# Patient Record
Sex: MALE | Race: WHITE | URBAN - METROPOLITAN AREA
[De-identification: names, ages, dates, MRNs, and addresses within clinical notes are randomized per-mention and may not be internally consistent; named-entity substitution may affect disease eponyms.]

---

## 2018-01-01 ENCOUNTER — APPOINTMENT (OUTPATIENT)
Dept: OCCUPATIONAL THERAPY | Facility: CLINIC | Age: 63
DRG: 041 | End: 2018-01-01
Attending: PSYCHIATRY & NEUROLOGY
Payer: MEDICARE

## 2018-01-01 ENCOUNTER — HOSPITAL ENCOUNTER (OUTPATIENT)
Dept: MRI IMAGING | Facility: CLINIC | Age: 63
Discharge: HOME | End: 2018-11-23
Attending: PSYCHIATRY & NEUROLOGY | Admitting: PSYCHIATRY & NEUROLOGY
Payer: MEDICARE

## 2018-01-01 ENCOUNTER — APPOINTMENT (OUTPATIENT)
Dept: GENERAL RADIOLOGY | Facility: CLINIC | Age: 63
DRG: 041 | End: 2018-01-01
Attending: STUDENT IN AN ORGANIZED HEALTH CARE EDUCATION/TRAINING PROGRAM
Payer: MEDICARE

## 2018-01-01 ENCOUNTER — PRE VISIT (OUTPATIENT)
Dept: NEUROLOGY | Facility: CLINIC | Age: 63
End: 2018-01-01

## 2018-01-01 ENCOUNTER — APPOINTMENT (OUTPATIENT)
Dept: PHYSICAL THERAPY | Facility: CLINIC | Age: 63
DRG: 041 | End: 2018-01-01
Attending: PSYCHIATRY & NEUROLOGY
Payer: MEDICARE

## 2018-01-01 ENCOUNTER — OFFICE VISIT (OUTPATIENT)
Dept: NEUROLOGY | Facility: CLINIC | Age: 63
End: 2018-01-01
Payer: MEDICARE

## 2018-01-01 ENCOUNTER — DOCUMENTATION ONLY (OUTPATIENT)
Dept: NEUROLOGY | Facility: CLINIC | Age: 63
End: 2018-01-01

## 2018-01-01 ENCOUNTER — HOSPITAL ENCOUNTER (OUTPATIENT)
Dept: MRI IMAGING | Facility: CLINIC | Age: 63
End: 2018-11-16
Attending: PSYCHIATRY & NEUROLOGY
Payer: MEDICARE

## 2018-01-01 ENCOUNTER — TELEPHONE (OUTPATIENT)
Dept: NEUROLOGY | Facility: CLINIC | Age: 63
End: 2018-01-01

## 2018-01-01 ENCOUNTER — APPOINTMENT (OUTPATIENT)
Dept: LAB | Facility: CLINIC | Age: 63
End: 2018-01-01
Payer: MEDICARE

## 2018-01-01 ENCOUNTER — ANESTHESIA EVENT (OUTPATIENT)
Dept: SURGERY | Facility: CLINIC | Age: 63
DRG: 041 | End: 2018-01-01
Payer: MEDICARE

## 2018-01-01 ENCOUNTER — HOSPITAL ENCOUNTER (OUTPATIENT)
Dept: MRI IMAGING | Facility: CLINIC | Age: 63
Discharge: HOME | End: 2018-11-16
Attending: PSYCHIATRY & NEUROLOGY | Admitting: PSYCHIATRY & NEUROLOGY
Payer: MEDICARE

## 2018-01-01 ENCOUNTER — ANESTHESIA (OUTPATIENT)
Dept: SURGERY | Facility: CLINIC | Age: 63
DRG: 041 | End: 2018-01-01
Payer: MEDICARE

## 2018-01-01 ENCOUNTER — RADIANT APPOINTMENT (OUTPATIENT)
Dept: GENERAL RADIOLOGY | Facility: CLINIC | Age: 63
End: 2018-01-01
Attending: PSYCHIATRY & NEUROLOGY
Payer: MEDICARE

## 2018-01-01 ENCOUNTER — HOSPITAL ENCOUNTER (OUTPATIENT)
Dept: MRI IMAGING | Facility: CLINIC | Age: 63
End: 2018-11-23
Attending: PSYCHIATRY & NEUROLOGY
Payer: MEDICARE

## 2018-01-01 ENCOUNTER — APPOINTMENT (OUTPATIENT)
Dept: OCCUPATIONAL THERAPY | Facility: CLINIC | Age: 63
DRG: 041 | End: 2018-01-01
Attending: STUDENT IN AN ORGANIZED HEALTH CARE EDUCATION/TRAINING PROGRAM
Payer: MEDICARE

## 2018-01-01 ENCOUNTER — OFFICE VISIT (OUTPATIENT)
Dept: NEUROLOGY | Facility: CLINIC | Age: 63
End: 2018-01-01

## 2018-01-01 ENCOUNTER — HOSPITAL ENCOUNTER (INPATIENT)
Facility: CLINIC | Age: 63
LOS: 8 days | Discharge: NURSING, REHAB OR LONG-TERM CARE | DRG: 041 | End: 2018-12-14
Attending: PSYCHIATRY & NEUROLOGY | Admitting: PSYCHIATRY & NEUROLOGY
Payer: MEDICARE

## 2018-01-01 ENCOUNTER — HOSPITAL ENCOUNTER (INPATIENT)
Facility: CLINIC | Age: 63
LOS: 12 days | Discharge: NURSING, REHAB OR LONG-TERM CARE | DRG: 056 | End: 2018-12-26
Attending: PHYSICAL MEDICINE & REHABILITATION | Admitting: PHYSICAL MEDICINE & REHABILITATION
Payer: MEDICARE

## 2018-01-01 ENCOUNTER — TELEPHONE (OUTPATIENT)
Dept: ONCOLOGY | Facility: CLINIC | Age: 63
End: 2018-01-01

## 2018-01-01 ENCOUNTER — APPOINTMENT (OUTPATIENT)
Dept: PHYSICAL THERAPY | Facility: CLINIC | Age: 63
DRG: 041 | End: 2018-01-01
Attending: STUDENT IN AN ORGANIZED HEALTH CARE EDUCATION/TRAINING PROGRAM
Payer: MEDICARE

## 2018-01-01 VITALS
RESPIRATION RATE: 16 BRPM | SYSTOLIC BLOOD PRESSURE: 149 MMHG | HEART RATE: 54 BPM | OXYGEN SATURATION: 95 % | DIASTOLIC BLOOD PRESSURE: 99 MMHG | WEIGHT: 315 LBS | BODY MASS INDEX: 45.8 KG/M2 | TEMPERATURE: 96.5 F

## 2018-01-01 VITALS
BODY MASS INDEX: 45.1 KG/M2 | DIASTOLIC BLOOD PRESSURE: 96 MMHG | TEMPERATURE: 97.7 F | SYSTOLIC BLOOD PRESSURE: 150 MMHG | HEART RATE: 64 BPM | OXYGEN SATURATION: 94 % | RESPIRATION RATE: 18 BRPM | HEIGHT: 70 IN | WEIGHT: 315 LBS

## 2018-01-01 VITALS
BODY MASS INDEX: 43.95 KG/M2 | TEMPERATURE: 97.8 F | HEART RATE: 54 BPM | RESPIRATION RATE: 18 BRPM | SYSTOLIC BLOOD PRESSURE: 107 MMHG | WEIGHT: 307 LBS | OXYGEN SATURATION: 93 % | DIASTOLIC BLOOD PRESSURE: 73 MMHG | HEIGHT: 70 IN

## 2018-01-01 VITALS
BODY MASS INDEX: 44.34 KG/M2 | WEIGHT: 309.7 LBS | TEMPERATURE: 96.2 F | HEART RATE: 62 BPM | RESPIRATION RATE: 16 BRPM | SYSTOLIC BLOOD PRESSURE: 138 MMHG | DIASTOLIC BLOOD PRESSURE: 93 MMHG | OXYGEN SATURATION: 96 % | HEIGHT: 70 IN

## 2018-01-01 VITALS
WEIGHT: 315 LBS | DIASTOLIC BLOOD PRESSURE: 99 MMHG | HEART RATE: 66 BPM | SYSTOLIC BLOOD PRESSURE: 156 MMHG | OXYGEN SATURATION: 95 % | BODY MASS INDEX: 46.06 KG/M2

## 2018-01-01 VITALS
RESPIRATION RATE: 18 BRPM | HEART RATE: 64 BPM | OXYGEN SATURATION: 97 % | DIASTOLIC BLOOD PRESSURE: 82 MMHG | TEMPERATURE: 98.1 F | SYSTOLIC BLOOD PRESSURE: 151 MMHG

## 2018-01-01 DIAGNOSIS — R29.898 RIGHT LEG WEAKNESS: Primary | ICD-10-CM

## 2018-01-01 DIAGNOSIS — I10 ESSENTIAL HYPERTENSION: ICD-10-CM

## 2018-01-01 DIAGNOSIS — G61.81 CIDP (CHRONIC INFLAMMATORY DEMYELINATING POLYNEUROPATHY) (H): Primary | ICD-10-CM

## 2018-01-01 DIAGNOSIS — G62.9 NEUROPATHY: ICD-10-CM

## 2018-01-01 DIAGNOSIS — G61.81 CIDP (CHRONIC INFLAMMATORY DEMYELINATING POLYNEUROPATHY) (H): ICD-10-CM

## 2018-01-01 DIAGNOSIS — G56.01 CARPAL TUNNEL SYNDROME OF RIGHT WRIST: ICD-10-CM

## 2018-01-01 DIAGNOSIS — D47.2 MONOCLONAL GAMMOPATHY: Primary | ICD-10-CM

## 2018-01-01 DIAGNOSIS — M62.81 GENERALIZED MUSCLE WEAKNESS: ICD-10-CM

## 2018-01-01 DIAGNOSIS — R06.09 DOE (DYSPNEA ON EXERTION): Primary | ICD-10-CM

## 2018-01-01 DIAGNOSIS — R53.1 RAPIDLY PROGRESSIVE WEAKNESS: Primary | ICD-10-CM

## 2018-01-01 DIAGNOSIS — M62.81 GENERALIZED MUSCLE WEAKNESS: Primary | ICD-10-CM

## 2018-01-01 DIAGNOSIS — G12.20 MND (MOTOR NEURONE DISEASE) (H): Primary | ICD-10-CM

## 2018-01-01 DIAGNOSIS — G62.9 NEUROPATHY: Primary | ICD-10-CM

## 2018-01-01 DIAGNOSIS — H25.13 NUCLEAR SENILE CATARACT OF BOTH EYES: ICD-10-CM

## 2018-01-01 LAB
ALB CSF/SERPL: 15.7 RATIO (ref 0–9)
ALB CSF/SERPL: 32 RATIO (ref 0–9)
ALBUMIN CSF-MCNC: 63 MG/DL (ref 0–35)
ALBUMIN CSF-MCNC: 99 MG/DL (ref 0–35)
ALBUMIN SERPL ELPH-MCNC: 4.4 G/DL (ref 3.7–5.1)
ALBUMIN SERPL-MCNC: 3090 MG/DL (ref 3500–5200)
ALBUMIN SERPL-MCNC: 4020 MG/DL (ref 3500–5200)
ALPHA1 GLOB SERPL ELPH-MCNC: 0.3 G/DL (ref 0.2–0.4)
ALPHA2 GLOB SERPL ELPH-MCNC: 0.8 G/DL (ref 0.5–0.9)
ANA PAT SER IF-IMP: ABNORMAL
ANA SER QL IF: ABNORMAL
ANA TITR SER IF: ABNORMAL {TITER}
ANION GAP SERPL CALCULATED.3IONS-SCNC: 13 MMOL/L (ref 3–14)
ANION GAP SERPL CALCULATED.3IONS-SCNC: 4 MMOL/L (ref 3–14)
ANION GAP SERPL CALCULATED.3IONS-SCNC: 6 MMOL/L (ref 3–14)
ANION GAP SERPL CALCULATED.3IONS-SCNC: 6 MMOL/L (ref 3–14)
ANION GAP SERPL CALCULATED.3IONS-SCNC: 7 MMOL/L (ref 3–14)
ANION GAP SERPL CALCULATED.3IONS-SCNC: 7 MMOL/L (ref 3–14)
ANION GAP SERPL CALCULATED.3IONS-SCNC: 8 MMOL/L (ref 3–14)
ANION GAP SERPL CALCULATED.3IONS-SCNC: 9 MMOL/L (ref 3–14)
APPEARANCE CSF: ABNORMAL
APTT PPP: 33 SEC (ref 22–37)
B BURGDOR AB CSF IA-ACNC: 0.25 LIV
B BURGDOR AB CSF IA-ACNC: NORMAL
B BURGDOR IGG+IGM SER QL: 0.11 (ref 0–0.89)
B-GLOBULIN SERPL ELPH-MCNC: 1 G/DL (ref 0.6–1)
BACTERIA SPEC CULT: NO GROWTH
BASOPHILS # BLD AUTO: 0 10E9/L (ref 0–0.2)
BASOPHILS NFR BLD AUTO: 0.3 %
BUN SERPL-MCNC: 10 MG/DL (ref 7–30)
BUN SERPL-MCNC: 11 MG/DL (ref 7–30)
BUN SERPL-MCNC: 12 MG/DL (ref 7–30)
BUN SERPL-MCNC: 13 MG/DL (ref 7–30)
BUN SERPL-MCNC: 13 MG/DL (ref 7–30)
BUN SERPL-MCNC: 14 MG/DL (ref 7–30)
BUN SERPL-MCNC: 16 MG/DL (ref 7–30)
BUN SERPL-MCNC: 16 MG/DL (ref 7–30)
CALCIUM SERPL-MCNC: 8.8 MG/DL (ref 8.5–10.1)
CALCIUM SERPL-MCNC: 9.1 MG/DL (ref 8.5–10.1)
CALCIUM SERPL-MCNC: 9.2 MG/DL (ref 8.5–10.1)
CALCIUM SERPL-MCNC: 9.2 MG/DL (ref 8.5–10.1)
CALCIUM SERPL-MCNC: 9.5 MG/DL (ref 8.5–10.1)
CHLORIDE SERPL-SCNC: 102 MMOL/L (ref 94–109)
CHLORIDE SERPL-SCNC: 103 MMOL/L (ref 94–109)
CK SERPL-CCNC: 630 U/L (ref 30–300)
CO2 SERPL-SCNC: 23 MMOL/L (ref 20–32)
CO2 SERPL-SCNC: 27 MMOL/L (ref 20–32)
CO2 SERPL-SCNC: 28 MMOL/L (ref 20–32)
CO2 SERPL-SCNC: 29 MMOL/L (ref 20–32)
COLOR CSF: ABNORMAL
COPATH REPORT: NORMAL
CREAT SERPL-MCNC: 0.52 MG/DL (ref 0.66–1.25)
CREAT SERPL-MCNC: 0.55 MG/DL (ref 0.66–1.25)
CREAT SERPL-MCNC: 0.6 MG/DL (ref 0.66–1.25)
CREAT SERPL-MCNC: 0.61 MG/DL (ref 0.66–1.25)
CREAT SERPL-MCNC: 0.62 MG/DL (ref 0.66–1.25)
CREAT SERPL-MCNC: 0.63 MG/DL (ref 0.66–1.25)
CREAT SERPL-MCNC: 0.64 MG/DL (ref 0.66–1.25)
CREAT SERPL-MCNC: 0.65 MG/DL (ref 0.66–1.25)
CREAT SERPL-MCNC: 0.66 MG/DL (ref 0.66–1.25)
CRP SERPL-MCNC: 4.7 MG/L (ref 0–8)
DIFFERENTIAL METHOD BLD: NORMAL
DLCOUNC-%PRED-PRE: 112 %
DLCOUNC-PRE: 30.43 ML/MIN/MMHG
DLCOUNC-PRED: 27.08 ML/MIN/MMHG
ENA SS-A IGG SER IA-ACNC: <0.2 AI (ref 0–0.9)
ENA SS-B IGG SER IA-ACNC: <0.2 AI (ref 0–0.9)
EOSINOPHIL # BLD AUTO: 0.1 10E9/L (ref 0–0.7)
EOSINOPHIL NFR BLD AUTO: 0.7 %
EOSINOPHIL NFR CSF MANUAL: 1 %
ERV-%PRED-PRE: 266 %
ERV-PRE: 0.99 L
ERV-PRED: 0.37 L
ERYTHROCYTE [DISTWIDTH] IN BLOOD BY AUTOMATED COUNT: 13.3 % (ref 10–15)
ERYTHROCYTE [DISTWIDTH] IN BLOOD BY AUTOMATED COUNT: 13.3 % (ref 10–15)
ERYTHROCYTE [DISTWIDTH] IN BLOOD BY AUTOMATED COUNT: 13.4 % (ref 10–15)
ERYTHROCYTE [DISTWIDTH] IN BLOOD BY AUTOMATED COUNT: 13.5 % (ref 10–15)
ERYTHROCYTE [DISTWIDTH] IN BLOOD BY AUTOMATED COUNT: 13.6 % (ref 10–15)
ERYTHROCYTE [DISTWIDTH] IN BLOOD BY AUTOMATED COUNT: 13.7 % (ref 10–15)
ERYTHROCYTE [SEDIMENTATION RATE] IN BLOOD BY WESTERGREN METHOD: 17 MM/H (ref 0–20)
ERYTHROCYTE [SEDIMENTATION RATE] IN BLOOD BY WESTERGREN METHOD: 9 MM/H (ref 0–20)
EXPTIME-PRE: 7.48 SEC
FEF2575-%PRED-PRE: 67 %
FEF2575-PRE: 1.8 L/SEC
FEF2575-PRED: 2.66 L/SEC
FEFMAX-%PRED-PRE: 58 %
FEFMAX-PRE: 5.1 L/SEC
FEFMAX-PRED: 8.75 L/SEC
FEV1-%PRED-PRE: 66 %
FEV1-PRE: 2.25 L
FEV1FEV6-PRE: 77 %
FEV1FEV6-PRED: 78 %
FEV1FVC-PRE: 76 %
FEV1FVC-PRED: 74 %
FEV1SVC-PRE: 78 %
FEV1SVC-PRED: 69 %
FIFMAX-PRE: 4.27 L/SEC
FRCPLETH-%PRED-PRE: 85 %
FRCPLETH-PRE: 3.11 L
FRCPLETH-PRED: 3.66 L
FVC-%PRED-PRE: 67 %
FVC-PRE: 2.97 L
FVC-PRED: 4.41 L
GAMMA GLOB SERPL ELPH-MCNC: 1.5 G/DL (ref 0.7–1.6)
GFR SERPL CREATININE-BSD FRML MDRD: >90 ML/MIN/1.7M2
GLUCOSE CSF-MCNC: 65 MG/DL (ref 40–70)
GLUCOSE CSF-MCNC: 74 MG/DL (ref 40–70)
GLUCOSE SERPL-MCNC: 108 MG/DL (ref 70–99)
GLUCOSE SERPL-MCNC: 109 MG/DL (ref 70–99)
GLUCOSE SERPL-MCNC: 114 MG/DL (ref 70–99)
GLUCOSE SERPL-MCNC: 115 MG/DL (ref 70–99)
GLUCOSE SERPL-MCNC: 125 MG/DL (ref 70–99)
GLUCOSE SERPL-MCNC: 133 MG/DL (ref 70–99)
GLUCOSE SERPL-MCNC: 134 MG/DL (ref 70–99)
GLUCOSE SERPL-MCNC: 162 MG/DL (ref 70–99)
GM1 GANGL IGG SER IA-ACNC: 8 IV (ref 0–50)
GM1 GANGL IGM SER IA-ACNC: 1 IV (ref 0–50)
GRAM STN SPEC: NORMAL
HBA1C MFR BLD: 6.7 % (ref 0–5.6)
HCT VFR BLD AUTO: 44.3 % (ref 40–53)
HCT VFR BLD AUTO: 44.3 % (ref 40–53)
HCT VFR BLD AUTO: 44.7 % (ref 40–53)
HCT VFR BLD AUTO: 45.2 % (ref 40–53)
HCT VFR BLD AUTO: 46 % (ref 40–53)
HCT VFR BLD AUTO: 46.4 % (ref 40–53)
HCT VFR BLD AUTO: 46.5 % (ref 40–53)
HCT VFR BLD AUTO: 46.9 % (ref 40–53)
HCT VFR BLD AUTO: 47.7 % (ref 40–53)
HGB BLD-MCNC: 14.6 G/DL (ref 13.3–17.7)
HGB BLD-MCNC: 14.6 G/DL (ref 13.3–17.7)
HGB BLD-MCNC: 14.8 G/DL (ref 13.3–17.7)
HGB BLD-MCNC: 15.1 G/DL (ref 13.3–17.7)
HGB BLD-MCNC: 15.4 G/DL (ref 13.3–17.7)
HGB BLD-MCNC: 15.4 G/DL (ref 13.3–17.7)
HGB BLD-MCNC: 15.6 G/DL (ref 13.3–17.7)
HGB BLD-MCNC: 15.7 G/DL (ref 13.3–17.7)
HGB BLD-MCNC: 15.8 G/DL (ref 13.3–17.7)
IC-%PRED-PRE: 42 %
IC-PRE: 1.89 L
IC-PRED: 4.49 L
IGA SERPL-MCNC: 396 MG/DL (ref 70–380)
IGA SERPL-MCNC: 449 MG/DL (ref 70–380)
IGG CSF-MCNC: 16.6 MG/DL (ref 0–6)
IGG CSF-MCNC: 17.1 MG/DL (ref 0–6)
IGG SERPL-MCNC: 1120 MG/DL (ref 768–1632)
IGG SERPL-MCNC: 1360 MG/DL (ref 768–1632)
IGG SERPL-MCNC: 1530 MG/DL (ref 695–1620)
IGG SERPL-MCNC: 2170 MG/DL (ref 695–1620)
IGG SYNTH RATE SER+CSF CALC-MRATE: 3.9 MG/D
IGG SYNTH RATE SER+CSF CALC-MRATE: 31.5 MG/D
IGG/ALB CLEAR SER+CSF-RTO: 0.48 RATIO (ref 0.28–0.66)
IGG/ALB CLEAR SER+CSF-RTO: 0.78 RATIO (ref 0.28–0.66)
IGG/ALB CSF: 0.17 RATIO (ref 0.09–0.25)
IGG/ALB CSF: 0.26 RATIO (ref 0.09–0.25)
IGM SERPL-MCNC: 88 MG/DL (ref 60–265)
IGM SERPL-MCNC: 95 MG/DL (ref 60–265)
IMM GRANULOCYTES # BLD: 0 10E9/L (ref 0–0.4)
IMM GRANULOCYTES NFR BLD: 0.3 %
INR PPP: 1.07 (ref 0.86–1.14)
LAB SCANNED RESULT: NORMAL
LEAD BLDV-MCNC: <2 UG/DL (ref 0–4.9)
LYMPH ABN NFR CSF MANUAL: 10 %
LYMPH ABN NFR CSF MANUAL: 32 %
LYMPH ABN NFR CSF MANUAL: 47 %
LYMPHOCYTES # BLD AUTO: 1.5 10E9/L (ref 0.8–5.3)
LYMPHOCYTES NFR BLD AUTO: 21.4 %
M PROTEIN SERPL ELPH-MCNC: 0 G/DL
MCH RBC QN AUTO: 28.8 PG (ref 26.5–33)
MCH RBC QN AUTO: 28.8 PG (ref 26.5–33)
MCH RBC QN AUTO: 28.9 PG (ref 26.5–33)
MCH RBC QN AUTO: 28.9 PG (ref 26.5–33)
MCH RBC QN AUTO: 29 PG (ref 26.5–33)
MCH RBC QN AUTO: 29.1 PG (ref 26.5–33)
MCH RBC QN AUTO: 29.1 PG (ref 26.5–33)
MCH RBC QN AUTO: 29.2 PG (ref 26.5–33)
MCH RBC QN AUTO: 29.2 PG (ref 26.5–33)
MCHC RBC AUTO-ENTMCNC: 33 G/DL (ref 31.5–36.5)
MCHC RBC AUTO-ENTMCNC: 33 G/DL (ref 31.5–36.5)
MCHC RBC AUTO-ENTMCNC: 33.1 G/DL (ref 31.5–36.5)
MCHC RBC AUTO-ENTMCNC: 33.4 G/DL (ref 31.5–36.5)
MCHC RBC AUTO-ENTMCNC: 33.5 G/DL (ref 31.5–36.5)
MCHC RBC AUTO-ENTMCNC: 33.5 G/DL (ref 31.5–36.5)
MCHC RBC AUTO-ENTMCNC: 33.6 G/DL (ref 31.5–36.5)
MCV RBC AUTO: 86 FL (ref 78–100)
MCV RBC AUTO: 87 FL (ref 78–100)
MCV RBC AUTO: 88 FL (ref 78–100)
METHYLMALONATE SERPL-SCNC: 0.16 UMOL/L (ref 0–0.4)
MISCELLANEOUS TEST: NORMAL
MONOCYTES # BLD AUTO: 0.6 10E9/L (ref 0–1.3)
MONOCYTES NFR BLD AUTO: 8.9 %
MONOS+MACROS NFR CSF MANUAL: 3 %
MONOS+MACROS NFR CSF MANUAL: 47 %
MONOS+MACROS NFR CSF MANUAL: 5 %
NEUTROPHILS # BLD AUTO: 4.9 10E9/L (ref 1.6–8.3)
NEUTROPHILS NFR BLD AUTO: 68.4 %
NEUTROPHILS NFR CSF MANUAL: 43 %
NEUTROPHILS NFR CSF MANUAL: 48 %
NEUTROPHILS NFR CSF MANUAL: 64 %
NRBC # BLD AUTO: 0 10*3/UL
NRBC BLD AUTO-RTO: 0 /100
OLIGOCLONAL BANDS CSF ELPH-IMP: ABNORMAL
OLIGOCLONAL BANDS CSF ELPH-IMP: ABNORMAL
OLIGOCLONAL BANDS CSF ELPH-IMP: NEGATIVE
OLIGOCLONAL BANDS CSF ELPH-IMP: NEGATIVE
OLIGOCLONAL BANDS CSF IEF: 0 BANDS (ref 0–1)
OLIGOCLONAL BANDS CSF IEF: 0 BANDS (ref 0–1)
PLATELET # BLD AUTO: 184 10E9/L (ref 150–450)
PLATELET # BLD AUTO: 196 10E9/L (ref 150–450)
PLATELET # BLD AUTO: 201 10E9/L (ref 150–450)
PLATELET # BLD AUTO: 202 10E9/L (ref 150–450)
PLATELET # BLD AUTO: 210 10E9/L (ref 150–450)
PLATELET # BLD AUTO: 235 10E9/L (ref 150–450)
PLATELET # BLD AUTO: 248 10E9/L (ref 150–450)
PLATELET # BLD AUTO: 263 10E9/L (ref 150–450)
PLATELET # BLD AUTO: 264 10E9/L (ref 150–450)
PLATELET # BLD AUTO: 268 10E9/L (ref 150–450)
POTASSIUM SERPL-SCNC: 3.5 MMOL/L (ref 3.4–5.3)
POTASSIUM SERPL-SCNC: 3.6 MMOL/L (ref 3.4–5.3)
POTASSIUM SERPL-SCNC: 3.6 MMOL/L (ref 3.4–5.3)
POTASSIUM SERPL-SCNC: 3.7 MMOL/L (ref 3.4–5.3)
POTASSIUM SERPL-SCNC: 3.7 MMOL/L (ref 3.4–5.3)
POTASSIUM SERPL-SCNC: 3.8 MMOL/L (ref 3.4–5.3)
POTASSIUM SERPL-SCNC: 3.8 MMOL/L (ref 3.4–5.3)
POTASSIUM SERPL-SCNC: 3.9 MMOL/L (ref 3.4–5.3)
PROT CSF-MCNC: 136 MG/DL (ref 15–60)
PROT CSF-MCNC: 186 MG/DL (ref 15–60)
PROT PATTERN SERPL ELPH-IMP: NORMAL
PROT PATTERN SERPL IFE-IMP: ABNORMAL
PROT PATTERN SERPL IFE-IMP: ABNORMAL
RADIOLOGIST FLAGS: NORMAL
RBC # BLD AUTO: 5.06 10E12/L (ref 4.4–5.9)
RBC # BLD AUTO: 5.07 10E12/L (ref 4.4–5.9)
RBC # BLD AUTO: 5.09 10E12/L (ref 4.4–5.9)
RBC # BLD AUTO: 5.21 10E12/L (ref 4.4–5.9)
RBC # BLD AUTO: 5.27 10E12/L (ref 4.4–5.9)
RBC # BLD AUTO: 5.33 10E12/L (ref 4.4–5.9)
RBC # BLD AUTO: 5.34 10E12/L (ref 4.4–5.9)
RBC # BLD AUTO: 5.43 10E12/L (ref 4.4–5.9)
RBC # BLD AUTO: 5.46 10E12/L (ref 4.4–5.9)
RBC # CSF MANUAL: 4038 /UL (ref 0–2)
RBC # CSF MANUAL: 768 /UL (ref 0–2)
RBC # CSF MANUAL: 8525 /UL (ref 0–2)
RVPLETH-%PRED-PRE: 83 %
RVPLETH-PRE: 2.13 L
RVPLETH-PRED: 2.55 L
SODIUM SERPL-SCNC: 136 MMOL/L (ref 133–144)
SODIUM SERPL-SCNC: 138 MMOL/L (ref 133–144)
SODIUM SERPL-SCNC: 138 MMOL/L (ref 133–144)
SODIUM SERPL-SCNC: 139 MMOL/L (ref 133–144)
SPECIMEN SOURCE: NORMAL
SPECIMEN SOURCE: NORMAL
T4 FREE SERPL-MCNC: 1.09 NG/DL (ref 0.76–1.46)
TLCPLETH-%PRED-PRE: 70 %
TLCPLETH-PRE: 5.01 L
TLCPLETH-PRED: 7.13 L
TSH SERPL DL<=0.005 MIU/L-ACNC: 7.42 MU/L (ref 0.4–4)
TUBE # CSF: 1 #
TUBE # CSF: 3 #
TUBE # CSF: 4 #
VA-%PRED-PRE: 72 %
VA-PRE: 4.92 L
VC-%PRED-PRE: 59 %
VC-PRE: 2.88 L
VC-PRED: 4.86 L
VDRL CSF QL: NON REACTIVE
VDRL CSF QL: NORMAL
VIT B12 SERPL-MCNC: 462 PG/ML (ref 193–986)
WBC # BLD AUTO: 5.5 10E9/L (ref 4–11)
WBC # BLD AUTO: 5.5 10E9/L (ref 4–11)
WBC # BLD AUTO: 6.4 10E9/L (ref 4–11)
WBC # BLD AUTO: 6.5 10E9/L (ref 4–11)
WBC # BLD AUTO: 7 10E9/L (ref 4–11)
WBC # BLD AUTO: 7.1 10E9/L (ref 4–11)
WBC # BLD AUTO: 7.2 10E9/L (ref 4–11)
WBC # BLD AUTO: 7.3 10E9/L (ref 4–11)
WBC # BLD AUTO: 7.4 10E9/L (ref 4–11)
WBC # CSF MANUAL: 14 /UL (ref 0–5)
WBC # CSF MANUAL: 7 /UL (ref 0–5)
WBC # CSF MANUAL: 8 /UL (ref 0–5)

## 2018-01-01 PROCEDURE — 40000133 ZZH STATISTIC OT WARD VISIT: Performed by: STUDENT IN AN ORGANIZED HEALTH CARE EDUCATION/TRAINING PROGRAM

## 2018-01-01 PROCEDURE — 25000132 ZZH RX MED GY IP 250 OP 250 PS 637: Mod: GY | Performed by: STUDENT IN AN ORGANIZED HEALTH CARE EDUCATION/TRAINING PROGRAM

## 2018-01-01 PROCEDURE — 97530 THERAPEUTIC ACTIVITIES: CPT | Mod: GP | Performed by: PHYSICAL THERAPIST

## 2018-01-01 PROCEDURE — 97535 SELF CARE MNGMENT TRAINING: CPT | Mod: GO | Performed by: STUDENT IN AN ORGANIZED HEALTH CARE EDUCATION/TRAINING PROGRAM

## 2018-01-01 PROCEDURE — 82040 ASSAY OF SERUM ALBUMIN: CPT | Performed by: STUDENT IN AN ORGANIZED HEALTH CARE EDUCATION/TRAINING PROGRAM

## 2018-01-01 PROCEDURE — 009U3ZX DRAINAGE OF SPINAL CANAL, PERCUTANEOUS APPROACH, DIAGNOSTIC: ICD-10-PCS | Performed by: RADIOLOGY

## 2018-01-01 PROCEDURE — 25000132 ZZH RX MED GY IP 250 OP 250 PS 637: Mod: GY | Performed by: PHYSICAL MEDICINE & REHABILITATION

## 2018-01-01 PROCEDURE — 97535 SELF CARE MNGMENT TRAINING: CPT | Mod: GO

## 2018-01-01 PROCEDURE — 85730 THROMBOPLASTIN TIME PARTIAL: CPT | Performed by: STUDENT IN AN ORGANIZED HEALTH CARE EDUCATION/TRAINING PROGRAM

## 2018-01-01 PROCEDURE — 12000001 ZZH R&B MED SURG/OB UMMC

## 2018-01-01 PROCEDURE — 85027 COMPLETE CBC AUTOMATED: CPT | Performed by: PSYCHIATRY & NEUROLOGY

## 2018-01-01 PROCEDURE — A9585 GADOBUTROL INJECTION: HCPCS | Performed by: PSYCHIATRY & NEUROLOGY

## 2018-01-01 PROCEDURE — 97110 THERAPEUTIC EXERCISES: CPT | Mod: GP | Performed by: REHABILITATION PRACTITIONER

## 2018-01-01 PROCEDURE — 97116 GAIT TRAINING THERAPY: CPT | Mod: GP

## 2018-01-01 PROCEDURE — 86235 NUCLEAR ANTIGEN ANTIBODY: CPT | Performed by: PSYCHIATRY & NEUROLOGY

## 2018-01-01 PROCEDURE — 97530 THERAPEUTIC ACTIVITIES: CPT | Mod: GP

## 2018-01-01 PROCEDURE — 40000193 ZZH STATISTIC PT WARD VISIT

## 2018-01-01 PROCEDURE — A9270 NON-COVERED ITEM OR SERVICE: HCPCS | Mod: GY | Performed by: STUDENT IN AN ORGANIZED HEALTH CARE EDUCATION/TRAINING PROGRAM

## 2018-01-01 PROCEDURE — 97535 SELF CARE MNGMENT TRAINING: CPT | Mod: GO | Performed by: OCCUPATIONAL THERAPIST

## 2018-01-01 PROCEDURE — 25000125 ZZHC RX 250: Performed by: NURSE ANESTHETIST, CERTIFIED REGISTERED

## 2018-01-01 PROCEDURE — 97530 THERAPEUTIC ACTIVITIES: CPT | Mod: GO | Performed by: OCCUPATIONAL THERAPIST

## 2018-01-01 PROCEDURE — 97110 THERAPEUTIC EXERCISES: CPT | Mod: GO | Performed by: STUDENT IN AN ORGANIZED HEALTH CARE EDUCATION/TRAINING PROGRAM

## 2018-01-01 PROCEDURE — 89051 BODY FLUID CELL COUNT: CPT | Performed by: STUDENT IN AN ORGANIZED HEALTH CARE EDUCATION/TRAINING PROGRAM

## 2018-01-01 PROCEDURE — 40000193 ZZH STATISTIC PT WARD VISIT: Performed by: PHYSICAL THERAPIST

## 2018-01-01 PROCEDURE — 25000128 H RX IP 250 OP 636: Performed by: SURGERY

## 2018-01-01 PROCEDURE — 25000125 ZZHC RX 250: Performed by: SURGERY

## 2018-01-01 PROCEDURE — 86038 ANTINUCLEAR ANTIBODIES: CPT | Performed by: PSYCHIATRY & NEUROLOGY

## 2018-01-01 PROCEDURE — 12800006 ZZH R&B REHAB

## 2018-01-01 PROCEDURE — 82784 ASSAY IGA/IGD/IGG/IGM EACH: CPT | Performed by: STUDENT IN AN ORGANIZED HEALTH CARE EDUCATION/TRAINING PROGRAM

## 2018-01-01 PROCEDURE — 40000133 ZZH STATISTIC OT WARD VISIT

## 2018-01-01 PROCEDURE — 12000008 ZZH R&B INTERMEDIATE UMMC

## 2018-01-01 PROCEDURE — 36415 COLL VENOUS BLD VENIPUNCTURE: CPT | Performed by: PSYCHIATRY & NEUROLOGY

## 2018-01-01 PROCEDURE — A9270 NON-COVERED ITEM OR SERVICE: HCPCS | Mod: GY | Performed by: PHYSICAL MEDICINE & REHABILITATION

## 2018-01-01 PROCEDURE — 86334 IMMUNOFIX E-PHORESIS SERUM: CPT | Performed by: PSYCHIATRY & NEUROLOGY

## 2018-01-01 PROCEDURE — 97032 APPL MODALITY 1+ESTIM EA 15: CPT | Mod: GP

## 2018-01-01 PROCEDURE — 97530 THERAPEUTIC ACTIVITIES: CPT | Mod: GO

## 2018-01-01 PROCEDURE — 36415 COLL VENOUS BLD VENIPUNCTURE: CPT | Performed by: INTERNAL MEDICINE

## 2018-01-01 PROCEDURE — 97116 GAIT TRAINING THERAPY: CPT | Mod: GP | Performed by: REHABILITATION PRACTITIONER

## 2018-01-01 PROCEDURE — 82784 ASSAY IGA/IGD/IGG/IGM EACH: CPT | Performed by: PSYCHIATRY & NEUROLOGY

## 2018-01-01 PROCEDURE — 25000128 H RX IP 250 OP 636: Performed by: NURSE ANESTHETIST, CERTIFIED REGISTERED

## 2018-01-01 PROCEDURE — 72157 MRI CHEST SPINE W/O & W/DYE: CPT

## 2018-01-01 PROCEDURE — 85049 AUTOMATED PLATELET COUNT: CPT | Performed by: PSYCHIATRY & NEUROLOGY

## 2018-01-01 PROCEDURE — 40000193 ZZH STATISTIC PT WARD VISIT: Performed by: REHABILITATION PRACTITIONER

## 2018-01-01 PROCEDURE — 82945 GLUCOSE OTHER FLUID: CPT | Performed by: PSYCHIATRY & NEUROLOGY

## 2018-01-01 PROCEDURE — 97110 THERAPEUTIC EXERCISES: CPT | Mod: GP

## 2018-01-01 PROCEDURE — 40000133 ZZH STATISTIC OT WARD VISIT: Performed by: OCCUPATIONAL THERAPIST

## 2018-01-01 PROCEDURE — 00000102 ZZHCL STATISTIC CYTO WRIGHT STAIN TC: Performed by: PSYCHIATRY & NEUROLOGY

## 2018-01-01 PROCEDURE — 97116 GAIT TRAINING THERAPY: CPT | Mod: GP | Performed by: PHYSICAL THERAPIST

## 2018-01-01 PROCEDURE — 84165 PROTEIN E-PHORESIS SERUM: CPT | Performed by: PSYCHIATRY & NEUROLOGY

## 2018-01-01 PROCEDURE — 30233S1 TRANSFUSION OF NONAUTOLOGOUS GLOBULIN INTO PERIPHERAL VEIN, PERCUTANEOUS APPROACH: ICD-10-PCS | Performed by: PSYCHIATRY & NEUROLOGY

## 2018-01-01 PROCEDURE — 85025 COMPLETE CBC W/AUTO DIFF WBC: CPT | Performed by: STUDENT IN AN ORGANIZED HEALTH CARE EDUCATION/TRAINING PROGRAM

## 2018-01-01 PROCEDURE — 80048 BASIC METABOLIC PNL TOTAL CA: CPT | Performed by: PSYCHIATRY & NEUROLOGY

## 2018-01-01 PROCEDURE — 88108 CYTOPATH CONCENTRATE TECH: CPT | Performed by: PSYCHIATRY & NEUROLOGY

## 2018-01-01 PROCEDURE — 36415 COLL VENOUS BLD VENIPUNCTURE: CPT | Performed by: STUDENT IN AN ORGANIZED HEALTH CARE EDUCATION/TRAINING PROGRAM

## 2018-01-01 PROCEDURE — 40000802 ZZH SITE CHECK

## 2018-01-01 PROCEDURE — 84439 ASSAY OF FREE THYROXINE: CPT | Performed by: STUDENT IN AN ORGANIZED HEALTH CARE EDUCATION/TRAINING PROGRAM

## 2018-01-01 PROCEDURE — 71000014 ZZH RECOVERY PHASE 1 LEVEL 2 FIRST HR: Performed by: SURGERY

## 2018-01-01 PROCEDURE — 97163 PT EVAL HIGH COMPLEX 45 MIN: CPT | Mod: GP | Performed by: PHYSICAL THERAPIST

## 2018-01-01 PROCEDURE — 70553 MRI BRAIN STEM W/O & W/DYE: CPT

## 2018-01-01 PROCEDURE — 97530 THERAPEUTIC ACTIVITIES: CPT | Mod: GO | Performed by: STUDENT IN AN ORGANIZED HEALTH CARE EDUCATION/TRAINING PROGRAM

## 2018-01-01 PROCEDURE — 25000565 ZZH ISOFLURANE, EA 15 MIN: Performed by: SURGERY

## 2018-01-01 PROCEDURE — 25000128 H RX IP 250 OP 636: Performed by: STUDENT IN AN ORGANIZED HEALTH CARE EDUCATION/TRAINING PROGRAM

## 2018-01-01 PROCEDURE — 97110 THERAPEUTIC EXERCISES: CPT | Mod: GP | Performed by: PHYSICAL THERAPIST

## 2018-01-01 PROCEDURE — 25000125 ZZHC RX 250: Performed by: STUDENT IN AN ORGANIZED HEALTH CARE EDUCATION/TRAINING PROGRAM

## 2018-01-01 PROCEDURE — 84999 UNLISTED CHEMISTRY PROCEDURE: CPT | Performed by: STUDENT IN AN ORGANIZED HEALTH CARE EDUCATION/TRAINING PROGRAM

## 2018-01-01 PROCEDURE — 97167 OT EVAL HIGH COMPLEX 60 MIN: CPT | Mod: GO

## 2018-01-01 PROCEDURE — 84157 ASSAY OF PROTEIN OTHER: CPT | Performed by: PSYCHIATRY & NEUROLOGY

## 2018-01-01 PROCEDURE — 83655 ASSAY OF LEAD: CPT | Performed by: STUDENT IN AN ORGANIZED HEALTH CARE EDUCATION/TRAINING PROGRAM

## 2018-01-01 PROCEDURE — 40000559 ZZH STATISTIC FAILED PERIPHERAL IV START

## 2018-01-01 PROCEDURE — 40000183 ZZH STATISTIC PT MED CONFERENCE < 30 MIN: Performed by: STUDENT IN AN ORGANIZED HEALTH CARE EDUCATION/TRAINING PROGRAM

## 2018-01-01 PROCEDURE — 82945 GLUCOSE OTHER FLUID: CPT | Performed by: STUDENT IN AN ORGANIZED HEALTH CARE EDUCATION/TRAINING PROGRAM

## 2018-01-01 PROCEDURE — 37000009 ZZH ANESTHESIA TECHNICAL FEE, EACH ADDTL 15 MIN: Performed by: SURGERY

## 2018-01-01 PROCEDURE — 83520 IMMUNOASSAY QUANT NOS NONAB: CPT | Performed by: STUDENT IN AN ORGANIZED HEALTH CARE EDUCATION/TRAINING PROGRAM

## 2018-01-01 PROCEDURE — 84999 UNLISTED CHEMISTRY PROCEDURE: CPT | Performed by: PSYCHIATRY & NEUROLOGY

## 2018-01-01 PROCEDURE — 88348 ELECTRON MICROSCOPY DX: CPT | Performed by: PSYCHIATRY & NEUROLOGY

## 2018-01-01 PROCEDURE — 72156 MRI NECK SPINE W/O & W/DYE: CPT

## 2018-01-01 PROCEDURE — 86592 SYPHILIS TEST NON-TREP QUAL: CPT | Performed by: STUDENT IN AN ORGANIZED HEALTH CARE EDUCATION/TRAINING PROGRAM

## 2018-01-01 PROCEDURE — 25500064 ZZH RX 255 OP 636: Performed by: PSYCHIATRY & NEUROLOGY

## 2018-01-01 PROCEDURE — 82550 ASSAY OF CK (CPK): CPT | Performed by: STUDENT IN AN ORGANIZED HEALTH CARE EDUCATION/TRAINING PROGRAM

## 2018-01-01 PROCEDURE — 86039 ANTINUCLEAR ANTIBODIES (ANA): CPT | Performed by: PSYCHIATRY & NEUROLOGY

## 2018-01-01 PROCEDURE — 25000125 ZZHC RX 250: Performed by: RADIOLOGY

## 2018-01-01 PROCEDURE — 85049 AUTOMATED PLATELET COUNT: CPT | Performed by: STUDENT IN AN ORGANIZED HEALTH CARE EDUCATION/TRAINING PROGRAM

## 2018-01-01 PROCEDURE — 40000141 ZZH STATISTIC PERIPHERAL IV START W/O US GUIDANCE

## 2018-01-01 PROCEDURE — 83916 OLIGOCLONAL BANDS: CPT | Performed by: STUDENT IN AN ORGANIZED HEALTH CARE EDUCATION/TRAINING PROGRAM

## 2018-01-01 PROCEDURE — 97530 THERAPEUTIC ACTIVITIES: CPT | Mod: GP | Performed by: REHABILITATION PRACTITIONER

## 2018-01-01 PROCEDURE — 87070 CULTURE OTHR SPECIMN AEROBIC: CPT | Performed by: STUDENT IN AN ORGANIZED HEALTH CARE EDUCATION/TRAINING PROGRAM

## 2018-01-01 PROCEDURE — 86618 LYME DISEASE ANTIBODY: CPT | Performed by: STUDENT IN AN ORGANIZED HEALTH CARE EDUCATION/TRAINING PROGRAM

## 2018-01-01 PROCEDURE — 37000008 ZZH ANESTHESIA TECHNICAL FEE, 1ST 30 MIN: Performed by: SURGERY

## 2018-01-01 PROCEDURE — 36000059 ZZH SURGERY LEVEL 3 EA 15 ADDTL MIN UMMC: Performed by: SURGERY

## 2018-01-01 PROCEDURE — 97110 THERAPEUTIC EXERCISES: CPT | Mod: GO

## 2018-01-01 PROCEDURE — 97530 THERAPEUTIC ACTIVITIES: CPT | Mod: GP | Performed by: STUDENT IN AN ORGANIZED HEALTH CARE EDUCATION/TRAINING PROGRAM

## 2018-01-01 PROCEDURE — 36000057 ZZH SURGERY LEVEL 3 1ST 30 MIN - UMMC: Performed by: SURGERY

## 2018-01-01 PROCEDURE — 85610 PROTHROMBIN TIME: CPT | Performed by: STUDENT IN AN ORGANIZED HEALTH CARE EDUCATION/TRAINING PROGRAM

## 2018-01-01 PROCEDURE — 85652 RBC SED RATE AUTOMATED: CPT | Performed by: STUDENT IN AN ORGANIZED HEALTH CARE EDUCATION/TRAINING PROGRAM

## 2018-01-01 PROCEDURE — 86140 C-REACTIVE PROTEIN: CPT | Performed by: STUDENT IN AN ORGANIZED HEALTH CARE EDUCATION/TRAINING PROGRAM

## 2018-01-01 PROCEDURE — 97162 PT EVAL MOD COMPLEX 30 MIN: CPT | Mod: GP

## 2018-01-01 PROCEDURE — 40000187 ZZH STATISTIC PATIENT MED CONFERENCE < 30 MIN: Performed by: STUDENT IN AN ORGANIZED HEALTH CARE EDUCATION/TRAINING PROGRAM

## 2018-01-01 PROCEDURE — 62270 DX LMBR SPI PNXR: CPT

## 2018-01-01 PROCEDURE — 01BG0ZX EXCISION OF TIBIAL NERVE, OPEN APPROACH, DIAGNOSTIC: ICD-10-PCS | Performed by: SURGERY

## 2018-01-01 PROCEDURE — 27210794 ZZH OR GENERAL SUPPLY STERILE: Performed by: SURGERY

## 2018-01-01 PROCEDURE — 97110 THERAPEUTIC EXERCISES: CPT | Mod: GP | Performed by: STUDENT IN AN ORGANIZED HEALTH CARE EDUCATION/TRAINING PROGRAM

## 2018-01-01 PROCEDURE — 82042 OTHER SOURCE ALBUMIN QUAN EA: CPT | Performed by: STUDENT IN AN ORGANIZED HEALTH CARE EDUCATION/TRAINING PROGRAM

## 2018-01-01 PROCEDURE — 97116 GAIT TRAINING THERAPY: CPT | Mod: GP | Performed by: STUDENT IN AN ORGANIZED HEALTH CARE EDUCATION/TRAINING PROGRAM

## 2018-01-01 PROCEDURE — 97110 THERAPEUTIC EXERCISES: CPT | Mod: GO | Performed by: OCCUPATIONAL THERAPIST

## 2018-01-01 PROCEDURE — 82565 ASSAY OF CREATININE: CPT | Performed by: STUDENT IN AN ORGANIZED HEALTH CARE EDUCATION/TRAINING PROGRAM

## 2018-01-01 PROCEDURE — 40000193 ZZH STATISTIC PT WARD VISIT: Performed by: STUDENT IN AN ORGANIZED HEALTH CARE EDUCATION/TRAINING PROGRAM

## 2018-01-01 PROCEDURE — 72158 MRI LUMBAR SPINE W/O & W/DYE: CPT

## 2018-01-01 PROCEDURE — 83516 IMMUNOASSAY NONANTIBODY: CPT | Performed by: PSYCHIATRY & NEUROLOGY

## 2018-01-01 PROCEDURE — 84443 ASSAY THYROID STIM HORMONE: CPT | Performed by: STUDENT IN AN ORGANIZED HEALTH CARE EDUCATION/TRAINING PROGRAM

## 2018-01-01 PROCEDURE — 87015 SPECIMEN INFECT AGNT CONCNTJ: CPT | Performed by: STUDENT IN AN ORGANIZED HEALTH CARE EDUCATION/TRAINING PROGRAM

## 2018-01-01 PROCEDURE — 83036 HEMOGLOBIN GLYCOSYLATED A1C: CPT | Performed by: PSYCHIATRY & NEUROLOGY

## 2018-01-01 PROCEDURE — 97165 OT EVAL LOW COMPLEX 30 MIN: CPT | Mod: GO | Performed by: OCCUPATIONAL THERAPIST

## 2018-01-01 PROCEDURE — 87205 SMEAR GRAM STAIN: CPT | Performed by: STUDENT IN AN ORGANIZED HEALTH CARE EDUCATION/TRAINING PROGRAM

## 2018-01-01 PROCEDURE — 25000125 ZZHC RX 250

## 2018-01-01 PROCEDURE — 84157 ASSAY OF PROTEIN OTHER: CPT | Performed by: STUDENT IN AN ORGANIZED HEALTH CARE EDUCATION/TRAINING PROGRAM

## 2018-01-01 PROCEDURE — 00000402 ZZHCL STATISTIC TOTAL PROTEIN: Performed by: PSYCHIATRY & NEUROLOGY

## 2018-01-01 PROCEDURE — 40000170 ZZH STATISTIC PRE-PROCEDURE ASSESSMENT II: Performed by: SURGERY

## 2018-01-01 PROCEDURE — 83516 IMMUNOASSAY NONANTIBODY: CPT | Performed by: STUDENT IN AN ORGANIZED HEALTH CARE EDUCATION/TRAINING PROGRAM

## 2018-01-01 RX ORDER — DORZOLAMIDE HYDROCHLORIDE AND TIMOLOL MALEATE 20; 5 MG/ML; MG/ML
1 SOLUTION/ DROPS OPHTHALMIC 2 TIMES DAILY
Status: DISCONTINUED | OUTPATIENT
Start: 2018-01-01 | End: 2018-01-01 | Stop reason: HOSPADM

## 2018-01-01 RX ORDER — DIPHENHYDRAMINE HYDROCHLORIDE 50 MG/ML
50 INJECTION INTRAMUSCULAR; INTRAVENOUS
Status: DISCONTINUED | OUTPATIENT
Start: 2018-01-01 | End: 2018-01-01

## 2018-01-01 RX ORDER — AMOXICILLIN AND CLAVULANATE POTASSIUM 500; 125 MG/1; MG/1
1 TABLET, FILM COATED ORAL EVERY 8 HOURS SCHEDULED
Status: COMPLETED | OUTPATIENT
Start: 2018-01-01 | End: 2018-01-01

## 2018-01-01 RX ORDER — DIPHENHYDRAMINE HCL 25 MG
50 CAPSULE ORAL ONCE
Status: CANCELLED | OUTPATIENT
Start: 2018-01-01

## 2018-01-01 RX ORDER — LIDOCAINE HYDROCHLORIDE 10 MG/ML
30 INJECTION, SOLUTION EPIDURAL; INFILTRATION; INTRACAUDAL; PERINEURAL ONCE
Status: COMPLETED | OUTPATIENT
Start: 2018-01-01 | End: 2018-01-01

## 2018-01-01 RX ORDER — HYDROMORPHONE HYDROCHLORIDE 1 MG/ML
.3-.5 INJECTION, SOLUTION INTRAMUSCULAR; INTRAVENOUS; SUBCUTANEOUS EVERY 5 MIN PRN
Status: DISCONTINUED | OUTPATIENT
Start: 2018-01-01 | End: 2018-01-01 | Stop reason: HOSPADM

## 2018-01-01 RX ORDER — ONDANSETRON 4 MG/1
4 TABLET, ORALLY DISINTEGRATING ORAL EVERY 30 MIN PRN
Status: DISCONTINUED | OUTPATIENT
Start: 2018-01-01 | End: 2018-01-01 | Stop reason: HOSPADM

## 2018-01-01 RX ORDER — POTASSIUM CHLORIDE 7.45 MG/ML
10 INJECTION INTRAVENOUS
Status: DISCONTINUED | OUTPATIENT
Start: 2018-01-01 | End: 2018-01-01 | Stop reason: HOSPADM

## 2018-01-01 RX ORDER — ACETAMINOPHEN 325 MG/1
650 TABLET ORAL EVERY 4 HOURS PRN
DISCHARGE
Start: 2018-01-01 | End: 2019-01-01

## 2018-01-01 RX ORDER — EPHEDRINE SULFATE 50 MG/ML
INJECTION, SOLUTION INTRAMUSCULAR; INTRAVENOUS; SUBCUTANEOUS PRN
Status: DISCONTINUED | OUTPATIENT
Start: 2018-01-01 | End: 2018-01-01

## 2018-01-01 RX ORDER — LEVOTHYROXINE SODIUM 125 UG/1
TABLET ORAL DAILY
COMMUNITY
Start: 2018-01-01

## 2018-01-01 RX ORDER — AMOXICILLIN 250 MG
1 CAPSULE ORAL 2 TIMES DAILY PRN
Qty: 100 TABLET | DISCHARGE
Start: 2018-01-01 | End: 2019-01-01

## 2018-01-01 RX ORDER — BUPIVACAINE HYDROCHLORIDE 2.5 MG/ML
INJECTION, SOLUTION INFILTRATION; PERINEURAL PRN
Status: DISCONTINUED | OUTPATIENT
Start: 2018-01-01 | End: 2018-01-01 | Stop reason: HOSPADM

## 2018-01-01 RX ORDER — POLYETHYLENE GLYCOL 3350 17 G/17G
17 POWDER, FOR SOLUTION ORAL DAILY
Status: DISCONTINUED | OUTPATIENT
Start: 2018-01-01 | End: 2018-01-01

## 2018-01-01 RX ORDER — FENTANYL CITRATE 50 UG/ML
INJECTION, SOLUTION INTRAMUSCULAR; INTRAVENOUS PRN
Status: DISCONTINUED | OUTPATIENT
Start: 2018-01-01 | End: 2018-01-01

## 2018-01-01 RX ORDER — ONDANSETRON 2 MG/ML
INJECTION INTRAMUSCULAR; INTRAVENOUS PRN
Status: DISCONTINUED | OUTPATIENT
Start: 2018-01-01 | End: 2018-01-01

## 2018-01-01 RX ORDER — ONDANSETRON 4 MG/1
4 TABLET, ORALLY DISINTEGRATING ORAL EVERY 6 HOURS PRN
Status: DISCONTINUED | OUTPATIENT
Start: 2018-01-01 | End: 2018-01-01 | Stop reason: HOSPADM

## 2018-01-01 RX ORDER — LIDOCAINE HYDROCHLORIDE 10 MG/ML
INJECTION, SOLUTION EPIDURAL; INFILTRATION; INTRACAUDAL; PERINEURAL
Status: COMPLETED
Start: 2018-01-01 | End: 2018-01-01

## 2018-01-01 RX ORDER — HYDROCHLOROTHIAZIDE 25 MG/1
25 TABLET ORAL DAILY
Status: ON HOLD | COMMUNITY
Start: 2018-01-01 | End: 2018-01-01

## 2018-01-01 RX ORDER — GADOBUTROL 604.72 MG/ML
10 INJECTION INTRAVENOUS ONCE
Status: COMPLETED | OUTPATIENT
Start: 2018-01-01 | End: 2018-01-01

## 2018-01-01 RX ORDER — BRIMONIDINE TARTRATE 2 MG/ML
1 SOLUTION/ DROPS OPHTHALMIC 2 TIMES DAILY
Status: DISCONTINUED | OUTPATIENT
Start: 2018-01-01 | End: 2018-01-01 | Stop reason: HOSPADM

## 2018-01-01 RX ORDER — DIPHENHYDRAMINE HYDROCHLORIDE 50 MG/ML
50 INJECTION INTRAMUSCULAR; INTRAVENOUS EVERY 24 HOURS
Status: COMPLETED | OUTPATIENT
Start: 2018-01-01 | End: 2018-01-01

## 2018-01-01 RX ORDER — HYDROCHLOROTHIAZIDE 12.5 MG/1
25 TABLET ORAL DAILY
Status: DISCONTINUED | OUTPATIENT
Start: 2018-01-01 | End: 2018-01-01

## 2018-01-01 RX ORDER — FENTANYL CITRATE 50 UG/ML
25-50 INJECTION, SOLUTION INTRAMUSCULAR; INTRAVENOUS
Status: DISCONTINUED | OUTPATIENT
Start: 2018-01-01 | End: 2018-01-01 | Stop reason: HOSPADM

## 2018-01-01 RX ORDER — POTASSIUM CHLORIDE 1.5 G/1.58G
20-40 POWDER, FOR SOLUTION ORAL
Status: DISCONTINUED | OUTPATIENT
Start: 2018-01-01 | End: 2018-01-01 | Stop reason: HOSPADM

## 2018-01-01 RX ORDER — SODIUM CHLORIDE, SODIUM LACTATE, POTASSIUM CHLORIDE, CALCIUM CHLORIDE 600; 310; 30; 20 MG/100ML; MG/100ML; MG/100ML; MG/100ML
INJECTION, SOLUTION INTRAVENOUS CONTINUOUS PRN
Status: DISCONTINUED | OUTPATIENT
Start: 2018-01-01 | End: 2018-01-01

## 2018-01-01 RX ORDER — BRIMONIDINE TARTRATE 2 MG/ML
1 SOLUTION/ DROPS OPHTHALMIC EVERY 8 HOURS SCHEDULED
Status: DISCONTINUED | OUTPATIENT
Start: 2018-01-01 | End: 2018-01-01

## 2018-01-01 RX ORDER — NALOXONE HYDROCHLORIDE 0.4 MG/ML
.1-.4 INJECTION, SOLUTION INTRAMUSCULAR; INTRAVENOUS; SUBCUTANEOUS
Status: DISCONTINUED | OUTPATIENT
Start: 2018-01-01 | End: 2018-01-01

## 2018-01-01 RX ORDER — LIDOCAINE HYDROCHLORIDE AND EPINEPHRINE 10; 10 MG/ML; UG/ML
INJECTION, SOLUTION INFILTRATION; PERINEURAL PRN
Status: DISCONTINUED | OUTPATIENT
Start: 2018-01-01 | End: 2018-01-01 | Stop reason: HOSPADM

## 2018-01-01 RX ORDER — ONDANSETRON 2 MG/ML
4 INJECTION INTRAMUSCULAR; INTRAVENOUS EVERY 30 MIN PRN
Status: DISCONTINUED | OUTPATIENT
Start: 2018-01-01 | End: 2018-01-01 | Stop reason: HOSPADM

## 2018-01-01 RX ORDER — ACETAMINOPHEN 325 MG/1
325-975 TABLET ORAL EVERY 6 HOURS PRN
Status: DISCONTINUED | OUTPATIENT
Start: 2018-01-01 | End: 2018-01-01 | Stop reason: HOSPADM

## 2018-01-01 RX ORDER — AMOXICILLIN 250 MG
1 CAPSULE ORAL 2 TIMES DAILY PRN
Status: DISCONTINUED | OUTPATIENT
Start: 2018-01-01 | End: 2018-01-01 | Stop reason: HOSPADM

## 2018-01-01 RX ORDER — LISINOPRIL 20 MG/1
40 TABLET ORAL DAILY
Status: DISCONTINUED | OUTPATIENT
Start: 2018-01-01 | End: 2018-01-01

## 2018-01-01 RX ORDER — LATANOPROST 50 UG/ML
1 SOLUTION/ DROPS OPHTHALMIC AT BEDTIME
DISCHARGE
Start: 2018-01-01 | End: 2019-01-01

## 2018-01-01 RX ORDER — DIPHENHYDRAMINE HCL 25 MG
50 CAPSULE ORAL EVERY 24 HOURS
Status: COMPLETED | OUTPATIENT
Start: 2018-01-01 | End: 2018-01-01

## 2018-01-01 RX ORDER — SODIUM CHLORIDE, SODIUM LACTATE, POTASSIUM CHLORIDE, CALCIUM CHLORIDE 600; 310; 30; 20 MG/100ML; MG/100ML; MG/100ML; MG/100ML
INJECTION, SOLUTION INTRAVENOUS CONTINUOUS
Status: DISCONTINUED | OUTPATIENT
Start: 2018-01-01 | End: 2018-01-01 | Stop reason: HOSPADM

## 2018-01-01 RX ORDER — ACETAMINOPHEN 325 MG/1
650 TABLET ORAL EVERY 4 HOURS PRN
Status: DISCONTINUED | OUTPATIENT
Start: 2018-01-01 | End: 2018-01-01 | Stop reason: HOSPADM

## 2018-01-01 RX ORDER — LATANOPROST 50 UG/ML
1 SOLUTION/ DROPS OPHTHALMIC AT BEDTIME
Status: DISCONTINUED | OUTPATIENT
Start: 2018-01-01 | End: 2018-01-01 | Stop reason: HOSPADM

## 2018-01-01 RX ORDER — PROCHLORPERAZINE 25 MG
12.5 SUPPOSITORY, RECTAL RECTAL EVERY 12 HOURS PRN
Status: DISCONTINUED | OUTPATIENT
Start: 2018-01-01 | End: 2018-01-01 | Stop reason: HOSPADM

## 2018-01-01 RX ORDER — LISINOPRIL 20 MG/1
20 TABLET ORAL 2 TIMES DAILY
Status: DISCONTINUED | OUTPATIENT
Start: 2018-01-01 | End: 2018-01-01 | Stop reason: HOSPADM

## 2018-01-01 RX ORDER — LATANOPROST 50 UG/ML
1 SOLUTION/ DROPS OPHTHALMIC
Status: ON HOLD | COMMUNITY
Start: 2018-03-01 | End: 2018-01-01

## 2018-01-01 RX ORDER — BRIMONIDINE TARTRATE 1 MG/ML
1 SOLUTION/ DROPS OPHTHALMIC 2 TIMES DAILY
DISCHARGE
Start: 2018-01-01 | End: 2019-01-01

## 2018-01-01 RX ORDER — DORZOLAMIDE HYDROCHLORIDE AND TIMOLOL MALEATE 20; 5 MG/ML; MG/ML
SOLUTION/ DROPS OPHTHALMIC
Refills: 4 | COMMUNITY
Start: 2018-01-01

## 2018-01-01 RX ORDER — POTASSIUM CL/LIDO/0.9 % NACL 10MEQ/0.1L
10 INTRAVENOUS SOLUTION, PIGGYBACK (ML) INTRAVENOUS
Status: DISCONTINUED | OUTPATIENT
Start: 2018-01-01 | End: 2018-01-01 | Stop reason: HOSPADM

## 2018-01-01 RX ORDER — ACETAMINOPHEN 325 MG/1
650 TABLET ORAL EVERY 24 HOURS
Status: COMPLETED | OUTPATIENT
Start: 2018-01-01 | End: 2018-01-01

## 2018-01-01 RX ORDER — CEFAZOLIN SODIUM 1 G/3ML
INJECTION, POWDER, FOR SOLUTION INTRAMUSCULAR; INTRAVENOUS PRN
Status: DISCONTINUED | OUTPATIENT
Start: 2018-01-01 | End: 2018-01-01

## 2018-01-01 RX ORDER — HYDROCHLOROTHIAZIDE 25 MG/1
25 TABLET ORAL 2 TIMES DAILY
Qty: 30 TABLET | Refills: 0 | DISCHARGE
Start: 2018-01-01 | End: 2019-01-01

## 2018-01-01 RX ORDER — NALOXONE HYDROCHLORIDE 0.4 MG/ML
.1-.4 INJECTION, SOLUTION INTRAMUSCULAR; INTRAVENOUS; SUBCUTANEOUS
Status: DISCONTINUED | OUTPATIENT
Start: 2018-01-01 | End: 2018-01-01 | Stop reason: HOSPADM

## 2018-01-01 RX ORDER — ONDANSETRON 2 MG/ML
4 INJECTION INTRAMUSCULAR; INTRAVENOUS EVERY 6 HOURS PRN
Status: DISCONTINUED | OUTPATIENT
Start: 2018-01-01 | End: 2018-01-01 | Stop reason: HOSPADM

## 2018-01-01 RX ORDER — POTASSIUM CHLORIDE 750 MG/1
20-40 TABLET, EXTENDED RELEASE ORAL
Status: DISCONTINUED | OUTPATIENT
Start: 2018-01-01 | End: 2018-01-01 | Stop reason: HOSPADM

## 2018-01-01 RX ORDER — NICOTINE POLACRILEX 4 MG
15-30 LOZENGE BUCCAL
Status: DISCONTINUED | OUTPATIENT
Start: 2018-01-01 | End: 2018-01-01 | Stop reason: HOSPADM

## 2018-01-01 RX ORDER — DIPHENHYDRAMINE HCL 25 MG
50 CAPSULE ORAL
Status: DISCONTINUED | OUTPATIENT
Start: 2018-01-01 | End: 2018-01-01

## 2018-01-01 RX ORDER — HYDROCHLOROTHIAZIDE 12.5 MG/1
25 TABLET ORAL
Status: DISCONTINUED | OUTPATIENT
Start: 2018-01-01 | End: 2018-01-01 | Stop reason: HOSPADM

## 2018-01-01 RX ORDER — DEXTROSE MONOHYDRATE 25 G/50ML
25-50 INJECTION, SOLUTION INTRAVENOUS
Status: DISCONTINUED | OUTPATIENT
Start: 2018-01-01 | End: 2018-01-01 | Stop reason: HOSPADM

## 2018-01-01 RX ORDER — PROPOFOL 10 MG/ML
INJECTION, EMULSION INTRAVENOUS PRN
Status: DISCONTINUED | OUTPATIENT
Start: 2018-01-01 | End: 2018-01-01

## 2018-01-01 RX ORDER — PROCHLORPERAZINE MALEATE 5 MG
5 TABLET ORAL EVERY 6 HOURS PRN
Status: DISCONTINUED | OUTPATIENT
Start: 2018-01-01 | End: 2018-01-01 | Stop reason: HOSPADM

## 2018-01-01 RX ORDER — DEXTROSE MONOHYDRATE 25 G/50ML
25-50 INJECTION, SOLUTION INTRAVENOUS
Status: CANCELLED | OUTPATIENT
Start: 2018-01-01

## 2018-01-01 RX ORDER — LIDOCAINE HYDROCHLORIDE 20 MG/ML
INJECTION, SOLUTION INFILTRATION; PERINEURAL PRN
Status: DISCONTINUED | OUTPATIENT
Start: 2018-01-01 | End: 2018-01-01

## 2018-01-01 RX ORDER — NICOTINE POLACRILEX 4 MG
15-30 LOZENGE BUCCAL
Status: CANCELLED | OUTPATIENT
Start: 2018-01-01

## 2018-01-01 RX ORDER — POLYETHYLENE GLYCOL 3350 17 G/17G
17 POWDER, FOR SOLUTION ORAL DAILY PRN
Status: DISCONTINUED | OUTPATIENT
Start: 2018-01-01 | End: 2018-01-01 | Stop reason: HOSPADM

## 2018-01-01 RX ORDER — BISACODYL 10 MG
10 SUPPOSITORY, RECTAL RECTAL DAILY PRN
Status: DISCONTINUED | OUTPATIENT
Start: 2018-01-01 | End: 2018-01-01 | Stop reason: HOSPADM

## 2018-01-01 RX ORDER — CEFAZOLIN SODIUM IN 0.9 % NACL 3 G/100 ML
3 INTRAVENOUS SOLUTION, PIGGYBACK (ML) INTRAVENOUS
Status: CANCELLED | OUTPATIENT
Start: 2018-01-01

## 2018-01-01 RX ORDER — POTASSIUM CHLORIDE 29.8 MG/ML
20 INJECTION INTRAVENOUS
Status: DISCONTINUED | OUTPATIENT
Start: 2018-01-01 | End: 2018-01-01 | Stop reason: HOSPADM

## 2018-01-01 RX ORDER — LISINOPRIL 40 MG/1
40 TABLET ORAL DAILY
Status: DISCONTINUED | OUTPATIENT
Start: 2018-01-01 | End: 2018-01-01 | Stop reason: HOSPADM

## 2018-01-01 RX ORDER — HYDROCHLOROTHIAZIDE 25 MG/1
25 TABLET ORAL DAILY
Status: DISCONTINUED | OUTPATIENT
Start: 2018-01-01 | End: 2018-01-01 | Stop reason: HOSPADM

## 2018-01-01 RX ORDER — CEFAZOLIN SODIUM 1 G/3ML
1 INJECTION, POWDER, FOR SOLUTION INTRAMUSCULAR; INTRAVENOUS SEE ADMIN INSTRUCTIONS
Status: CANCELLED | OUTPATIENT
Start: 2018-01-01

## 2018-01-01 RX ORDER — BRIMONIDINE TARTRATE 1 MG/ML
SOLUTION/ DROPS OPHTHALMIC
Status: ON HOLD | COMMUNITY
Start: 2018-01-01 | End: 2018-01-01

## 2018-01-01 RX ORDER — LISINOPRIL 40 MG/1
40 TABLET ORAL DAILY
Status: ON HOLD | COMMUNITY
Start: 2018-01-01 | End: 2018-01-01

## 2018-01-01 RX ORDER — BRIMONIDINE TARTRATE 2 MG/ML
1 SOLUTION/ DROPS OPHTHALMIC EVERY 8 HOURS SCHEDULED
Status: DISCONTINUED | OUTPATIENT
Start: 2018-01-01 | End: 2018-01-01 | Stop reason: HOSPADM

## 2018-01-01 RX ORDER — BRIMONIDINE TARTRATE 2 MG/ML
1 SOLUTION/ DROPS OPHTHALMIC 2 TIMES DAILY
Status: DISCONTINUED | OUTPATIENT
Start: 2018-01-01 | End: 2018-01-01

## 2018-01-01 RX ORDER — ACETAMINOPHEN 325 MG/1
650 TABLET ORAL
Status: DISCONTINUED | OUTPATIENT
Start: 2018-01-01 | End: 2018-01-01

## 2018-01-01 RX ORDER — LISINOPRIL 20 MG/1
20 TABLET ORAL 2 TIMES DAILY
Qty: 30 TABLET | Refills: 0 | DISCHARGE
Start: 2018-01-01 | End: 2019-01-01

## 2018-01-01 RX ORDER — ACETAMINOPHEN 325 MG/1
650 TABLET ORAL ONCE
Status: CANCELLED
Start: 2018-01-01

## 2018-01-01 RX ORDER — METHYLPREDNISOLONE SODIUM SUCCINATE 125 MG/2ML
125 INJECTION, POWDER, LYOPHILIZED, FOR SOLUTION INTRAMUSCULAR; INTRAVENOUS
Status: DISCONTINUED | OUTPATIENT
Start: 2018-01-01 | End: 2018-01-01

## 2018-01-01 RX ORDER — MAGNESIUM SULFATE HEPTAHYDRATE 40 MG/ML
4 INJECTION, SOLUTION INTRAVENOUS EVERY 4 HOURS PRN
Status: DISCONTINUED | OUTPATIENT
Start: 2018-01-01 | End: 2018-01-01 | Stop reason: HOSPADM

## 2018-01-01 RX ADMIN — GADOBUTROL 10 ML: 604.72 INJECTION INTRAVENOUS at 17:43

## 2018-01-01 RX ADMIN — LISINOPRIL 40 MG: 20 TABLET ORAL at 08:48

## 2018-01-01 RX ADMIN — DORZOLAMIDE HYDROCHLORIDE AND TIMOLOL MALEATE 1 DROP: 20; 5 SOLUTION/ DROPS OPHTHALMIC at 08:13

## 2018-01-01 RX ADMIN — BRIMONIDINE TARTRATE 1 DROP: 2 SOLUTION OPHTHALMIC at 23:55

## 2018-01-01 RX ADMIN — LATANOPROST 1 DROP: 50 SOLUTION/ DROPS OPHTHALMIC at 21:00

## 2018-01-01 RX ADMIN — DORZOLAMIDE HYDROCHLORIDE AND TIMOLOL MALEATE 1 DROP: 20; 5 SOLUTION/ DROPS OPHTHALMIC at 06:19

## 2018-01-01 RX ADMIN — HUMAN IMMUNOGLOBULIN G 40 G: 40 LIQUID INTRAVENOUS at 19:56

## 2018-01-01 RX ADMIN — BRIMONIDINE TARTRATE 1 DROP: 2 SOLUTION OPHTHALMIC at 22:18

## 2018-01-01 RX ADMIN — DORZOLAMIDE HYDROCHLORIDE AND TIMOLOL MALEATE 1 DROP: 20; 5 SOLUTION/ DROPS OPHTHALMIC at 19:19

## 2018-01-01 RX ADMIN — BRIMONIDINE TARTRATE 1 DROP: 2 SOLUTION/ DROPS OPHTHALMIC at 08:05

## 2018-01-01 RX ADMIN — HYDROCHLOROTHIAZIDE 25 MG: 25 TABLET ORAL at 07:52

## 2018-01-01 RX ADMIN — LEVOTHYROXINE SODIUM 125 MCG: 25 TABLET ORAL at 07:46

## 2018-01-01 RX ADMIN — ENOXAPARIN SODIUM 40 MG: 40 INJECTION SUBCUTANEOUS at 18:46

## 2018-01-01 RX ADMIN — HYDROCHLOROTHIAZIDE 25 MG: 12.5 TABLET ORAL at 07:26

## 2018-01-01 RX ADMIN — DORZOLAMIDE HYDROCHLORIDE AND TIMOLOL MALEATE 1 DROP: 20; 5 SOLUTION/ DROPS OPHTHALMIC at 07:50

## 2018-01-01 RX ADMIN — HYDROCHLOROTHIAZIDE 25 MG: 12.5 TABLET ORAL at 21:18

## 2018-01-01 RX ADMIN — BRIMONIDINE TARTRATE 1 DROP: 2 SOLUTION/ DROPS OPHTHALMIC at 06:19

## 2018-01-01 RX ADMIN — LEVOTHYROXINE SODIUM 125 MCG: 25 TABLET ORAL at 07:47

## 2018-01-01 RX ADMIN — DORZOLAMIDE HYDROCHLORIDE AND TIMOLOL MALEATE 1 DROP: 20; 5 SOLUTION/ DROPS OPHTHALMIC at 19:10

## 2018-01-01 RX ADMIN — AMOXICILLIN AND CLAVULANATE POTASSIUM 1 TABLET: 500; 125 TABLET, FILM COATED ORAL at 06:53

## 2018-01-01 RX ADMIN — SENNOSIDES AND DOCUSATE SODIUM 1 TABLET: 8.6; 5 TABLET ORAL at 09:17

## 2018-01-01 RX ADMIN — AMOXICILLIN AND CLAVULANATE POTASSIUM 1 TABLET: 500; 125 TABLET, FILM COATED ORAL at 15:22

## 2018-01-01 RX ADMIN — LISINOPRIL 20 MG: 20 TABLET ORAL at 21:18

## 2018-01-01 RX ADMIN — HYDROCHLOROTHIAZIDE 25 MG: 12.5 TABLET ORAL at 20:34

## 2018-01-01 RX ADMIN — AMOXICILLIN AND CLAVULANATE POTASSIUM 1 TABLET: 500; 125 TABLET, FILM COATED ORAL at 21:01

## 2018-01-01 RX ADMIN — DORZOLAMIDE HYDROCHLORIDE AND TIMOLOL MALEATE 1 DROP: 20; 5 SOLUTION/ DROPS OPHTHALMIC at 20:06

## 2018-01-01 RX ADMIN — SENNOSIDES AND DOCUSATE SODIUM 1 TABLET: 8.6; 5 TABLET ORAL at 14:24

## 2018-01-01 RX ADMIN — BRIMONIDINE TARTRATE 1 DROP: 2 SOLUTION OPHTHALMIC at 08:43

## 2018-01-01 RX ADMIN — LEVOTHYROXINE SODIUM 125 MCG: 25 TABLET ORAL at 07:18

## 2018-01-01 RX ADMIN — MIDAZOLAM 1 MG: 1 INJECTION INTRAMUSCULAR; INTRAVENOUS at 10:28

## 2018-01-01 RX ADMIN — LEVOTHYROXINE SODIUM 125 MCG: 25 TABLET ORAL at 06:16

## 2018-01-01 RX ADMIN — DORZOLAMIDE HYDROCHLORIDE AND TIMOLOL MALEATE 1 DROP: 20; 5 SOLUTION/ DROPS OPHTHALMIC at 07:26

## 2018-01-01 RX ADMIN — DORZOLAMIDE HYDROCHLORIDE AND TIMOLOL MALEATE 1 DROP: 20; 5 SOLUTION/ DROPS OPHTHALMIC at 07:47

## 2018-01-01 RX ADMIN — BRIMONIDINE TARTRATE 1 DROP: 2 SOLUTION/ DROPS OPHTHALMIC at 07:33

## 2018-01-01 RX ADMIN — LISINOPRIL 40 MG: 40 TABLET ORAL at 07:47

## 2018-01-01 RX ADMIN — AMOXICILLIN AND CLAVULANATE POTASSIUM 1 TABLET: 500; 125 TABLET, FILM COATED ORAL at 06:19

## 2018-01-01 RX ADMIN — BRIMONIDINE TARTRATE 1 DROP: 2 SOLUTION/ DROPS OPHTHALMIC at 09:14

## 2018-01-01 RX ADMIN — BRIMONIDINE TARTRATE 1 DROP: 2 SOLUTION/ DROPS OPHTHALMIC at 20:57

## 2018-01-01 RX ADMIN — DORZOLAMIDE HYDROCHLORIDE AND TIMOLOL MALEATE 1 DROP: 20; 5 SOLUTION/ DROPS OPHTHALMIC at 20:22

## 2018-01-01 RX ADMIN — SODIUM CHLORIDE, POTASSIUM CHLORIDE, SODIUM LACTATE AND CALCIUM CHLORIDE: 600; 310; 30; 20 INJECTION, SOLUTION INTRAVENOUS at 10:28

## 2018-01-01 RX ADMIN — LISINOPRIL 40 MG: 20 TABLET ORAL at 08:07

## 2018-01-01 RX ADMIN — LEVOTHYROXINE SODIUM 125 MCG: 25 TABLET ORAL at 07:52

## 2018-01-01 RX ADMIN — LATANOPROST 1 DROP: 50 SOLUTION OPHTHALMIC at 21:42

## 2018-01-01 RX ADMIN — DORZOLAMIDE HYDROCHLORIDE AND TIMOLOL MALEATE 1 DROP: 20; 5 SOLUTION/ DROPS OPHTHALMIC at 20:41

## 2018-01-01 RX ADMIN — LATANOPROST 1 DROP: 50 SOLUTION OPHTHALMIC at 22:36

## 2018-01-01 RX ADMIN — LEVOTHYROXINE SODIUM 125 MCG: 25 TABLET ORAL at 06:29

## 2018-01-01 RX ADMIN — LISINOPRIL 20 MG: 20 TABLET ORAL at 08:13

## 2018-01-01 RX ADMIN — LISINOPRIL 40 MG: 40 TABLET ORAL at 07:52

## 2018-01-01 RX ADMIN — LISINOPRIL 40 MG: 40 TABLET ORAL at 07:50

## 2018-01-01 RX ADMIN — LATANOPROST 1 DROP: 50 SOLUTION/ DROPS OPHTHALMIC at 20:38

## 2018-01-01 RX ADMIN — LISINOPRIL 20 MG: 20 TABLET ORAL at 21:10

## 2018-01-01 RX ADMIN — LISINOPRIL 20 MG: 20 TABLET ORAL at 09:17

## 2018-01-01 RX ADMIN — AMOXICILLIN AND CLAVULANATE POTASSIUM 1 TABLET: 500; 125 TABLET, FILM COATED ORAL at 13:44

## 2018-01-01 RX ADMIN — HYDROCHLOROTHIAZIDE 25 MG: 12.5 TABLET ORAL at 20:32

## 2018-01-01 RX ADMIN — DORZOLAMIDE HYDROCHLORIDE AND TIMOLOL MALEATE 1 DROP: 20; 5 SOLUTION/ DROPS OPHTHALMIC at 09:24

## 2018-01-01 RX ADMIN — BRIMONIDINE TARTRATE 1 DROP: 2 SOLUTION OPHTHALMIC at 07:47

## 2018-01-01 RX ADMIN — DORZOLAMIDE HYDROCHLORIDE AND TIMOLOL MALEATE 1 DROP: 20; 5 SOLUTION/ DROPS OPHTHALMIC at 20:42

## 2018-01-01 RX ADMIN — BRIMONIDINE TARTRATE 1 DROP: 2 SOLUTION/ DROPS OPHTHALMIC at 08:59

## 2018-01-01 RX ADMIN — LATANOPROST 1 DROP: 50 SOLUTION/ DROPS OPHTHALMIC at 20:07

## 2018-01-01 RX ADMIN — BRIMONIDINE TARTRATE 1 DROP: 2 SOLUTION/ DROPS OPHTHALMIC at 07:26

## 2018-01-01 RX ADMIN — LISINOPRIL 20 MG: 20 TABLET ORAL at 20:57

## 2018-01-01 RX ADMIN — BRIMONIDINE TARTRATE 1 DROP: 2 SOLUTION/ DROPS OPHTHALMIC at 07:52

## 2018-01-01 RX ADMIN — ONDANSETRON 4 MG: 2 INJECTION INTRAMUSCULAR; INTRAVENOUS at 11:39

## 2018-01-01 RX ADMIN — LIDOCAINE HYDROCHLORIDE 5 ML: 10 INJECTION, SOLUTION EPIDURAL; INFILTRATION; INTRACAUDAL; PERINEURAL at 11:07

## 2018-01-01 RX ADMIN — DORZOLAMIDE HYDROCHLORIDE AND TIMOLOL MALEATE 1 DROP: 20; 5 SOLUTION/ DROPS OPHTHALMIC at 09:18

## 2018-01-01 RX ADMIN — LIDOCAINE HYDROCHLORIDE 100 MG: 20 INJECTION, SOLUTION INFILTRATION; PERINEURAL at 10:40

## 2018-01-01 RX ADMIN — LATANOPROST 1 DROP: 50 SOLUTION/ DROPS OPHTHALMIC at 20:53

## 2018-01-01 RX ADMIN — BRIMONIDINE TARTRATE 1 DROP: 2 SOLUTION OPHTHALMIC at 01:47

## 2018-01-01 RX ADMIN — AMOXICILLIN AND CLAVULANATE POTASSIUM 1 TABLET: 500; 125 TABLET, FILM COATED ORAL at 06:06

## 2018-01-01 RX ADMIN — HYDROCHLOROTHIAZIDE 25 MG: 25 TABLET ORAL at 07:47

## 2018-01-01 RX ADMIN — FENTANYL CITRATE 100 MCG: 50 INJECTION, SOLUTION INTRAMUSCULAR; INTRAVENOUS at 10:34

## 2018-01-01 RX ADMIN — ROCURONIUM BROMIDE 100 MG: 10 INJECTION INTRAVENOUS at 10:45

## 2018-01-01 RX ADMIN — DIPHENHYDRAMINE HYDROCHLORIDE 50 MG: 25 CAPSULE ORAL at 19:41

## 2018-01-01 RX ADMIN — CEFAZOLIN 3 G: 1 INJECTION, POWDER, FOR SOLUTION INTRAMUSCULAR; INTRAVENOUS at 11:10

## 2018-01-01 RX ADMIN — PROPOFOL 100 MG: 10 INJECTION, EMULSION INTRAVENOUS at 10:43

## 2018-01-01 RX ADMIN — AMOXICILLIN AND CLAVULANATE POTASSIUM 1 TABLET: 500; 125 TABLET, FILM COATED ORAL at 06:01

## 2018-01-01 RX ADMIN — LISINOPRIL 20 MG: 20 TABLET ORAL at 08:03

## 2018-01-01 RX ADMIN — BRIMONIDINE TARTRATE 1 DROP: 2 SOLUTION OPHTHALMIC at 23:07

## 2018-01-01 RX ADMIN — BRIMONIDINE TARTRATE 1 DROP: 2 SOLUTION/ DROPS OPHTHALMIC at 21:10

## 2018-01-01 RX ADMIN — BRIMONIDINE TARTRATE 1 DROP: 2 SOLUTION/ DROPS OPHTHALMIC at 20:08

## 2018-01-01 RX ADMIN — DORZOLAMIDE HYDROCHLORIDE AND TIMOLOL MALEATE 1 DROP: 20; 5 SOLUTION/ DROPS OPHTHALMIC at 07:54

## 2018-01-01 RX ADMIN — LATANOPROST 1 DROP: 50 SOLUTION OPHTHALMIC at 23:37

## 2018-01-01 RX ADMIN — DORZOLAMIDE HYDROCHLORIDE AND TIMOLOL MALEATE 1 DROP: 20; 5 SOLUTION/ DROPS OPHTHALMIC at 07:56

## 2018-01-01 RX ADMIN — HYDROCHLOROTHIAZIDE 25 MG: 25 TABLET ORAL at 07:46

## 2018-01-01 RX ADMIN — BRIMONIDINE TARTRATE 1 DROP: 2 SOLUTION OPHTHALMIC at 15:14

## 2018-01-01 RX ADMIN — LEVOTHYROXINE SODIUM 125 MCG: 25 TABLET ORAL at 07:49

## 2018-01-01 RX ADMIN — AMOXICILLIN AND CLAVULANATE POTASSIUM 1 TABLET: 500; 125 TABLET, FILM COATED ORAL at 21:22

## 2018-01-01 RX ADMIN — AMOXICILLIN AND CLAVULANATE POTASSIUM 1 TABLET: 500; 125 TABLET, FILM COATED ORAL at 21:18

## 2018-01-01 RX ADMIN — HYDROCHLOROTHIAZIDE 25 MG: 25 TABLET ORAL at 09:23

## 2018-01-01 RX ADMIN — DORZOLAMIDE HYDROCHLORIDE AND TIMOLOL MALEATE 1 DROP: 20; 5 SOLUTION/ DROPS OPHTHALMIC at 20:58

## 2018-01-01 RX ADMIN — DIPHENHYDRAMINE HYDROCHLORIDE 50 MG: 25 CAPSULE ORAL at 19:14

## 2018-01-01 RX ADMIN — DORZOLAMIDE HYDROCHLORIDE AND TIMOLOL MALEATE 1 DROP: 20; 5 SOLUTION/ DROPS OPHTHALMIC at 19:57

## 2018-01-01 RX ADMIN — HUMAN IMMUNOGLOBULIN G 40 G: 40 LIQUID INTRAVENOUS at 20:37

## 2018-01-01 RX ADMIN — SENNOSIDES AND DOCUSATE SODIUM 1 TABLET: 8.6; 5 TABLET ORAL at 08:13

## 2018-01-01 RX ADMIN — DORZOLAMIDE HYDROCHLORIDE AND TIMOLOL MALEATE 1 DROP: 20; 5 SOLUTION/ DROPS OPHTHALMIC at 20:01

## 2018-01-01 RX ADMIN — DORZOLAMIDE HYDROCHLORIDE AND TIMOLOL MALEATE 1 DROP: 20; 5 SOLUTION/ DROPS OPHTHALMIC at 07:58

## 2018-01-01 RX ADMIN — AMOXICILLIN AND CLAVULANATE POTASSIUM 1 TABLET: 500; 125 TABLET, FILM COATED ORAL at 06:16

## 2018-01-01 RX ADMIN — LISINOPRIL 20 MG: 20 TABLET ORAL at 07:57

## 2018-01-01 RX ADMIN — BRIMONIDINE TARTRATE 1 DROP: 2 SOLUTION/ DROPS OPHTHALMIC at 20:34

## 2018-01-01 RX ADMIN — Medication 5 MG: at 10:59

## 2018-01-01 RX ADMIN — PHENYLEPHRINE HYDROCHLORIDE 150 MCG: 10 INJECTION, SOLUTION INTRAMUSCULAR; INTRAVENOUS; SUBCUTANEOUS at 11:28

## 2018-01-01 RX ADMIN — ENOXAPARIN SODIUM 40 MG: 40 INJECTION SUBCUTANEOUS at 19:24

## 2018-01-01 RX ADMIN — LEVOTHYROXINE SODIUM 125 MCG: 25 TABLET ORAL at 06:01

## 2018-01-01 RX ADMIN — DORZOLAMIDE HYDROCHLORIDE AND TIMOLOL MALEATE 1 DROP: 20; 5 SOLUTION/ DROPS OPHTHALMIC at 20:27

## 2018-01-01 RX ADMIN — ACETAMINOPHEN 650 MG: 325 TABLET, FILM COATED ORAL at 20:03

## 2018-01-01 RX ADMIN — LATANOPROST 1 DROP: 50 SOLUTION/ DROPS OPHTHALMIC at 21:10

## 2018-01-01 RX ADMIN — AMOXICILLIN AND CLAVULANATE POTASSIUM 1 TABLET: 500; 125 TABLET, FILM COATED ORAL at 21:10

## 2018-01-01 RX ADMIN — AMOXICILLIN AND CLAVULANATE POTASSIUM 1 TABLET: 500; 125 TABLET, FILM COATED ORAL at 20:24

## 2018-01-01 RX ADMIN — DORZOLAMIDE HYDROCHLORIDE AND TIMOLOL MALEATE 1 DROP: 20; 5 SOLUTION/ DROPS OPHTHALMIC at 20:03

## 2018-01-01 RX ADMIN — HYDROCHLOROTHIAZIDE 25 MG: 25 TABLET ORAL at 08:42

## 2018-01-01 RX ADMIN — DORZOLAMIDE HYDROCHLORIDE AND TIMOLOL MALEATE 1 DROP: 20; 5 SOLUTION/ DROPS OPHTHALMIC at 20:28

## 2018-01-01 RX ADMIN — HYDROCHLOROTHIAZIDE 25 MG: 12.5 TABLET ORAL at 07:32

## 2018-01-01 RX ADMIN — LEVOTHYROXINE SODIUM 125 MCG: 25 TABLET ORAL at 06:06

## 2018-01-01 RX ADMIN — AMOXICILLIN AND CLAVULANATE POTASSIUM 1 TABLET: 500; 125 TABLET, FILM COATED ORAL at 13:57

## 2018-01-01 RX ADMIN — LISINOPRIL 40 MG: 40 TABLET ORAL at 09:23

## 2018-01-01 RX ADMIN — BRIMONIDINE TARTRATE 1 DROP: 2 SOLUTION OPHTHALMIC at 19:40

## 2018-01-01 RX ADMIN — BRIMONIDINE TARTRATE 1 DROP: 2 SOLUTION OPHTHALMIC at 16:29

## 2018-01-01 RX ADMIN — BRIMONIDINE TARTRATE 1 DROP: 2 SOLUTION/ DROPS OPHTHALMIC at 07:57

## 2018-01-01 RX ADMIN — BRIMONIDINE TARTRATE 1 DROP: 2 SOLUTION OPHTHALMIC at 14:34

## 2018-01-01 RX ADMIN — AMOXICILLIN AND CLAVULANATE POTASSIUM 1 TABLET: 500; 125 TABLET, FILM COATED ORAL at 21:37

## 2018-01-01 RX ADMIN — BRIMONIDINE TARTRATE 1 DROP: 2 SOLUTION/ DROPS OPHTHALMIC at 08:01

## 2018-01-01 RX ADMIN — LISINOPRIL 20 MG: 20 TABLET ORAL at 07:49

## 2018-01-01 RX ADMIN — HYDROCHLOROTHIAZIDE 25 MG: 12.5 TABLET ORAL at 08:13

## 2018-01-01 RX ADMIN — DORZOLAMIDE HYDROCHLORIDE AND TIMOLOL MALEATE 1 DROP: 20; 5 SOLUTION/ DROPS OPHTHALMIC at 07:44

## 2018-01-01 RX ADMIN — LISINOPRIL 20 MG: 20 TABLET ORAL at 06:20

## 2018-01-01 RX ADMIN — LISINOPRIL 20 MG: 20 TABLET ORAL at 20:32

## 2018-01-01 RX ADMIN — BRIMONIDINE TARTRATE 1 DROP: 2 SOLUTION OPHTHALMIC at 23:25

## 2018-01-01 RX ADMIN — DIPHENHYDRAMINE HYDROCHLORIDE 50 MG: 25 CAPSULE ORAL at 19:23

## 2018-01-01 RX ADMIN — BRIMONIDINE TARTRATE 1 DROP: 2 SOLUTION/ DROPS OPHTHALMIC at 20:32

## 2018-01-01 RX ADMIN — LISINOPRIL 20 MG: 20 TABLET ORAL at 19:59

## 2018-01-01 RX ADMIN — PHENYLEPHRINE HYDROCHLORIDE 100 MCG: 10 INJECTION, SOLUTION INTRAMUSCULAR; INTRAVENOUS; SUBCUTANEOUS at 10:59

## 2018-01-01 RX ADMIN — DORZOLAMIDE HYDROCHLORIDE AND TIMOLOL MALEATE 1 DROP: 20; 5 SOLUTION/ DROPS OPHTHALMIC at 21:57

## 2018-01-01 RX ADMIN — DORZOLAMIDE HYDROCHLORIDE AND TIMOLOL MALEATE 1 DROP: 20; 5 SOLUTION/ DROPS OPHTHALMIC at 08:08

## 2018-01-01 RX ADMIN — ACETAMINOPHEN 650 MG: 325 TABLET, FILM COATED ORAL at 19:23

## 2018-01-01 RX ADMIN — DORZOLAMIDE HYDROCHLORIDE AND TIMOLOL MALEATE 1 DROP: 20; 5 SOLUTION/ DROPS OPHTHALMIC at 08:48

## 2018-01-01 RX ADMIN — LISINOPRIL 40 MG: 40 TABLET ORAL at 08:42

## 2018-01-01 RX ADMIN — BRIMONIDINE TARTRATE 1 DROP: 2 SOLUTION OPHTHALMIC at 07:19

## 2018-01-01 RX ADMIN — LATANOPROST 1 DROP: 50 SOLUTION/ DROPS OPHTHALMIC at 20:58

## 2018-01-01 RX ADMIN — LATANOPROST 1 DROP: 50 SOLUTION/ DROPS OPHTHALMIC at 21:01

## 2018-01-01 RX ADMIN — PROPOFOL 50 MG: 10 INJECTION, EMULSION INTRAVENOUS at 10:46

## 2018-01-01 RX ADMIN — SENNOSIDES AND DOCUSATE SODIUM 1 TABLET: 8.6; 5 TABLET ORAL at 18:54

## 2018-01-01 RX ADMIN — HYDROCHLOROTHIAZIDE 25 MG: 12.5 TABLET ORAL at 07:49

## 2018-01-01 RX ADMIN — LATANOPROST 1 DROP: 50 SOLUTION/ DROPS OPHTHALMIC at 21:23

## 2018-01-01 RX ADMIN — BRIMONIDINE TARTRATE 1 DROP: 2 SOLUTION/ DROPS OPHTHALMIC at 20:52

## 2018-01-01 RX ADMIN — LEVOTHYROXINE SODIUM 125 MCG: 25 TABLET ORAL at 06:04

## 2018-01-01 RX ADMIN — HYDROCHLOROTHIAZIDE 25 MG: 12.5 TABLET ORAL at 20:56

## 2018-01-01 RX ADMIN — BRIMONIDINE TARTRATE 1 DROP: 2 SOLUTION/ DROPS OPHTHALMIC at 20:00

## 2018-01-01 RX ADMIN — BRIMONIDINE TARTRATE 1 DROP: 2 SOLUTION/ DROPS OPHTHALMIC at 21:22

## 2018-01-01 RX ADMIN — HUMAN IMMUNOGLOBULIN G 40 G: 40 LIQUID INTRAVENOUS at 20:58

## 2018-01-01 RX ADMIN — LEVOTHYROXINE SODIUM 125 MCG: 25 TABLET ORAL at 08:41

## 2018-01-01 RX ADMIN — HYDROCHLOROTHIAZIDE 25 MG: 12.5 TABLET ORAL at 08:06

## 2018-01-01 RX ADMIN — LATANOPROST 1 DROP: 50 SOLUTION OPHTHALMIC at 21:24

## 2018-01-01 RX ADMIN — DORZOLAMIDE HYDROCHLORIDE AND TIMOLOL MALEATE 1 DROP: 20; 5 SOLUTION/ DROPS OPHTHALMIC at 08:43

## 2018-01-01 RX ADMIN — AMOXICILLIN AND CLAVULANATE POTASSIUM 1 TABLET: 500; 125 TABLET, FILM COATED ORAL at 06:29

## 2018-01-01 RX ADMIN — GADOBUTROL 10 ML: 604.72 INJECTION INTRAVENOUS at 14:49

## 2018-01-01 RX ADMIN — BRIMONIDINE TARTRATE 1 DROP: 2 SOLUTION OPHTHALMIC at 21:42

## 2018-01-01 RX ADMIN — HYDROCHLOROTHIAZIDE 25 MG: 12.5 TABLET ORAL at 19:59

## 2018-01-01 RX ADMIN — ACETAMINOPHEN 650 MG: 325 TABLET, FILM COATED ORAL at 19:41

## 2018-01-01 RX ADMIN — BRIMONIDINE TARTRATE 1 DROP: 2 SOLUTION OPHTHALMIC at 23:36

## 2018-01-01 RX ADMIN — HYDROCHLOROTHIAZIDE 25 MG: 12.5 TABLET ORAL at 06:19

## 2018-01-01 RX ADMIN — LIDOCAINE HYDROCHLORIDE 5 ML: 10 INJECTION, SOLUTION EPIDURAL; INFILTRATION; INTRACAUDAL; PERINEURAL at 11:01

## 2018-01-01 RX ADMIN — LEVOTHYROXINE SODIUM 125 MCG: 25 TABLET ORAL at 06:19

## 2018-01-01 RX ADMIN — DORZOLAMIDE HYDROCHLORIDE AND TIMOLOL MALEATE 1 DROP: 20; 5 SOLUTION/ DROPS OPHTHALMIC at 20:52

## 2018-01-01 RX ADMIN — ACETAMINOPHEN 650 MG: 325 TABLET, FILM COATED ORAL at 13:12

## 2018-01-01 RX ADMIN — Medication 10 MG: at 10:49

## 2018-01-01 RX ADMIN — DIPHENHYDRAMINE HYDROCHLORIDE 50 MG: 25 CAPSULE ORAL at 20:40

## 2018-01-01 RX ADMIN — ACETAMINOPHEN 650 MG: 325 TABLET, FILM COATED ORAL at 19:14

## 2018-01-01 RX ADMIN — LEVOTHYROXINE SODIUM 125 MCG: 25 TABLET ORAL at 06:00

## 2018-01-01 RX ADMIN — DORZOLAMIDE HYDROCHLORIDE AND TIMOLOL MALEATE 1 DROP: 20; 5 SOLUTION/ DROPS OPHTHALMIC at 20:57

## 2018-01-01 RX ADMIN — HYDROCHLOROTHIAZIDE 25 MG: 12.5 TABLET ORAL at 07:57

## 2018-01-01 RX ADMIN — DORZOLAMIDE HYDROCHLORIDE AND TIMOLOL MALEATE 1 DROP: 20; 5 SOLUTION/ DROPS OPHTHALMIC at 21:23

## 2018-01-01 RX ADMIN — LISINOPRIL 20 MG: 20 TABLET ORAL at 07:26

## 2018-01-01 RX ADMIN — HYDROCHLOROTHIAZIDE 25 MG: 25 TABLET ORAL at 07:18

## 2018-01-01 RX ADMIN — LISINOPRIL 40 MG: 40 TABLET ORAL at 07:45

## 2018-01-01 RX ADMIN — LEVOTHYROXINE SODIUM 125 MCG: 25 TABLET ORAL at 06:59

## 2018-01-01 RX ADMIN — BRIMONIDINE TARTRATE 1 DROP: 2 SOLUTION OPHTHALMIC at 16:30

## 2018-01-01 RX ADMIN — LIDOCAINE HYDROCHLORIDE: 10 INJECTION, SOLUTION EPIDURAL; INFILTRATION; INTRACAUDAL; PERINEURAL at 17:00

## 2018-01-01 RX ADMIN — HYDROCHLOROTHIAZIDE 25 MG: 12.5 TABLET ORAL at 08:07

## 2018-01-01 RX ADMIN — AMOXICILLIN AND CLAVULANATE POTASSIUM 1 TABLET: 500; 125 TABLET, FILM COATED ORAL at 13:20

## 2018-01-01 RX ADMIN — LISINOPRIL 40 MG: 20 TABLET ORAL at 08:06

## 2018-01-01 RX ADMIN — BRIMONIDINE TARTRATE 1 DROP: 2 SOLUTION/ DROPS OPHTHALMIC at 08:08

## 2018-01-01 RX ADMIN — BRIMONIDINE TARTRATE 1 DROP: 2 SOLUTION/ DROPS OPHTHALMIC at 20:23

## 2018-01-01 RX ADMIN — LEVOTHYROXINE SODIUM 125 MCG: 25 TABLET ORAL at 09:23

## 2018-01-01 RX ADMIN — HUMAN IMMUNOGLOBULIN G 40 G: 40 LIQUID INTRAVENOUS at 21:23

## 2018-01-01 RX ADMIN — DORZOLAMIDE HYDROCHLORIDE AND TIMOLOL MALEATE 1 DROP: 20; 5 SOLUTION/ DROPS OPHTHALMIC at 07:19

## 2018-01-01 RX ADMIN — LISINOPRIL 40 MG: 40 TABLET ORAL at 07:18

## 2018-01-01 RX ADMIN — LEVOTHYROXINE SODIUM 125 MCG: 25 TABLET ORAL at 05:57

## 2018-01-01 RX ADMIN — AMOXICILLIN AND CLAVULANATE POTASSIUM 1 TABLET: 500; 125 TABLET, FILM COATED ORAL at 13:09

## 2018-01-01 RX ADMIN — LISINOPRIL 20 MG: 20 TABLET ORAL at 20:34

## 2018-01-01 RX ADMIN — AMOXICILLIN AND CLAVULANATE POTASSIUM 1 TABLET: 500; 125 TABLET, FILM COATED ORAL at 13:25

## 2018-01-01 RX ADMIN — AMOXICILLIN AND CLAVULANATE POTASSIUM 1 TABLET: 500; 125 TABLET, FILM COATED ORAL at 21:55

## 2018-01-01 RX ADMIN — PHENYLEPHRINE HYDROCHLORIDE 100 MCG: 10 INJECTION, SOLUTION INTRAMUSCULAR; INTRAVENOUS; SUBCUTANEOUS at 11:40

## 2018-01-01 RX ADMIN — PHENYLEPHRINE HYDROCHLORIDE 100 MCG: 10 INJECTION, SOLUTION INTRAMUSCULAR; INTRAVENOUS; SUBCUTANEOUS at 11:14

## 2018-01-01 RX ADMIN — LISINOPRIL 40 MG: 20 TABLET ORAL at 07:54

## 2018-01-01 RX ADMIN — LATANOPROST 1 DROP: 50 SOLUTION/ DROPS OPHTHALMIC at 20:51

## 2018-01-01 RX ADMIN — DORZOLAMIDE HYDROCHLORIDE AND TIMOLOL MALEATE 1 DROP: 20; 5 SOLUTION/ DROPS OPHTHALMIC at 07:33

## 2018-01-01 RX ADMIN — LATANOPROST 1 DROP: 50 SOLUTION OPHTHALMIC at 21:37

## 2018-01-01 RX ADMIN — HYDROCHLOROTHIAZIDE 25 MG: 12.5 TABLET ORAL at 09:17

## 2018-01-01 RX ADMIN — Medication 1 MG: at 03:28

## 2018-01-01 RX ADMIN — BRIMONIDINE TARTRATE 1 DROP: 2 SOLUTION OPHTHALMIC at 07:52

## 2018-01-01 RX ADMIN — HYDROCHLOROTHIAZIDE 25 MG: 12.5 TABLET ORAL at 07:54

## 2018-01-01 RX ADMIN — HUMAN IMMUNOGLOBULIN G 40 G: 40 LIQUID INTRAVENOUS at 20:09

## 2018-01-01 RX ADMIN — DIPHENHYDRAMINE HYDROCHLORIDE 50 MG: 25 CAPSULE ORAL at 20:03

## 2018-01-01 RX ADMIN — DORZOLAMIDE HYDROCHLORIDE AND TIMOLOL MALEATE 1 DROP: 20; 5 SOLUTION/ DROPS OPHTHALMIC at 21:10

## 2018-01-01 RX ADMIN — LATANOPROST 1 DROP: 50 SOLUTION OPHTHALMIC at 21:13

## 2018-01-01 RX ADMIN — LATANOPROST 1 DROP: 50 SOLUTION/ DROPS OPHTHALMIC at 20:39

## 2018-01-01 RX ADMIN — BRIMONIDINE TARTRATE 1 DROP: 2 SOLUTION/ DROPS OPHTHALMIC at 20:58

## 2018-01-01 RX ADMIN — AMOXICILLIN AND CLAVULANATE POTASSIUM 1 TABLET: 500; 125 TABLET, FILM COATED ORAL at 05:55

## 2018-01-01 RX ADMIN — LISINOPRIL 20 MG: 20 TABLET ORAL at 07:33

## 2018-01-01 RX ADMIN — LATANOPROST 1 DROP: 50 SOLUTION/ DROPS OPHTHALMIC at 21:57

## 2018-01-01 RX ADMIN — HYDROCHLOROTHIAZIDE 25 MG: 25 TABLET ORAL at 07:49

## 2018-01-01 RX ADMIN — HYDROCHLOROTHIAZIDE 25 MG: 12.5 TABLET ORAL at 08:48

## 2018-01-01 RX ADMIN — HYDROCHLOROTHIAZIDE 25 MG: 12.5 TABLET ORAL at 08:03

## 2018-01-01 RX ADMIN — LEVOTHYROXINE SODIUM 125 MCG: 25 TABLET ORAL at 06:20

## 2018-01-01 RX ADMIN — BRIMONIDINE TARTRATE 1 DROP: 2 SOLUTION/ DROPS OPHTHALMIC at 08:14

## 2018-01-01 RX ADMIN — LATANOPROST 1 DROP: 50 SOLUTION/ DROPS OPHTHALMIC at 21:17

## 2018-01-01 RX ADMIN — ACETAMINOPHEN 650 MG: 325 TABLET, FILM COATED ORAL at 20:40

## 2018-01-01 RX ADMIN — DORZOLAMIDE HYDROCHLORIDE AND TIMOLOL MALEATE 1 DROP: 20; 5 SOLUTION/ DROPS OPHTHALMIC at 07:53

## 2018-01-01 RX ADMIN — BRIMONIDINE TARTRATE 1 DROP: 2 SOLUTION/ DROPS OPHTHALMIC at 21:57

## 2018-01-01 RX ADMIN — PHENYLEPHRINE HYDROCHLORIDE 150 MCG: 10 INJECTION, SOLUTION INTRAMUSCULAR; INTRAVENOUS; SUBCUTANEOUS at 11:21

## 2018-01-01 RX ADMIN — BRIMONIDINE TARTRATE 1 DROP: 2 SOLUTION OPHTHALMIC at 06:15

## 2018-01-01 RX ADMIN — BRIMONIDINE TARTRATE 1 DROP: 2 SOLUTION OPHTHALMIC at 05:42

## 2018-01-01 RX ADMIN — LISINOPRIL 20 MG: 20 TABLET ORAL at 21:56

## 2018-01-01 RX ADMIN — BRIMONIDINE TARTRATE 1 DROP: 2 SOLUTION/ DROPS OPHTHALMIC at 20:26

## 2018-01-01 RX ADMIN — SUGAMMADEX 400 MG: 100 INJECTION, SOLUTION INTRAVENOUS at 12:12

## 2018-01-01 RX ADMIN — BRIMONIDINE TARTRATE 1 DROP: 2 SOLUTION OPHTHALMIC at 16:33

## 2018-01-01 RX ADMIN — BRIMONIDINE TARTRATE 1 DROP: 2 SOLUTION/ DROPS OPHTHALMIC at 07:50

## 2018-01-01 RX ADMIN — AMOXICILLIN AND CLAVULANATE POTASSIUM 1 TABLET: 500; 125 TABLET, FILM COATED ORAL at 14:24

## 2018-01-01 RX ADMIN — LATANOPROST 1 DROP: 50 SOLUTION OPHTHALMIC at 20:45

## 2018-01-01 ASSESSMENT — ENCOUNTER SYMPTOMS
STIFFNESS: 1
DISTURBANCES IN COORDINATION: 0
WEIGHT LOSS: 0
WHEEZING: 0
RECTAL PAIN: 0
SHORTNESS OF BREATH: 1
WEIGHT GAIN: 0
SORE THROAT: 0
POSTURAL DYSPNEA: 0
PANIC: 0
TINGLING: 0
ARTHRALGIAS: 1
SINUS CONGESTION: 0
COUGH DISTURBING SLEEP: 1
MEMORY LOSS: 0
MYALGIAS: 0
MUSCLE CRAMPS: 0
ABDOMINAL PAIN: 0
DYSPNEA ON EXERTION: 1
SNORES LOUDLY: 0
FEVER: 0
JAUNDICE: 0
WEIGHT LOSS: 1
NECK MASS: 0
MEMORY LOSS: 0
HOARSE VOICE: 0
HEADACHES: 0
WEAKNESS: 1
DYSPNEA ON EXERTION: 0
HEMOPTYSIS: 0
DISTURBANCES IN COORDINATION: 0
SEIZURES: 0
TREMORS: 0
INCREASED ENERGY: 1
SPEECH CHANGE: 0
TASTE DISTURBANCE: 0
DEPRESSION: 1
NUMBNESS: 1
NERVOUS/ANXIOUS: 1
POLYPHAGIA: 0
SYNCOPE: 0
SEIZURES: 0
HEMOPTYSIS: 0
HALLUCINATIONS: 0
POLYDIPSIA: 0
DIZZINESS: 1
NUMBNESS: 1
MYALGIAS: 0
SPUTUM PRODUCTION: 1
INCREASED ENERGY: 1
NECK PAIN: 0
MUSCLE WEAKNESS: 1
POSTURAL DYSPNEA: 0
WEIGHT GAIN: 0
SLEEP DISTURBANCES DUE TO BREATHING: 0
INSOMNIA: 1
COUGH DISTURBING SLEEP: 1
TINGLING: 0
FEVER: 0
SORE THROAT: 0
WEAKNESS: 1
CONSTIPATION: 1
NERVOUS/ANXIOUS: 1
COUGH: 1
DECREASED CONCENTRATION: 0
PANIC: 0
FATIGUE: 1
WHEEZING: 0
JOINT SWELLING: 0
TROUBLE SWALLOWING: 0
ORTHOPNEA: 0
HEARTBURN: 0
TROUBLE SWALLOWING: 0
ARTHRALGIAS: 0
VOMITING: 0
CHILLS: 1
PARALYSIS: 1
DIZZINESS: 1
FATIGUE: 1
SMELL DISTURBANCE: 0
TASTE DISTURBANCE: 0
POLYPHAGIA: 0
MUSCLE CRAMPS: 0
NIGHT SWEATS: 1
COUGH: 1
HALLUCINATIONS: 0
NIGHT SWEATS: 0
NECK MASS: 0
SPUTUM PRODUCTION: 1
BLOOD IN STOOL: 0
SHORTNESS OF BREATH: 0
DEPRESSION: 1
SINUS PAIN: 1
EXERCISE INTOLERANCE: 1
DECREASED CONCENTRATION: 0
DECREASED APPETITE: 0
STIFFNESS: 1
SINUS PAIN: 0
BOWEL INCONTINENCE: 0
HYPOTENSION: 0
JOINT SWELLING: 0
BLOATING: 0
SINUS CONGESTION: 1
HOARSE VOICE: 0
HYPERTENSION: 1
BACK PAIN: 1
HEADACHES: 0
SMELL DISTURBANCE: 0
TREMORS: 0
PALPITATIONS: 0
SPEECH CHANGE: 0
LOSS OF CONSCIOUSNESS: 0
INSOMNIA: 0
NECK PAIN: 1
DECREASED APPETITE: 1
LOSS OF CONSCIOUSNESS: 0
ALTERED TEMPERATURE REGULATION: 0
DIARRHEA: 0
CHILLS: 0
BACK PAIN: 1
ALTERED TEMPERATURE REGULATION: 0
MUSCLE WEAKNESS: 1
LEG PAIN: 0
LIGHT-HEADEDNESS: 0
SNORES LOUDLY: 0
POLYDIPSIA: 0
NAUSEA: 0
PARALYSIS: 1

## 2018-01-01 ASSESSMENT — ACTIVITIES OF DAILY LIVING (ADL)
ADLS_ACUITY_SCORE: 20
ADLS_ACUITY_SCORE: 18
ADLS_ACUITY_SCORE: 18
ADLS_ACUITY_SCORE: 21
COGNITION: 0 - NO COGNITION ISSUES REPORTED
ADLS_ACUITY_SCORE: 20
ADLS_ACUITY_SCORE: 18
ADLS_ACUITY_SCORE: 21
ADLS_ACUITY_SCORE: 18
ADLS_ACUITY_SCORE: 17
ADLS_ACUITY_SCORE: 20
ADLS_ACUITY_SCORE: 18
ADLS_ACUITY_SCORE: 21
PREVIOUS_RESPONSIBILITIES: MEAL PREP;HOUSEKEEPING;MEDICATION MANAGEMENT;FINANCES
ADLS_ACUITY_SCORE: 17
ADLS_ACUITY_SCORE: 27
ADLS_ACUITY_SCORE: 14
ADLS_ACUITY_SCORE: 18
ADLS_ACUITY_SCORE: 21
ADLS_ACUITY_SCORE: 18
WHICH_OF_THE_ABOVE_FUNCTIONAL_RISKS_HAD_A_RECENT_ONSET_OR_CHANGE?: FALL HISTORY
ADLS_ACUITY_SCORE: 21
ADLS_ACUITY_SCORE: 14
ADLS_ACUITY_SCORE: 18
ADLS_ACUITY_SCORE: 18
ADLS_ACUITY_SCORE: 21
ADLS_ACUITY_SCORE: 21
ADLS_ACUITY_SCORE: 18
ADLS_ACUITY_SCORE: 18
ADLS_ACUITY_SCORE: 21
ADLS_ACUITY_SCORE: 18
SWALLOWING: 0-->SWALLOWS FOODS/LIQUIDS WITHOUT DIFFICULTY
ADLS_ACUITY_SCORE: 21
ADLS_ACUITY_SCORE: 18
ADLS_ACUITY_SCORE: 20
ADLS_ACUITY_SCORE: 18
ADLS_ACUITY_SCORE: 18
ADLS_ACUITY_SCORE: 20

## 2018-01-01 ASSESSMENT — PAIN SCALES - GENERAL
PAINLEVEL: NO PAIN (0)

## 2018-01-01 ASSESSMENT — MIFFLIN-ST. JEOR
SCORE: 2203.3
SCORE: 2183.79
SCORE: 2196.04

## 2018-01-01 ASSESSMENT — VISUAL ACUITY
OU: BASELINE
OU: BASELINE

## 2018-01-02 ENCOUNTER — TRANSFERRED RECORDS (OUTPATIENT)
Dept: HEALTH INFORMATION MANAGEMENT | Facility: CLINIC | Age: 63
End: 2018-01-02

## 2018-02-07 ENCOUNTER — TRANSFERRED RECORDS (OUTPATIENT)
Dept: HEALTH INFORMATION MANAGEMENT | Facility: CLINIC | Age: 63
End: 2018-02-07

## 2018-03-08 ENCOUNTER — TRANSFERRED RECORDS (OUTPATIENT)
Dept: HEALTH INFORMATION MANAGEMENT | Facility: CLINIC | Age: 63
End: 2018-03-08

## 2018-08-06 ENCOUNTER — TRANSFERRED RECORDS (OUTPATIENT)
Dept: HEALTH INFORMATION MANAGEMENT | Facility: CLINIC | Age: 63
End: 2018-08-06

## 2018-11-14 NOTE — PROGRESS NOTES
Records received from [facility] 11/14/18, Records sent to be scanned    Joyce Villarreal CMA

## 2018-11-15 PROBLEM — M21.371 RIGHT FOOT DROP: Status: ACTIVE | Noted: 2018-04-25

## 2018-11-15 PROBLEM — R29.6 FALLS: Status: ACTIVE | Noted: 2018-08-10

## 2018-11-15 PROBLEM — R68.89 DECREASED EXERCISE TOLERANCE: Status: ACTIVE | Noted: 2018-08-10

## 2018-11-15 PROBLEM — Z80.42 FAMILY HISTORY OF PROSTATE CANCER: Status: ACTIVE | Noted: 2017-03-08

## 2018-11-15 PROBLEM — R29.898 RIGHT LEG WEAKNESS: Status: ACTIVE | Noted: 2018-02-21

## 2018-11-15 PROBLEM — M47.816 LUMBAR SPONDYLOSIS: Status: ACTIVE | Noted: 2018-02-21

## 2018-11-15 PROBLEM — R06.09 DOE (DYSPNEA ON EXERTION): Status: ACTIVE | Noted: 2018-08-10

## 2018-11-15 PROBLEM — R53.83 OTHER FATIGUE: Status: ACTIVE | Noted: 2018-08-10

## 2018-11-15 PROBLEM — R29.898 WEAKNESS OF LOW BACK DUE TO INACTIVITY: Status: ACTIVE | Noted: 2018-02-21

## 2018-11-15 PROBLEM — Z48.02: Status: ACTIVE | Noted: 2018-03-26

## 2018-11-15 PROBLEM — Z77.090 ASBESTOS EXPOSURE: Status: ACTIVE | Noted: 2017-03-08

## 2018-11-15 NOTE — NURSING NOTE
Chief Complaint   Patient presents with     New Patient     [facility] NEW     Pre-entered medications while patient was waiting in the lobby for vitals.    Melinda Shaikh, EMT

## 2018-11-15 NOTE — PATIENT INSTRUCTIONS

## 2018-11-15 NOTE — PROGRESS NOTES
Service Date: 11/15/2018      REASON FOR VISIT:   This patient is a 63-year-old ambidextrous man seen at the request of Dr. Zaldivar for neurologic consultation with chief complaint of weakness.  He is here with his wife, Teri, and his daughter, Leslie.        HISTORY OF PRESENT ILLNESS:  The patient has a somewhat complicated history.  Over a year ago, his wife noticed that when he would walk he would be slapping his right foot.  This problem seemed to progress over a few months.  He had had a right partial knee replacement in 2010 and that was uneventful.  This was on the same side as the knee.  There was no pain.  There was no numbness or tingling.  Eventually, he had a workup including MRI imaging of the lumbar spine.  He was found to have spinal stenosis at L1-L2 with a congenitally small canal.  The patient also had neural foraminal narrowing, especially on the left at L5-S1.  He underwent surgery in March including decompression of the canal at L1-L2.  He had a laminectomy on the right at L4 and L5 and right-sided L5-S1 surgery.  The patient never really got better from the surgery.  Now his weakness has progressed.  He has developed some tingling in the right foot.  He had to use a walker after surgery.  Now he uses the walker only in the house.  Otherwise, he is pretty much confined to a wheelchair.  He can transfer.  If he goes from the living room to the bedroom he will sometimes have to rest because his legs seem to want to give out on him.  He does not have issues with bowel or bladder control.  He does not have symptoms with regard to vision, hearing, speech or swallowing.  He says that his arms are not involved but his wife disputes that.  She says that he is quite weak when he tries to push himself up.  For example, if he falls down they need to get help to get him up.  He cannot pull himself up with his arms.  He is quite muscular and strong person so this is a decline for him.  He himself says that he  will lift 5 pound weights with his arms and he thinks that there might be some weakness when he does that.  In July, he had to stop walking down his driveway which is about 300 feet.  He was no longer able to perform that activity. He does not have issues with double vision or droopy eyelids.     PAST MEDICAL HISTORY:   Significant for high blood pressure and thyroid problems.  He does not have diabetes or asthma.  He does not drink or smoke.  He has no history of head, neck or back injury.  The only pertinent surgery is the one noted above.        CURRENT MEDICATIONS:  His medication regimen was noted.        SOCIAL HISTORY:   He is  with 2 children.  He is a retired .  He does have a history of asbestos exposure, but no pulmonary involvement as far as can be determined.  He used to be quite a strong man and so his current condition is very debilitating.             FAMILY HISTORY:   Positive for diabetes in his mother.      PHYSICAL EXAMINATION:   GENERAL:  The patient is cooperative and in no distress.  He is a large man sitting in a wheelchair.     VITAL SIGNS:   His blood pressure is 156/96.     There are no carotid bruits.  Auscultation of the heart shows S1 and S2.   NEUROLOGIC:  The patient is alert, oriented and lucid.  Cranial nerve testing shows full visual fields to confrontation.  Funduscopic exam shows sharp discs bilaterally.  Eye movements are complete and conjugate without nystagmus.  Pupils react to light.  Facial sensation is normal.  Facies move symmetrically.  He has good strength of the facial muscles.  Palate elevates in the midline and tongue protrudes in the midline.  Motor evaluation shows no pronator drift, normal finger tapping and finger-nose-finger.  Heel-knee-shin could not be performed because of his weakness in his legs.  Examination of strength does not demonstrate any specific weakness in the arms or hands, although as noted he is quite a large man and at  baseline is very strong.  In his legs, he is barely antigravity on the left at knee extension and he cannot perform that motion with the right leg.  He has a right foot drop with no movement in the toes.  On the left, he does have some weakness graded at about 3+ in ankle dorsiflexion and toe dorsiflexion.  Muscle stretch reflexes show a present jaw jerk.  In the arms, reflexes are low amplitude but symmetric.  Knee reflexes are trace.  Ankle reflexes are absent.  Left toe appears upgoing and right toe is mute.  No fasciculations were noted on his exam. Sensory testing shows some reduction in temperature sense in the right foot and possibly also pinprick sensation but vibratory sense is preserved in the right great toe.  Modalities are normal in the left and in the hands.      The patient did have an EMG performed after surgery, which showed chronic radiculopathy at L4-L5 and L5-S1.  The report is not available for review.       ASSESSMENT:   1.  Paraparesis with a probable 4 extremity weakness.      DISCUSSION:  This patient is seen for evaluation of weakness.  He has a somewhat complicated history.  It began with a painless right foot drop.  His evaluation suggested multilevel degenerative change with stenosis in the lumbar spine.  He did have decompression at L1-L2 and also at multiple foraminal levels.  His symptoms have gone from bad to worse.  His exam today shows paraparesis, but there is also suggestion based on history of weakness in the arms and hands as well.  This raises the possibility of cervical myelopathy.  He does have an upgoing toe on the left.  For further evaluation, I am going to obtain MRI imaging of the cervical and thoracic spine.  Hopefully, this can be done in the next couple of days and I could see him in followup to review results.  He may need additional testing including electrodiagnostic testing.  This will depend on the results of the MRI.      Tian Thapa MD      cc:   Rafael  MD Kayley   [address] [address]         ZORAIDA ETIENNE MD             D: 11/15/2018   T: 11/15/2018   MT: AKA      Name:     LAURA POWERS   MRN:      [id number]        Account:      [id number]   :      1955           Service Date: 11/15/2018      Document: [id number]

## 2018-11-15 NOTE — MR AVS SNAPSHOT
After Visit Summary   11/15/2018    Ry Churchill    MRN: [id number]           Patient Information     Date Of Birth          1955        Visit Information        Provider Department      11/15/2018 11:00 AM Tian Thapa MD [facility] Neurology        Today's Diagnoses     Generalized muscle weakness    -  1      Care Instructions      At your visit today, we discussed your risk for falls and preventive options.    Fall Prevention  Falls often occur due to slipping, tripping or losing your balance. Millions of people fall every year and injure themselves. Here are ways to reduce your risk of falling again.    Think about your fall, was there anything that caused your fall that can be fixed, removed, or replaced?    Make your home safe by keeping walkways clear of objects you may trip over, such as electric cords.    Use non-slip pads under rugs. Don't use area rugs or small throw rugs.    Use non-slip mats in bathtubs and showers.    Install handrails and lights on staircases. The handrails should be on both sides of the stairs.    Don't walk in poorly lit areas.    Don't stand on chairs or wobbly ladders.    Use caution when reaching overhead or looking upward. This position can cause a loss of balance.    Be sure your shoes fit properly, have non-slip bottoms and are in good condition.     Wear shoes both inside and out. Don't go barefoot or wear slippers.    Be cautious when going up and down stairs, curbs, and when walking on uneven sidewalks.    If your balance is poor, consider using a cane or walker.    If your fall was related to alcohol use, stop or limit alcohol intake.     If your fall was related to use of sleeping medicines, talk to your healthcare provider about this. You may need to reduce your dosage at bedtime if you awaken during the night to go to the bathroom.      To reduce the need for nighttime bathroom trips:  ? Don't drink fluids for several hours before  going to bed  ? Empty your bladder before going to bed  ? Men can keep a urinal at the bedside    Stay as active as you can. Balance, flexibility, strength, and endurance all come from exercise. They all play a role in preventing falls. Ask your healthcare provider which types of activity are right for you.    Get your vision checked on a regular basis.    If you have pets, know where they are before you stand up or walk so you don't trip over them.    Use night lights.    Go over all your medicines with a pharmacist or other healthcare provider to see if any of them could make you more likely to fall.  Date Last Reviewed: 4/1/2018 2000-2018 [employer]. [address] [place] [address], [place] [address]. All rights reserved. This information is not intended as a substitute for professional medical care. Always follow your healthcare professional's instructions.                Follow-ups after your visit        Follow-up notes from your care team     Return in about 2 days (around 11/17/2018).      Your next 10 appointments already scheduled     Nov 16, 2018  4:00 PM CST   (Arrive by 3:30 PM)   MR CERVICAL SPINE W/O & W CONTRAST with UUMR4   [facility] [place] ([facility] [place])    [address] [facility] [address] [phone]           How do I prepare for my exam? (Food and drink instructions) **If you will be receiving sedation or general anesthesia, please see special notes below.**  How do I prepare for my exam? (Other instructions) Take your medicines as usual, unless your doctor tells you not to. You may or may not receive intravenous (IV) contrast for this exam pending the discretion of the Radiologist.  You do not need to do anything special to prepare.  **If you will be receiving sedation or general anesthesia, please see special notes below.**  What should I wear: The MRI machine uses a strong magnet. Please wear clothes without metal  (snaps, zippers). A sweatsuit works well, or we may give you a hospital gown. Please remove any body piercings and hair extensions before you arrive. You will also remove watches, jewelry, hairpins, wallets, dentures, partial dental plates and hearing aids. You may wear contact lenses, and you may be able to wear your rings. We have a safe place to keep your personal items, but it is safer to leave them at home.  How long does the exam take: Most tests take 30 to 60 minutes.  HOWEVER, IF YOUR DOCTOR PRESCRIBES ANESTHESIA please plan on spending four to five hours in the recovery room.  What should I bring:  Bring a list of your current medicines to your exam (including vitamins, minerals and over-the-counter drugs).  Do I need a :  **If you will be receiving sedation or general anesthesia, please see special notes below.**  What should I do after the exam: No Restrictions, You may resume normal activities.  What is this test: MRI (magnetic resonance imaging) uses a strong magnet and radio waves to look inside the body. An MRA (magnetic resonance angiogram) does the same thing, but it lets us look at your blood vessels. A computer turns the radio waves into pictures showing cross sections of the body, much like slices of bread. This helps us see any problems more clearly. You may receive fluid (called  contrast ) before or during your scan. The fluid helps us see the pictures better. We give the fluid through an IV (small needle in your arm).  Who should I call with questions:  Please call the Imaging Department at your exam site with any questions. Directions, parking instructions, and other information is available on our website, [email or web address].  How do I prepare if I m having sedation or anesthesia? **IMPORTANT** THE INSTRUCTIONS BELOW ARE ONLY FOR THOSE PATIENTS WHO HAVE BEEN TOLD THEY WILL RECEIVE SEDATION OR GENERAL ANESTHESIA DURING THEIR MRI PROCEDURE:  IF YOU WILL RECEIVE SEDATION (take medicine  to help you relax during your exam): You must get the medicine from your doctor before you arrive. Bring the medicine to the exam. Do not take it at home. Arrive one hour early. Bring someone who can take you home after the test. Your medicine will make you sleepy. After the exam, you may not drive, take a bus or take a taxi by yourself. No eating 8 hours before your exam. You may have clear liquids up until 4 hours before your exam. (Clear liquids include water, clear tea, black coffee and fruit juice without pulp.)  IF YOU WILL RECEIVE ANESTHESIA (be asleep for your exam): Arrive 1 1/2 hours early. Bring someone who can take you home after the test. You may not drive, take a bus or take a taxi by yourself. No eating 8 hours before your exam. You may have clear liquids up until 4 hours before your exam. (Clear liquids include water, clear tea, black coffee and fruit juice without pulp.)            Nov 16, 2018  5:00 PM CST   MR THORACIC SPINE W/O & W CONTRAST with UUMR4   [facility] [place], [facility] ([facility])    [address] [facility] [address] [phone]           How do I prepare for my exam? (Food and drink instructions) **If you will be receiving sedation or general anesthesia, please see special notes below.**  How do I prepare for my exam? (Other instructions) Take your medicines as usual, unless your doctor tells you not to. You may or may not receive intravenous (IV) contrast for this exam pending the discretion of the Radiologist.  You do not need to do anything special to prepare.  **If you will be receiving sedation or general anesthesia, please see special notes below.**  What should I wear: The MRI machine uses a strong magnet. Please wear clothes without metal (snaps, zippers). A sweatsuit works well, or we may give you a hospital gown. Please remove any body piercings and hair extensions before you arrive. You will also remove watches,  jewelry, hairpins, wallets, dentures, partial dental plates and hearing aids. You may wear contact lenses, and you may be able to wear your rings. We have a safe place to keep your personal items, but it is safer to leave them at home.  How long does the exam take: Most tests take 30 to 60 minutes.  HOWEVER, IF YOUR DOCTOR PRESCRIBES ANESTHESIA please plan on spending four to five hours in the recovery room.  What should I bring:  Bring a list of your current medicines to your exam (including vitamins, minerals and over-the-counter drugs).  Do I need a :  **If you will be receiving sedation or general anesthesia, please see special notes below.**  What should I do after the exam: No Restrictions, You may resume normal activities.  What is this test: MRI (magnetic resonance imaging) uses a strong magnet and radio waves to look inside the body. An MRA (magnetic resonance angiogram) does the same thing, but it lets us look at your blood vessels. A computer turns the radio waves into pictures showing cross sections of the body, much like slices of bread. This helps us see any problems more clearly. You may receive fluid (called  contrast ) before or during your scan. The fluid helps us see the pictures better. We give the fluid through an IV (small needle in your arm).  Who should I call with questions:  Please call the Imaging Department at your exam site with any questions. Directions, parking instructions, and other information is available on our website, [email or web address].  How do I prepare if I m having sedation or anesthesia? **IMPORTANT** THE INSTRUCTIONS BELOW ARE ONLY FOR THOSE PATIENTS WHO HAVE BEEN TOLD THEY WILL RECEIVE SEDATION OR GENERAL ANESTHESIA DURING THEIR MRI PROCEDURE:  IF YOU WILL RECEIVE SEDATION (take medicine to help you relax during your exam): You must get the medicine from your doctor before you arrive. Bring the medicine to the exam. Do not take it at home. Arrive one hour early.  Bring someone who can take you home after the test. Your medicine will make you sleepy. After the exam, you may not drive, take a bus or take a taxi by yourself. No eating 8 hours before your exam. You may have clear liquids up until 4 hours before your exam. (Clear liquids include water, clear tea, black coffee and fruit juice without pulp.)  IF YOU WILL RECEIVE ANESTHESIA (be asleep for your exam): Arrive 1 1/2 hours early. Bring someone who can take you home after the test. You may not drive, take a bus or take a taxi by yourself. No eating 8 hours before your exam. You may have clear liquids up until 4 hours before your exam. (Clear liquids include water, clear tea, black coffee and fruit juice without pulp.)            Nov 17, 2018  8:30 AM CST   (Arrive by 8:15 AM)   Return Visit with Tian Thapa MD   [facility] Neurology ([facility])    [address] [place] [id number] [phone]              Future tests that were ordered for you today     Open Future Orders        Priority Expected Expires Ordered    MRI Thoracic spine w & w/o contrast Routine  11/15/2019 11/15/2018    MRI Cervical spine w & w/o contrast Routine  11/15/2019 11/15/2018            Who to contact     Please call your clinic at [phone] to:    Ask questions about your health    Make or cancel appointments    Discuss your medicines    Learn about your test results    Speak to your doctor            Additional Information About Your Visit        MyChart Information     [employer] is an electronic gateway that provides easy, online access to your medical records. With [employer], you can request a clinic appointment, read your test results, renew a prescription or communicate with your care team.     To sign up for [employer] visit the website at [email or web address]   You will be asked to enter the access code listed below, as well as some personal information. Please follow the  "directions to create your username and password.     Your access code is: [email or web address]  Expires: 2019  5:31 AM     Your access code will  in 90 days. If you need help or a new code, please contact your [facility] or call [phone] for assistance.        Care EveryWhere ID     This is your Care EveryWhere ID. This could be used by other organizations to access your [facility] medical records  [id number]        Your Vitals Were     Pulse Temperature Respirations Height Pulse Oximetry BMI (Body Mass Index)    64 97.7  F (36.5  C) (Oral) 18 1.778 m (5' 10\") 94% 46.22 kg/m2       Blood Pressure from Last 3 Encounters:   11/15/18 (!) 150/96    Weight from Last 3 Encounters:   11/15/18 146.1 kg (322 lb 1.6 oz)               Primary Care Provider Office Phone # Fax #    Chandrakant Batista [phone]       [address] [address]        Equal Access to Services     [facility] Hospital[facility] : Hadii aad ku hadasho Soomaali, waaxda luqadaha, qaybta kaalmada adeegyada, franck kimbrough . So [facility] [phone].    ATENCIÓN: Si habla español, tiene a henry disposición servicios gratuitos de asistencia lingüística. Marie al [phone].    We comply with applicable federal civil rights laws and [place] laws. We do not discriminate on the basis of race, color, national origin, age, disability, sex, sexual orientation, or gender identity.            Thank you!     Thank you for choosing [facility] NEUROLOGY  for your care. Our goal is always to provide you with excellent care. Hearing back from our patients is one way we can continue to improve our services. Please take a few minutes to complete the written survey that you may receive in the mail after your visit with us. Thank you!             Your Updated Medication List - Protect others around you: Learn how to safely use, store and throw away your medicines at " www.disposemymeds.org.          This list is accurate as of 11/15/18 12:26 PM.  Always use your most recent med list.                   Brand Name Dispense Instructions for use Diagnosis    ALPHAGAN P 0.1 % ophthalmic solution   Generic drug:  brimonidine           dorzolamide-timolol 2-0.5 % ophthalmic solution    COSOPT     INSTILL 1 DROP IN BOTH EYES TWICE A DAY        hydrochlorothiazide 25 MG tablet    HYDRODIURIL     25 mg daily        latanoprost 0.005 % ophthalmic solution    XALATAN     Inject 1 drop into the eye        levothyroxine 125 MCG tablet    SYNTHROID/LEVOTHROID          lisinopril 40 MG tablet    PRINIVIL/ZESTRIL     40 mg daily

## 2018-11-15 NOTE — LETTER
11/15/2018       RE: yR Churchill  [address]  [facility] [address]     Dear Colleague,    Thank you for referring your patient, Ry Churchill, to the [facility] NEUROLOGY at [facility]. Please see a copy of my visit note below.    Service Date: 11/15/2018      REASON FOR VISIT:   This patient is a 63-year-old ambidextrous man seen at the request of Dr. Zaldivar for neurologic consultation with chief complaint of weakness.  He is here with his wife, Teri, and his daughter, Leslie.        HISTORY OF PRESENT ILLNESS:  The patient has a somewhat complicated history.  Over a year ago, his wife noticed that when he would walk he would be slapping his right foot.  This problem seemed to progress over a few months.  He had had a right partial knee replacement in 2010 and that was uneventful.  This was on the same side as the knee.  There was no pain.  There was no numbness or tingling.  Eventually, he had a workup including MRI imaging of the lumbar spine.  He was found to have spinal stenosis at L1-L2 with a congenitally small canal.  The patient also had neural foraminal narrowing, especially on the left at L5-S1.  He underwent surgery in March including decompression of the canal at L1-L2.  He had a laminectomy on the right at L4 and L5 and right-sided L5-S1 surgery.  The patient never really got better from the surgery.  Now his weakness has progressed.  He has developed some tingling in the right foot.  He had to use a walker after surgery.  Now he uses the walker only in the house.  Otherwise, he is pretty much confined to a wheelchair.  He can transfer.  If he goes from the living room to the bedroom he will sometimes have to rest because his legs seem to want to give out on him.  He does not have issues with bowel or bladder control.  He does not have symptoms with regard to vision, hearing, speech or swallowing.  He says that his arms are not involved but his wife  disputes that.  She says that he is quite weak when he tries to push himself up.  For example, if he falls down they need to get help to get him up.  He cannot pull himself up with his arms.  He is quite muscular and strong person so this is a decline for him.  He himself says that he will lift 5 pound weights with his arms and he thinks that there might be some weakness when he does that.  In July, he had to stop walking down his driveway which is about 300 feet.  He was no longer able to perform that activity. He does not have issues with double vision or droopy eyelids.     PAST MEDICAL HISTORY:   Significant for high blood pressure and thyroid problems.  He does not have diabetes or asthma.  He does not drink or smoke.  He has no history of head, neck or back injury.  The only pertinent surgery is the one noted above.        CURRENT MEDICATIONS:  His medication regimen was noted.        SOCIAL HISTORY:   He is  with 2 children.  He is a retired .  He does have a history of asbestos exposure, but no pulmonary involvement as far as can be determined.  He used to be quite a strong man and so his current condition is very debilitating.             FAMILY HISTORY:   Positive for diabetes in his mother.      PHYSICAL EXAMINATION:   GENERAL:  The patient is cooperative and in no distress.  He is a large man sitting in a wheelchair.     VITAL SIGNS:   His blood pressure is 156/96.     There are no carotid bruits.  Auscultation of the heart shows S1 and S2.   NEUROLOGIC:  The patient is alert, oriented and lucid.  Cranial nerve testing shows full visual fields to confrontation.  Funduscopic exam shows sharp discs bilaterally.  Eye movements are complete and conjugate without nystagmus.  Pupils react to light.  Facial sensation is normal.  Facies move symmetrically.  He has good strength of the facial muscles.  Palate elevates in the midline and tongue protrudes in the midline.  Motor evaluation shows  no pronator drift, normal finger tapping and finger-nose-finger.  Heel-knee-shin could not be performed because of his weakness in his legs.  Examination of strength does not demonstrate any specific weakness in the arms or hands, although as noted he is quite a large man and at baseline is very strong.  In his legs, he is barely antigravity on the left at knee extension and he cannot perform that motion with the right leg.  He has a right foot drop with no movement in the toes.  On the left, he does have some weakness graded at about 3+ in ankle dorsiflexion and toe dorsiflexion.  Muscle stretch reflexes show a present jaw jerk.  In the arms, reflexes are low amplitude but symmetric.  Knee reflexes are trace.  Ankle reflexes are absent.  Left toe appears upgoing and right toe is mute.  No fasciculations were noted on his exam. Sensory testing shows some reduction in temperature sense in the right foot and possibly also pinprick sensation but vibratory sense is preserved in the right great toe.  Modalities are normal in the left and in the hands.      The patient did have an EMG performed after surgery, which showed chronic radiculopathy at L4-L5 and L5-S1.  The report is not available for review.       ASSESSMENT:   1.  Paraparesis with a probable 4 extremity weakness.      DISCUSSION:  This patient is seen for evaluation of weakness.  He has a somewhat complicated history.  It began with a painless right foot drop.  His evaluation suggested multilevel degenerative change with stenosis in the lumbar spine.  He did have decompression at L1-L2 and also at multiple foraminal levels.  His symptoms have gone from bad to worse.  His exam today shows paraparesis, but there is also suggestion based on history of weakness in the arms and hands as well.  This raises the possibility of cervical myelopathy.  He does have an upgoing toe on the left.  For further evaluation, I am going to obtain MRI imaging of the cervical and  thoracic spine.  Hopefully, this can be done in the next couple of days and I could see him in followup to review results.  He may need additional testing including electrodiagnostic testing.  This will depend on the results of the MRI.        D: 11/15/2018   T: 11/15/2018   MT: AKA      Name:     LAURA POWERS   MRN:      [id number]        Account:      [id number]   :      1955           Service Date: 11/15/2018      Document: [id number]        Again, thank you for allowing me to participate in the care of your patient.      Sincerely,    Tian Thapa MD    cc:   Rafael Zaldivar MD   [address] [address]

## 2018-11-17 NOTE — MR AVS SNAPSHOT
After Visit Summary   11/17/2018    Ry Churchill    MRN: [id number]           Patient Information     Date Of Birth          1955        Visit Information        Provider Department      11/17/2018 8:30 AM Tian Thapa MD [facility] Neurology        Today's Diagnoses     Neuropathy    -  1       Follow-ups after your visit        Follow-up notes from your care team     Return in about 3 weeks (around 12/8/2018).      Your next 10 appointments already scheduled     Nov 17, 2018  9:00 AM CST   LAB with  LAB   [facility] Lab ([facility])    [address] [place] [id number] [phone]           Please do not eat 10-12 hours before your appointment if you are coming in fasting for labs on lipids, cholesterol, or glucose (sugar). This does not apply to pregnant women. Water, hot tea and black coffee (with nothing added) are okay. Do not drink other fluids, diet soda or chew gum.            Nov 21, 2018  2:15 PM CST   (Arrive by 2:00 PM)   EMG with Benjamin Collins MD   [facility] EMG ([facility])    [address] [place] [id number] [phone]           Do not use lotions or creams on the area to be tested. If you are on blood thinners (Warfarin, Coumadin, Lovenox, etc), please contact your primary care physician to check if it is safe to stop them 3 days prior to testing. If you have anxiety, please check with your referring physician to obtain anti-anxiety medication for the procedure.            Nov 23, 2018  1:15 PM CST   MR LUMBAR SPINE W/O & W CONTRAST with URMR2   [facility] [place], MRI ([facility])    [facility] [id number] [phone]           How do I prepare for my exam? (Food and drink instructions) **If you will be receiving sedation or general anesthesia, please see special notes below.**  How do  I prepare for my exam? (Other instructions) Take your medicines as usual, unless your doctor tells you not to. You may or may not receive intravenous (IV) contrast for this exam pending the discretion of the Radiologist.  You do not need to do anything special to prepare.  **If you will be receiving sedation or general anesthesia, please see special notes below.**  What should I wear: The MRI machine uses a strong magnet. Please wear clothes without metal (snaps, zippers). A sweatsuit works well, or we may give you a hospital gown. Please remove any body piercings and hair extensions before you arrive. You will also remove watches, jewelry, hairpins, wallets, dentures, partial dental plates and hearing aids. You may wear contact lenses, and you may be able to wear your rings. We have a safe place to keep your personal items, but it is safer to leave them at home.  How long does the exam take: Most tests take 30 to 60 minutes.  HOWEVER, IF YOUR DOCTOR PRESCRIBES ANESTHESIA please plan on spending four to five hours in the recovery room.  What should I bring:  Bring a list of your current medicines to your exam (including vitamins, minerals and over-the-counter drugs).  Do I need a :  **If you will be receiving sedation or general anesthesia, please see special notes below.**  What should I do after the exam: No Restrictions, You may resume normal activities.  What is this test: MRI (magnetic resonance imaging) uses a strong magnet and radio waves to look inside the body. An MRA (magnetic resonance angiogram) does the same thing, but it lets us look at your blood vessels. A computer turns the radio waves into pictures showing cross sections of the body, much like slices of bread. This helps us see any problems more clearly. You may receive fluid (called  contrast ) before or during your scan. The fluid helps us see the pictures better. We give the fluid through an IV (small needle in your arm).  Who should I  call with questions:  Please call the Imaging Department at your exam site with any questions. Directions, parking instructions, and other information is available on our website, [email or web address].  How do I prepare if I m having sedation or anesthesia? **IMPORTANT** THE INSTRUCTIONS BELOW ARE ONLY FOR THOSE PATIENTS WHO HAVE BEEN TOLD THEY WILL RECEIVE SEDATION OR GENERAL ANESTHESIA DURING THEIR MRI PROCEDURE:  IF YOU WILL RECEIVE SEDATION (take medicine to help you relax during your exam): You must get the medicine from your doctor before you arrive. Bring the medicine to the exam. Do not take it at home. Arrive one hour early. Bring someone who can take you home after the test. Your medicine will make you sleepy. After the exam, you may not drive, take a bus or take a taxi by yourself. No eating 8 hours before your exam. You may have clear liquids up until 4 hours before your exam. (Clear liquids include water, clear tea, black coffee and fruit juice without pulp.)  IF YOU WILL RECEIVE ANESTHESIA (be asleep for your exam): Arrive 1 1/2 hours early. Bring someone who can take you home after the test. You may not drive, take a bus or take a taxi by yourself. No eating 8 hours before your exam. You may have clear liquids up until 4 hours before your exam. (Clear liquids include water, clear tea, black coffee and fruit juice without pulp.)            Nov 23, 2018  2:00 PM CST   MR BRAIN W/O & W CONTRAST with URMR2   [facility] [place] ([facility])    [address] [place] [id number] [phone]           How do I prepare for my exam? (Food and drink instructions) **If you will be receiving sedation or general anesthesia, please see special notes below.**  How do I prepare for my exam? (Other instructions) Take your medicines as usual, unless your doctor tells you not to. You may or may not receive intravenous (IV) contrast for this exam pending the  discretion of the Radiologist.  You do not need to do anything special to prepare.  **If you will be receiving sedation or general anesthesia, please see special notes below.**  What should I wear: The MRI machine uses a strong magnet. Please wear clothes without metal (snaps, zippers). A sweatsuit works well, or we may give you a hospital gown. Please remove any body piercings and hair extensions before you arrive. You will also remove watches, jewelry, hairpins, wallets, dentures, partial dental plates and hearing aids. You may wear contact lenses, and you may be able to wear your rings. We have a safe place to keep your personal items, but it is safer to leave them at home.  How long does the exam take: Most tests take 30 to 60 minutes.  HOWEVER, IF YOUR DOCTOR PRESCRIBES ANESTHESIA please plan on spending four to five hours in the recovery room.  What should I bring:  Bring a list of your current medicines to your exam (including vitamins, minerals and over-the-counter drugs).  Do I need a :  **If you will be receiving sedation or general anesthesia, please see special notes below.**  What should I do after the exam: No Restrictions, You may resume normal activities.  What is this test: MRI (magnetic resonance imaging) uses a strong magnet and radio waves to look inside the body. An MRA (magnetic resonance angiogram) does the same thing, but it lets us look at your blood vessels. A computer turns the radio waves into pictures showing cross sections of the body, much like slices of bread. This helps us see any problems more clearly. You may receive fluid (called  contrast ) before or during your scan. The fluid helps us see the pictures better. We give the fluid through an IV (small needle in your arm).  Who should I call with questions:  Please call the Imaging Department at your exam site with any questions. Directions, parking instructions, and other information is available on our website,  [email or web address].  How do I prepare if I m having sedation or anesthesia? **IMPORTANT** THE INSTRUCTIONS BELOW ARE ONLY FOR THOSE PATIENTS WHO HAVE BEEN TOLD THEY WILL RECEIVE SEDATION OR GENERAL ANESTHESIA DURING THEIR MRI PROCEDURE:  IF YOU WILL RECEIVE SEDATION (take medicine to help you relax during your exam): You must get the medicine from your doctor before you arrive. Bring the medicine to the exam. Do not take it at home. Arrive one hour early. Bring someone who can take you home after the test. Your medicine will make you sleepy. After the exam, you may not drive, take a bus or take a taxi by yourself. No eating 8 hours before your exam. You may have clear liquids up until 4 hours before your exam. (Clear liquids include water, clear tea, black coffee and fruit juice without pulp.)  IF YOU WILL RECEIVE ANESTHESIA (be asleep for your exam): Arrive 1 1/2 hours early. Bring someone who can take you home after the test. You may not drive, take a bus or take a taxi by yourself. No eating 8 hours before your exam. You may have clear liquids up until 4 hours before your exam. (Clear liquids include water, clear tea, black coffee and fruit juice without pulp.)            Nov 29, 2018 11:30 AM CST   XR LUMBAR PUNCTURE SPINAL TAP DIAGNOSTIC with UCXR3, MARICEL IMAGING NURSE, MARICEL IR [facility] Xray ([facility])    [address] [address] [phone]           How do I prepare for my exam? (Food and drink instructions) No Food and Drink Restrictions.  How do I prepare for my exam? (Other instructions) * If you take Coumadin (or any other blood thinners) you may need to stop taking them up to 5 days prior to the exam. Talk to your doctor before stopping. * If you take Plavix, Ticlid, Pletal or Persantine, please ask your doctor if you should stop these medicines. You may need extra tests on the morning of your scan. * If you take Xarelto  (Rivaroxaban), you may need to stop taking it 24 hours before treatment. Talk to your doctor before stopping this medicine.  What should I wear: Wear comfortable clothes.  How long does the exam take: Injections take about 30 to 45 minutes. Most people spend up to 2 hours in the clinic or hospital.  What should I bring: Please bring any scans or X-rays taken at other hospitals, if similar tests were done. Also bring a list of your medicines, including vitamins, minerals and over-the-counter drugs. It is safest to leave personal items at home.  Do I need a :  Plan to have someone drive you home afterward.  What do I need to tell my doctor: Tell your doctor in advance: * If you are allergic to X-ray dye (contrast fluid). * If you may be pregnant.  What should I do after the exam: You may have slight cramping during this exam. The cramps should go away afterward. You may have some spotting after the exam.  What is this test: A spinal shot is done in or near the spine. You may receive steroid medicine (to reduce swelling) or contrast fluid (dye that makes the area show up more clearly on X-rays). A nerve root shot is a shot into the nerve near the spine. It may reduce inflammation near the nerve root or spinal cord and reduce pain in the arm or leg.  Who should I call with questions: If you have any questions, please call the Imaging Department where you will have your exam. Directions, parking instructions, and other information are available on our website, [email or web address].            Dec 06, 2018  8:30 AM CST   (Arrive by 8:15 AM)   Return Visit with Tian Thapa MD   [facility] Neurology ([facility])    [address] [place] [address] [phone]              Future tests that were ordered for you today     Open Future Orders        Priority Expected Expires Ordered    MRI Lumbar spine w & w/o contrast Routine  11/17/2019 11/17/2018    MRI Brain  w & w/o contrast Routine  2019    X-ray Lumbar puncture spinal tap Routine 2018    B [place] CSF: Tube 3 Routine  2019    Cell count with differential CSF: Tube 2 Routine  2019    Cytology non gyn Routine  2019    Glucose CSF: Tube 3 Routine  2019    Oligoclonal banding Routine  2019    Protein total CSF: Tube 2 Routine  2019    VDRL CSF: Tube 3 Routine  2019    EMG Routine  2019    Erythrocyte sedimentation rate auto Routine  2019    SSA Ro BRITTON Antibody IgG Routine  2019    SSB La BRITTON Antibody IgG Routine  2019            Who to contact     Please call your clinic at [phone] to:    Ask questions about your health    Make or cancel appointments    Discuss your medicines    Learn about your test results    Speak to your doctor            Additional Information About Your Visit        farmhoppinghart Information     [employer] is an electronic gateway that provides easy, online access to your medical records. With [employer], you can request a clinic appointment, read your test results, renew a prescription or communicate with your care team.     To sign up for [employer] visit the website at [email or web address]   You will be asked to enter the access code listed below, as well as some personal information. Please follow the directions to create your username and password.     Your access code is: [email or web address]  Expires: 2019  5:31 AM     Your access code will  in 90 days. If you need help or a new code, please contact your [facility] or call [phone] for assistance.        Care EveryWhere ID     This is your Care EveryWhere ID. This could be used by other organizations to access your [place] medical records  [id number]         Blood  Pressure from Last 3 Encounters:   11/15/18 (!) 150/96    Weight from Last 3 Encounters:   11/15/18 146.1 kg (322 lb 1.6 oz)              We Performed the Following     MAGDA Scrn Rflx to Titer and Ptrn ([facility])     Methylmalonic acid     Protein electrophoresis     Protein Immunofixation Serum     Vitamin B12        Primary Care Provider Office Phone # Fax #    Chandrakant Batista [phone]       [address] [address]        Equal Access to Services     MARY CUBA : Hadii aad ku hadasho Soomaali, waaxda luqadaha, qaybta kaalmada adeegyada, waxay idiin hayaan adeeg umm kimbrough . So [facility] [phone].    ATENCIÓN: Si habla español, tiene a henry disposición servicios gratuitos de asistencia lingüística. [facility] [phone].    We comply with applicable federal civil rights laws and [place] laws. We do not discriminate on the basis of race, color, national origin, age, disability, sex, sexual orientation, or gender identity.            Thank you!     Thank you for choosing [facility] NEUROLOGY  for your care. Our goal is always to provide you with excellent care. Hearing back from our patients is one way we can continue to improve our services. Please take a few minutes to complete the written survey that you may receive in the mail after your visit with us. Thank you!             Your Updated Medication List - Protect others around you: Learn how to safely use, store and throw away your medicines at www.disposemymeds.org.          This list is accurate as of 11/17/18  8:45 AM.  Always use your most recent med list.                   Brand Name Dispense Instructions for use Diagnosis    ALPHAGAN P 0.1 % ophthalmic solution   Generic drug:  brimonidine           dorzolamide-timolol 2-0.5 % ophthalmic solution    COSOPT     INSTILL 1 DROP IN BOTH EYES TWICE A DAY        hydrochlorothiazide 25 MG tablet    HYDRODIURIL     25 mg daily        latanoprost 0.005 %  ophthalmic solution    XALATAN     Inject 1 drop into the eye        levothyroxine 125 MCG tablet    SYNTHROID/LEVOTHROID          lisinopril 40 MG tablet    PRINIVIL/ZESTRIL     40 mg daily

## 2018-11-17 NOTE — LETTER
11/17/2018       RE: Ry Churchill  [address]  [facility] [address]     Dear Colleague,    Thank you for referring your patient, Ry Churchill, to the [facility] NEUROLOGY at [facility]. Please see a copy of my visit note below.    Service Date: 11/17/2018      INTERVAL HISTORY:  This patient is seen in followup with 4 extremity weakness.  I initially saw him 2 days ago.  He is here with his daughters, Brissa and Leslie and his wife, Teri.  When I saw him on Thursday he had quadriparesis.  He had had lumbar spine surgery.  I obtained an MRI of the cervical and thoracic spine.  These were done yesterday.  I have reviewed the images with him.  The official reports are not available.  He has multilevel degenerative change, but there is no obvious compromise of the spinal cord and no evidence of an intrinsic cord lesion.      PHYSICAL EXAMINATION:   NEUROLOGIC:  The patient continues to demonstrate weakness.  In the arms, he has grade 4 weakness of finger extension and elbow extension on the left.  Strength in other muscles is normal and these muscles are stronger on the right.  In the legs, he is barely antigravity in left hip flexion and knee extension.  He is grade 3+ and ankle dorsiflexion and toe dorsiflexion.  He has little or no movement of the right foot at the ankle.  He cannot raise the right leg antigravity, either at the hip or the knee.  Muscle stretch reflexes are trace at the biceps, triceps, brachioradialis and knees and absent at the ankles.  Left toe continues to be upgoing.  Sensory exam shows preserved vibration in the toes.  Light touch sensation is preserved in the feet.  No fasciculations are noted in the arms or legs, but he is a large man.      IMPRESSION:   Quadriparesis.      DISCUSSION:  This patient is seen in followup with ongoing issues of 4 extremity weakness.  The symptoms began in the right leg with a painless foot drop, but now have  progressed.  Imaging of the cervical and thoracic spine does not show an obvious explanation.  The differential diagnosis is chronic inflammatory neuropathy or motor neuron disease.  For evaluation at this point I am going to obtain labs for neuropathy including sedimentation rate, MAGDA, Sjogren antibodies, protein electrophoresis, immune fixation, vitamin B12 level.  He is on thyroid replacement.  He does have a history of glucose intolerance.  I am also going to schedule him for spinal fluid analysis.  Electrodiagnostic testing will be arranged with Dr. Collins.  I am also going to obtain MRI brain and lumbar spine to make sure there is no central demyelination, though unlikely.  I will be seeing him in followup in the next few weeks.      This was a 30-minute appointment, more than half of which was spent in counseling and coordination of care.      Tian Thaap MD         D: 2018   T: 2018   MT: AKA      Name:     LAURA POWERS   MRN:      [id number]        Account:      [id number]   :      1955           Service Date: 2018      Document: [id number]

## 2018-11-17 NOTE — NURSING NOTE
Chief Complaint   Patient presents with     RECHECK     [facility] RETURN PATIENT VISIT FOR F/U AFTER MRI     Patient arrived a little less than one hour prior to appt. Provider was here and grabbed him from lobby for the visit. Provider informed me and did not need vitals on patient.  Esau Laureano MA

## 2018-11-17 NOTE — PROGRESS NOTES
Service Date: 11/17/2018      INTERVAL HISTORY:  This patient is seen in followup with 4 extremity weakness.  I initially saw him 2 days ago.  He is here with his daughters, Brissa and Leslie and his wife, Teri.  When I saw him on Thursday he had quadriparesis.  He had had lumbar spine surgery.  I obtained an MRI of the cervical and thoracic spine.  These were done yesterday.  I have reviewed the images with him.  The official reports are not available.  He has multilevel degenerative change, but there is no obvious compromise of the spinal cord and no evidence of an intrinsic cord lesion.      PHYSICAL EXAMINATION:   NEUROLOGIC:  The patient continues to demonstrate weakness.  In the arms, he has grade 4 weakness of finger extension and elbow extension on the left.  Strength in other muscles is normal and these muscles are stronger on the right.  In the legs, he is barely antigravity in left hip flexion and knee extension.  He is grade 3+ and ankle dorsiflexion and toe dorsiflexion.  He has little or no movement of the right foot at the ankle.  He cannot raise the right leg antigravity, either at the hip or the knee.  Muscle stretch reflexes are trace at the biceps, triceps, brachioradialis and knees and absent at the ankles.  Left toe continues to be upgoing.  Sensory exam shows preserved vibration in the toes.  Light touch sensation is preserved in the feet.  No fasciculations are noted in the arms or legs, but he is a large man.      IMPRESSION:   Quadriparesis.      DISCUSSION:  This patient is seen in followup with ongoing issues of 4 extremity weakness.  The symptoms began in the right leg with a painless foot drop, but now have progressed.  Imaging of the cervical and thoracic spine does not show an obvious explanation.  The differential diagnosis is chronic inflammatory neuropathy or motor neuron disease.  For evaluation at this point I am going to obtain labs for neuropathy including sedimentation rate,  MAGDA, Sjogren antibodies, protein electrophoresis, immune fixation, vitamin B12 level.  He is on thyroid replacement.  He does have a history of glucose intolerance.  I am also going to schedule him for spinal fluid analysis.  Electrodiagnostic testing will be arranged with Dr. Collins.  I am also going to obtain MRI brain and lumbar spine to make sure there is no central demyelination, though unlikely.  I will be seeing him in followup in the next few weeks.      This was a 30-minute appointment, more than half of which was spent in counseling and coordination of care.      MD ZORAIDA Tran MD             D: 2018   T: 2018   MT: AKA      Name:     LAURA POWERS   MRN:      [id number]        Account:      [id number]   :      1955           Service Date: 2018      Document: [id number]

## 2018-11-20 NOTE — PROGRESS NOTES
Reviewed labs with pt and wife.  B12, sed rate, sjogren jon normal/negative.  MAGDA borderline positive.  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  Will refer to hematology.  EMG tomorrow.

## 2018-11-21 NOTE — LETTER
2018       RE: Ry Churchill  [address]  [facility] [address]     Dear Colleague,    Thank you for referring your patient, Ry Churchill, to the [facility] EMG at [facility]. Please see a copy of my visit note below.      [facility]  Electrodiagnostic Laboratory    Nerve Conduction & EMG Report          Patient:       Ry Churchill  Patient ID:    [id number]  Gender:        Male  YOB: 1955  Age:           63 Years 9 Months        History & Examination:  63 year old man with 4 limb asymmetric weakness evolving over the last year. Genertaly no pain, and other than some right foot paresthesias no numbness. Eval for inflammatory neuropathy vs motor neuron disease.     Techniques: Motor and sensory conduction studies were done with surface recording electrodes. EMG was done with a concentric needle electrode.      Results:  Nerve conduction studies:   1. Bilateral sural and superficial peroneal sensory responses show normal amplitudes and borderline slow CVs.   2. Right median-D2 and ulnar-D5 sensory responses show normal amplitudes and mildly slowed CVs.   3. Right radial sensory response is normal.   4. Bilateral peroneal-EDB and right peroneal-TA motor responses are absent.  5. Right tibial-AH motor response shows mildly prolonged DL and severely reduced amplitude.   6. Left tibial-AH motor response shows normal DL and severely reduced amplitude.   7. Left peroneal-TA motor response shows severely reduced amplitude and normal CV.   8. Right median-APB motor response shows mildly prolonged DL, normal amplitude and normal CV in the forearm.   9. Right ulnar-ADM motor response shows normal DL, normal amplitude, and moderate to severe CV slowing across the elbow.     Needle EM. Fibrillation potentials and positive sharp waves were seen in all of the sampled muscles as tabulated below.   2. Fasciculation potentials were seen in  the right biceps, PT, FDI, thoracic PS, and TA muscles.   3. No MUPs were seen in the right TA and gastrocnemius muscles.   4. Large amplitude and/or long duration motor unit potentials (MUP) were seen in the right deltoid, right biceps, right triceps, right PT, right FDI, right VL, left VL, and left gastrocnemius muscles.     Interpretation:  This is an abnormal study. 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 In addition, there is evidence of a superimposed mild to moderate right-sided median neuropathy at the wrist and a superimposed mild to moderate right-sided ulnar neuropathy at the elbow. Note is additionally made of very modest distal slowing of several sensory responses. These findings are unlikely to have clinical significance. There is no evidence of a generalized demyelinating polyneuropathy on the basis of this study, as clinically queried. Clinical correlation is recommended.        Benjamin Collins MD  Department of Neurology        Sensory NCS      Nerve / Sites Rec. Site Onset Peak Ref. NP Amp Ref. PP Amp Dist Russ Ref. Temp     ms ms ms  V  V  V cm m/s m/s  C   R MEDIAN - Dig II Anti      Wrist Dig II 3.07 4.11  17.1 10.0 25.6 14 45.6 48.0 31.6   R ULNAR - Dig V Anti      Wrist Dig V 2.76 3.59  9.6 8.0 15.0 12.5 45.3 48.0 31.2   R RADIAL - Snuff      Forearm Snuff 1.82 2.34  17.0 15.0 27.3 10 54.9 48.0 32.4   R SURAL - Lat Mall 60      Calf Ankle 3.75 5.42  5.6 5.0 0.57 14 37.3  29   L SURAL - Lat Mall 60      Calf Ankle 3.65 4.53  5.6 5.0  14 38.4 38.0 29.2   L SUP PERONEAL      Lat Leg Frausto 3.18 4.58  4.0  3.4 12.5 39.3 38.0 29.1   R SUP PERONEAL      Lat Leg Frausto 3.54 5.16  4.3  3.0 12.5 35.3 38.0 29.6       Motor NCS      Nerve / Sites Rec. Site Lat Ref. Amp Ref. Rel Amp Dist Russ Ref. Dur. Area Temp.     ms ms mV mV % cm m/s m/s ms %  C   R MEDIAN - APB      Wrist APB 5.52 4.40 6.4 5.0 100 8    7.55 100 32.4      Elbow APB 10.52  6.0  93.7 24 48.0 48.0 8.02 92.6 32.4   R ULNAR - ADM      Wrist ADM 3.44 3.50 7.9 5.0 100 8   6.67 100 31.5      B.Elbow ADM 8.23  7.7  97 23 48.0 48.0 7.55 94.1 31.3      A.Elbow ADM 10.73  7.4  93.5 9 36.0 48.0 7.60 97.1 31.3   R DEEP PERONEAL - EDB 60      Ankle EDB NR 6.00 NR 2.0 NR 8   NR NR 29.9   L DEEP PERONEAL - EDB 60      Ankle EDB NR 6.00 NR 2.0 NR 8   NR NR 29.1   R TIBIAL - AH      Ankle AH 7.19 6.00 0.1 4.0 100 8   6.46  29.2   L TIBIAL - AH      Ankle AH 4.53 6.00 0.7 4.0 100 8   5.78 100 29      Pop Fos AH NR  NR  NR   38.0 NR NR 29   R PERONEAL - Tib Ant      Fib Head Tib Ant NR  NR  NR    NR NR 29.1   L PERONEAL - Tib Ant      Fib Head Tib Ant 3.70  0.9  100 8   8.80 100 29.1      Knee Tib Ant 6.20  1.0  105 11 44.0  10.21 106 29.1       F  Wave      Nerve Min F Lat Max F Lat Mean FLat Temp.    ms ms ms  C   R MEDIAN 31.35 40.83 35.46 31.9   R ULNAR 34.58 53.28 39.28 31.2       EMG Summary Table     Spontaneous MUAP Recruitment    IA Fib/PSW Fasc H.F. Amp Dur. PPP Pattern   R. DELTOID Increased 2+ None None 1+ 2+ 2+ Moderately Reduced   R. BICEPS Increased 2+ 2+ None 2+ N N Moderately Reduced   R. TRICEPS Increased 2+ None None 2+ 1+ 1+ Moderately Reduced   R. PRON TERES Increased 2+ 2+ None 2+ 1+ 1+ Moderately Reduced   R. FIRST D INTEROSS Increased 2+ 1+ None 2+ 2+ 1+ Moderately Reduced   R. THOR PSP (M) Increased 2+ 1+ None       R. VAST LATERALIS Increased 3+ None None Giant 1+ N Severly reduced   R. TIB ANTERIOR Increased 3+ 1+ None    No Activity   R. GASTROCN (MED) Increased 3+ None None    No Activity   L. VAST LATERALIS Increased 3+ None None Giant 2+ 1+ Severly reduced   L. GASTROCN (MED) Increased 3+ None None 2+ 2+ 2+ Moderately Reduced                                                Again, thank you for allowing me to participate in the care of your patient.      Sincerely,    Benjamin Collins MD

## 2018-11-21 NOTE — MR AVS SNAPSHOT
After Visit Summary   11/21/2018    Ry Churchill    MRN: [id number]           Patient Information     Date Of Birth          1955        Visit Information        Provider Department      11/21/2018 2:15 PM Benjamin Collins MD [facility] EMG        Today's Diagnoses     MND (motor neurone disease) (H)    -  1    Neuropathy        Carpal tunnel syndrome of right wrist           Follow-ups after your visit        Your next 10 appointments already scheduled     Nov 23, 2018  3:00 PM CST   MR LUMBAR SPINE W/O & W CONTRAST with URMR1   [facility] ([facility])    [facility] [address] [phone]           How do I prepare for my exam? (Food and drink instructions) **If you will be receiving sedation or general anesthesia, please see special notes below.**  How do I prepare for my exam? (Other instructions) Take your medicines as usual, unless your doctor tells you not to. You may or may not receive intravenous (IV) contrast for this exam pending the discretion of the Radiologist.  You do not need to do anything special to prepare.  **If you will be receiving sedation or general anesthesia, please see special notes below.**  What should I wear: The MRI machine uses a strong magnet. Please wear clothes without metal (snaps, zippers). A sweatsuit works well, or we may give you a hospital gown. Please remove any body piercings and hair extensions before you arrive. You will also remove watches, jewelry, hairpins, wallets, dentures, partial dental plates and hearing aids. You may wear contact lenses, and you may be able to wear your rings. We have a safe place to keep your personal items, but it is safer to leave them at home.  How long does the exam take: Most tests take 30 to 60 minutes.  HOWEVER, IF YOUR DOCTOR PRESCRIBES ANESTHESIA please plan on spending four to five hours in the recovery room.  What should I  bring:  Bring a list of your current medicines to your exam (including vitamins, minerals and over-the-counter drugs).  Do I need a :  **If you will be receiving sedation or general anesthesia, please see special notes below.**  What should I do after the exam: No Restrictions, You may resume normal activities.  What is this test: MRI (magnetic resonance imaging) uses a strong magnet and radio waves to look inside the body. An MRA (magnetic resonance angiogram) does the same thing, but it lets us look at your blood vessels. A computer turns the radio waves into pictures showing cross sections of the body, much like slices of bread. This helps us see any problems more clearly. You may receive fluid (called  contrast ) before or during your scan. The fluid helps us see the pictures better. We give the fluid through an IV (small needle in your arm).  Who should I call with questions:  Please call the Imaging Department at your exam site with any questions. Directions, parking instructions, and other information is available on our website, [email or web address].  How do I prepare if I m having sedation or anesthesia? **IMPORTANT** THE INSTRUCTIONS BELOW ARE ONLY FOR THOSE PATIENTS WHO HAVE BEEN TOLD THEY WILL RECEIVE SEDATION OR GENERAL ANESTHESIA DURING THEIR MRI PROCEDURE:  IF YOU WILL RECEIVE SEDATION (take medicine to help you relax during your exam): You must get the medicine from your doctor before you arrive. Bring the medicine to the exam. Do not take it at home. Arrive one hour early. Bring someone who can take you home after the test. Your medicine will make you sleepy. After the exam, you may not drive, take a bus or take a taxi by yourself. No eating 8 hours before your exam. You may have clear liquids up until 4 hours before your exam. (Clear liquids include water, clear tea, black coffee and fruit juice without pulp.)  IF YOU WILL RECEIVE ANESTHESIA (be asleep for your exam): Arrive 1 1/2 hours  early. Bring someone who can take you home after the test. You may not drive, take a bus or take a taxi by yourself. No eating 8 hours before your exam. You may have clear liquids up until 4 hours before your exam. (Clear liquids include water, clear tea, black coffee and fruit juice without pulp.)            Nov 23, 2018  3:45 PM CST   MR BRAIN W/O & W CONTRAST with URMR1   [facility] [place] ([facility])    [address] [place] [address] [phone]           How do I prepare for my exam? (Food and drink instructions) **If you will be receiving sedation or general anesthesia, please see special notes below.**  How do I prepare for my exam? (Other instructions) Take your medicines as usual, unless your doctor tells you not to. You may or may not receive intravenous (IV) contrast for this exam pending the discretion of the Radiologist.  You do not need to do anything special to prepare.  **If you will be receiving sedation or general anesthesia, please see special notes below.**  What should I wear: The MRI machine uses a strong magnet. Please wear clothes without metal (snaps, zippers). A sweatsuit works well, or we may give you a hospital gown. Please remove any body piercings and hair extensions before you arrive. You will also remove watches, jewelry, hairpins, wallets, dentures, partial dental plates and hearing aids. You may wear contact lenses, and you may be able to wear your rings. We have a safe place to keep your personal items, but it is safer to leave them at home.  How long does the exam take: Most tests take 30 to 60 minutes.  HOWEVER, IF YOUR DOCTOR PRESCRIBES ANESTHESIA please plan on spending four to five hours in the recovery room.  What should I bring:  Bring a list of your current medicines to your exam (including vitamins, minerals and over-the-counter drugs).  Do I need a :  **If you will be receiving sedation or general  anesthesia, please see special notes below.**  What should I do after the exam: No Restrictions, You may resume normal activities.  What is this test: MRI (magnetic resonance imaging) uses a strong magnet and radio waves to look inside the body. An MRA (magnetic resonance angiogram) does the same thing, but it lets us look at your blood vessels. A computer turns the radio waves into pictures showing cross sections of the body, much like slices of bread. This helps us see any problems more clearly. You may receive fluid (called  contrast ) before or during your scan. The fluid helps us see the pictures better. We give the fluid through an IV (small needle in your arm).  Who should I call with questions:  Please call the Imaging Department at your exam site with any questions. Directions, parking instructions, and other information is available on our website, [email or web address].  How do I prepare if I m having sedation or anesthesia? **IMPORTANT** THE INSTRUCTIONS BELOW ARE ONLY FOR THOSE PATIENTS WHO HAVE BEEN TOLD THEY WILL RECEIVE SEDATION OR GENERAL ANESTHESIA DURING THEIR MRI PROCEDURE:  IF YOU WILL RECEIVE SEDATION (take medicine to help you relax during your exam): You must get the medicine from your doctor before you arrive. Bring the medicine to the exam. Do not take it at home. Arrive one hour early. Bring someone who can take you home after the test. Your medicine will make you sleepy. After the exam, you may not drive, take a bus or take a taxi by yourself. No eating 8 hours before your exam. You may have clear liquids up until 4 hours before your exam. (Clear liquids include water, clear tea, black coffee and fruit juice without pulp.)  IF YOU WILL RECEIVE ANESTHESIA (be asleep for your exam): Arrive 1 1/2 hours early. Bring someone who can take you home after the test. You may not drive, take a bus or take a taxi by yourself. No eating 8 hours before your exam. You may have clear liquids up until 4  hours before your exam. (Clear liquids include water, clear tea, black coffee and fruit juice without pulp.)            Nov 29, 2018 11:00 AM CST   LAB with  LAB   [facility] ([facility])    [address] [place] [id number] [phone]           Please do not eat 10-12 hours before your appointment if you are coming in fasting for labs on lipids, cholesterol, or glucose (sugar). This does not apply to pregnant women. Water, hot tea and black coffee (with nothing added) are okay. Do not drink other fluids, diet soda or chew gum.            Nov 29, 2018 11:30 AM CST   XR LUMBAR PUNCTURE SPINAL TAP DIAGNOSTIC with MARICELXR3,  IMAGING NURSE,  IR RAD   [facility] ([facility])    [address] [place] [id number] [phone]           How do I prepare for my exam? (Food and drink instructions) No Food and Drink Restrictions.  How do I prepare for my exam? (Other instructions) * If you take Coumadin (or any other blood thinners) you may need to stop taking them up to 5 days prior to the exam. Talk to your doctor before stopping. * If you take Plavix, Ticlid, Pletal or Persantine, please ask your doctor if you should stop these medicines. You may need extra tests on the morning of your scan. * If you take Xarelto (Rivaroxaban), you may need to stop taking it 24 hours before treatment. Talk to your doctor before stopping this medicine.  What should I wear: Wear comfortable clothes.  How long does the exam take: Injections take about 30 to 45 minutes. Most people spend up to 2 hours in the clinic or hospital.  What should I bring: Please bring any scans or X-rays taken at other hospitals, if similar tests were done. Also bring a list of your medicines, including vitamins, minerals and over-the-counter drugs. It is safest to leave personal items at home.  Do I need a :  Plan to have someone  drive you home afterward.  What do I need to tell my doctor: Tell your doctor in advance: * If you are allergic to X-ray dye (contrast fluid). * If you may be pregnant.  What should I do after the exam: You may have slight cramping during this exam. The cramps should go away afterward. You may have some spotting after the exam.  What is this test: A spinal shot is done in or near the spine. You may receive steroid medicine (to reduce swelling) or contrast fluid (dye that makes the area show up more clearly on X-rays). A nerve root shot is a shot into the nerve near the spine. It may reduce inflammation near the nerve root or spinal cord and reduce pain in the arm or leg.  Who should I call with questions: If you have any questions, please call the Imaging Department where you will have your exam. Directions, parking instructions, and other information are available on our website, [email or web address].            Dec 06, 2018  8:30 AM CST   (Arrive by 8:15 AM)   Return Visit with Tian Thapa MD   [facility] ([facility])    [address] [place] [address]  3rd Floor  [facility] [address] [phone]            Dec 07, 2018 12:00 PM CST   (Arrive by 11:45 AM)   New Patient Visit with Alexander Alicia MD   [facility] ([facility])    [address] [place] [address]  [address]  [facility] [address] [phone]              Who to contact     Please call your clinic at [phone] to:    Ask questions about your health    Make or cancel appointments    Discuss your medicines    Learn about your test results    Speak to your doctor            Additional Information About Your Visit        [employer] Information     Vertra is an electronic gateway that provides easy, online access to your medical records. With Vertra, you can request a clinic appointment, read your test results, renew a prescription or communicate with your care team.     To sign  up for [employer] visit the website at [email or web address]   You will be asked to enter the access code listed below, as well as some personal information. Please follow the directions to create your username and password.     Your access code is: [email or web address]  Expires: 2/15/2019  8:49 AM     Your access code will  in 90 days. If you need help or a new code, please contact your [facility] or call [phone] for assistance.        Care EveryWhere ID     This is your Care EveryWhere ID. This could be used by other organizations to access your [place] medical records  [id number]         Blood Pressure from Last 3 Encounters:   11/15/18 (!) 150/96    Weight from Last 3 Encounters:   11/15/18 146.1 kg (322 lb 1.6 oz)              We Performed the Following     EMG Thoracic Paraspinal Muscles ([address])     EMG     HC NCS MOTOR W OR W/O F-WAVE, 13 OR MORE     HC NEEDLE EMG EA EXTREMITY W/PARASPINAL AREA LIMITED     HC NEEDLE EMG EA EXTREMTY W/PARASPINAL AREA COMPLETE        Primary Care Provider Office Phone # Fax #    Chandrakant Renny Batista [phone]       [address] [address]        Equal Access to Services     MARY CUBA : Hadii [place] ku hadasho Soomaali, waaxda luqadaha, qaybta kaalmada adeegyada, waxay idiin haymaryanan omar [place]. So [facility] [phone].    ATENCIÓN: Si habla español, tiene a henry disposición servicios gratuitos de asistencia lingüística. [facility] [phone].    We comply with applicable federal civil rights laws and [place] laws. We do not discriminate on the basis of race, color, national origin, age, disability, sex, sexual orientation, or gender identity.            Thank you!     Thank you for choosing [facility]  for your care. Our goal is always to provide you with excellent care. Hearing back from our patients is one way we can continue to improve our services. Please take a few minutes  to complete the written survey that you may receive in the mail after your visit with us. Thank you!             Your Updated Medication List - Protect others around you: Learn how to safely use, store and throw away your medicines at www.disposemymeds.org.          This list is accurate as of 11/21/18  4:50 PM.  Always use your most recent med list.                   Brand Name Dispense Instructions for use Diagnosis    ALPHAGAN P 0.1 % ophthalmic solution   Generic drug:  brimonidine           dorzolamide-timolol 2-0.5 % ophthalmic solution    COSOPT     INSTILL 1 DROP IN BOTH EYES TWICE A DAY        hydrochlorothiazide 25 MG tablet    HYDRODIURIL     25 mg daily        latanoprost 0.005 % ophthalmic solution    XALATAN     Inject 1 drop into the eye        levothyroxine 125 MCG tablet    SYNTHROID/LEVOTHROID          lisinopril 40 MG tablet    PRINIVIL/ZESTRIL     40 mg daily

## 2018-11-21 NOTE — PROGRESS NOTES
[facility]  Electrodiagnostic Laboratory    Nerve Conduction & EMG Report          Patient:       Ry Churchill  Patient ID:    [id number]  Gender:        Male  YOB: 1955  Age:           63 Years 9 Months        History & Examination:  63 year old man with 4 limb asymmetric weakness evolving over the last year. Genertaly no pain, and other than some right foot paresthesias no numbness. Eval for inflammatory neuropathy vs motor neuron disease.     Techniques: Motor and sensory conduction studies were done with surface recording electrodes. EMG was done with a concentric needle electrode.      Results:  Nerve conduction studies:   1. Bilateral sural and superficial peroneal sensory responses show normal amplitudes and borderline slow CVs.   2. Right median-D2 and ulnar-D5 sensory responses show normal amplitudes and mildly slowed CVs.   3. Right radial sensory response is normal.   4. Bilateral peroneal-EDB and right peroneal-TA motor responses are absent.  5. Right tibial-AH motor response shows mildly prolonged DL and severely reduced amplitude.   6. Left tibial-AH motor response shows normal DL and severely reduced amplitude.   7. Left peroneal-TA motor response shows severely reduced amplitude and normal CV.   8. Right median-APB motor response shows mildly prolonged DL, normal amplitude and normal CV in the forearm.   9. Right ulnar-ADM motor response shows normal DL, normal amplitude, and moderate to severe CV slowing across the elbow.     Needle EM. Fibrillation potentials and positive sharp waves were seen in all of the sampled muscles as tabulated below.   2. Fasciculation potentials were seen in the right biceps, PT, FDI, thoracic PS, and TA muscles.   3. No MUPs were seen in the right TA and gastrocnemius muscles.   4. Large amplitude and/or long duration motor unit potentials (MUP) were seen in the right deltoid, right biceps, right triceps, right PT, right FDI, right  VL, left VL, and left gastrocnemius muscles.     Interpretation:  This is an abnormal study. 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 In addition, there is evidence of a superimposed mild to moderate right-sided median neuropathy at the wrist and a superimposed mild to moderate right-sided ulnar neuropathy at the elbow. Note is additionally made of very modest distal slowing of several sensory responses. These findings are unlikely to have clinical significance. There is no evidence of a generalized demyelinating polyneuropathy on the basis of this study, as clinically queried. Clinical correlation is recommended.        Benjamin Collins MD  Department of Neurology        Sensory NCS      Nerve / Sites Rec. Site Onset Peak Ref. NP Amp Ref. PP Amp Dist Russ Ref. Temp     ms ms ms  V  V  V cm m/s m/s  C   R MEDIAN - Dig II Anti      Wrist Dig II 3.07 4.11  17.1 10.0 25.6 14 45.6 48.0 31.6   R ULNAR - Dig V Anti      Wrist Dig V 2.76 3.59  9.6 8.0 15.0 12.5 45.3 48.0 31.2   R RADIAL - Snuff      Forearm Snuff 1.82 2.34  17.0 15.0 27.3 10 54.9 48.0 32.4   R SURAL - Lat Mall 60      Calf Ankle 3.75 5.42  5.6 5.0 0.57 14 37.3  29   L SURAL - Lat Mall 60      Calf Ankle 3.65 4.53  5.6 5.0  14 38.4 38.0 29.2   L SUP PERONEAL      Lat Leg Frausto 3.18 4.58  4.0  3.4 12.5 39.3 38.0 29.1   R SUP PERONEAL      Lat Leg Frausto 3.54 5.16  4.3  3.0 12.5 35.3 38.0 29.6       Motor NCS      Nerve / Sites Rec. Site Lat Ref. Amp Ref. Rel Amp Dist Russ Ref. Dur. Area Temp.     ms ms mV mV % cm m/s m/s ms %  C   R MEDIAN - APB      Wrist APB 5.52 4.40 6.4 5.0 100 8   7.55 100 32.4      Elbow APB 10.52  6.0  93.7 24 48.0 48.0 8.02 92.6 32.4   R ULNAR - ADM      Wrist ADM 3.44 3.50 7.9 5.0 100 8   6.67 100 31.5      B.Elbow ADM 8.23  7.7  97 23 48.0 48.0 7.55 94.1 31.3      A.Elbow ADM 10.73  7.4  93.5 9 36.0 48.0 7.60 97.1 31.3   R DEEP PERONEAL  - EDB 60      Ankle EDB NR 6.00 NR 2.0 NR 8   NR NR 29.9   L DEEP PERONEAL - EDB 60      Ankle EDB NR 6.00 NR 2.0 NR 8   NR NR 29.1   R TIBIAL - AH      Ankle AH 7.19 6.00 0.1 4.0 100 8   6.46  29.2   L TIBIAL - AH      Ankle AH 4.53 6.00 0.7 4.0 100 8   5.78 100 29      Pop Fos AH NR  NR  NR   38.0 NR NR 29   R PERONEAL - Tib Ant      Fib Head Tib Ant NR  NR  NR    NR NR 29.1   L PERONEAL - Tib Ant      Fib Head Tib Ant 3.70  0.9  100 8   8.80 100 29.1      Knee Tib Ant 6.20  1.0  105 11 44.0  10.21 106 29.1       F  Wave      Nerve Min F Lat Max F Lat Mean FLat Temp.    ms ms ms  C   R MEDIAN 31.35 40.83 35.46 31.9   R ULNAR 34.58 53.28 39.28 31.2       EMG Summary Table     Spontaneous MUAP Recruitment    IA Fib/PSW Fasc H.F. Amp Dur. PPP Pattern   R. DELTOID Increased 2+ None None 1+ 2+ 2+ Moderately Reduced   R. BICEPS Increased 2+ 2+ None 2+ N N Moderately Reduced   R. TRICEPS Increased 2+ None None 2+ 1+ 1+ Moderately Reduced   R. PRON TERES Increased 2+ 2+ None 2+ 1+ 1+ Moderately Reduced   R. FIRST D INTEROSS Increased 2+ 1+ None 2+ 2+ 1+ Moderately Reduced   R. THOR PSP (M) Increased 2+ 1+ None       R. VAST LATERALIS Increased 3+ None None Giant 1+ N Severly reduced   R. TIB ANTERIOR Increased 3+ 1+ None    No Activity   R. GASTROCN (MED) Increased 3+ None None    No Activity   L. VAST LATERALIS Increased 3+ None None Giant 2+ 1+ Severly reduced   L. GASTROCN (MED) Increased 3+ None None 2+ 2+ 2+ Moderately Reduced

## 2018-11-27 NOTE — TELEPHONE ENCOUNTER
[facility] Call Center    Phone Message    May a detailed message be left on voicemail: yes    Reason for Call: Other: Pt had MRI on lumbar spine on 11/23 and incidental finding occured, please review and call with questions     Action Taken: Message routed to:  [facility] ([facility] Neurology Clinic

## 2018-11-28 NOTE — TELEPHONE ENCOUNTER
Discussed MRI studies and EMG with wife Teri.  Brain MRI is normal.  Lumbar shows multiple issues, but pt has had low back surgery.  EMG shows widespread denervation, but no evidence of inflammatory neuropathy.  Has LP sched for tomorrow.    MRI of lumbar did raise issue of adrenal nodule.  Will forward report to primary care in [place] to address this issue. I see in f/u next week.

## 2018-11-29 NOTE — DISCHARGE INSTRUCTIONS
[facility]                                        Radiology Discharge instructions Post Myelogram/LumbarPuncture/Blood Patch         AFTER YOU GO HOME      Relax and take it easy for 24 hours    You may resume normal activity tomorrow    You may remove the bandage on your back in the evening or next morning    You may resume bathing the next day    Drink at least 4 glasses of extra fluid today if not on a fluid restriction    DO NOT drive or operate machinery at home or at work for at least 24 hours    If you develop a headache you can take over the counter medicines and lay down.  You can try drinking caffeine-coffee, soda.                   CALL YOU PRIMARY PHYSICIAN IF:    If you start to leak a large amount of fluid from the puncture site, lie down flat on your back.     You develop a severe headache    You develop nausea or vomiting     You develop a temperature of 101 degrees or greater

## 2018-11-29 NOTE — MR AVS SNAPSHOT
MRN:[id number]                      After Visit Summary   11/29/2018    Ry Churchill    MRN: [id number]           Visit Information        Provider Department      11/29/2018 11:30 AM  IR RAD;  IMAGING NURSE; UCXR3 [facility]           Review of your medicines          These changes are accurate as of 11/29/18 10:59 AM.  If you have any questions, ask your nurse or doctor.               CONTINUE these medicines which have NOT CHANGED        Dose / Directions    ALPHAGAN P 0.1 % ophthalmic solution   Generic drug:  brimonidine        Refills:  0       dorzolamide-timolol 2-0.5 % ophthalmic solution   Commonly known as:  COSOPT        INSTILL 1 DROP IN BOTH EYES TWICE A DAY   Refills:  4       hydrochlorothiazide 25 MG tablet   Commonly known as:  HYDRODIURIL        Dose:  25 mg   25 mg daily   Refills:  0       latanoprost 0.005 % ophthalmic solution   Commonly known as:  XALATAN        Dose:  1 drop   Inject 1 drop into the eye   Refills:  0       levothyroxine 125 MCG tablet   Commonly known as:  SYNTHROID/LEVOTHROID        Refills:  0       lisinopril 40 MG tablet   Commonly known as:  PRINIVIL/ZESTRIL        Dose:  40 mg   40 mg daily   Refills:  0                Protect others around you: Learn how to safely use, store and throw away your medicines at www.disposemymeds.org.         Follow-ups after your visit        Your next 10 appointments already scheduled     Nov 29, 2018 11:00 AM CST   LAB with  LAB    Health Lab ([facility])    [address] [place] [address] [phone]           Please do not eat 10-12 hours before your appointment if you are coming in fasting for labs on lipids, cholesterol, or glucose (sugar). This does not apply to pregnant women. Water, hot tea and black coffee (with nothing added) are okay. Do not drink other fluids, diet soda or chew gum.            Nov 29, 2018 11:30 AM CST    XR LUMBAR PUNCTURE SPINAL TAP DIAGNOSTIC with MARICELXR3,  IMAGING NURSE, MARICEL IR RAD   [facility] ([facility])    [address]  1st Floor  [facility] [address] [phone]           How do I prepare for my exam? (Food and drink instructions) No Food and Drink Restrictions.  How do I prepare for my exam? (Other instructions) * If you take Coumadin (or any other blood thinners) you may need to stop taking them up to 5 days prior to the exam. Talk to your doctor before stopping. * If you take Plavix, Ticlid, Pletal or Persantine, please ask your doctor if you should stop these medicines. You may need extra tests on the morning of your scan. * If you take Xarelto (Rivaroxaban), you may need to stop taking it 24 hours before treatment. Talk to your doctor before stopping this medicine.  What should I wear: Wear comfortable clothes.  How long does the exam take: Injections take about 30 to 45 minutes. Most people spend up to 2 hours in the clinic or hospital.  What should I bring: Please bring any scans or X-rays taken at other hospitals, if similar tests were done. Also bring a list of your medicines, including vitamins, minerals and over-the-counter drugs. It is safest to leave personal items at home.  Do I need a :  Plan to have someone drive you home afterward.  What do I need to tell my doctor: Tell your doctor in advance: * If you are allergic to X-ray dye (contrast fluid). * If you may be pregnant.  What should I do after the exam: You may have slight cramping during this exam. The cramps should go away afterward. You may have some spotting after the exam.  What is this test: A spinal shot is done in or near the spine. You may receive steroid medicine (to reduce swelling) or contrast fluid (dye that makes the area show up more clearly on X-rays). A nerve root shot is a shot into the nerve near the spine. It may reduce inflammation near the nerve root or  spinal cord and reduce pain in the arm or leg.  Who should I call with questions: If you have any questions, please call the Imaging Department where you will have your exam. Directions, parking instructions, and other information are available on our website, [email or web address].            Dec 06, 2018  8:30 AM CST   (Arrive by 8:15 AM)   Return Visit with Tian Thapa MD   [facility] ([facility])    [address]  3rd Floor  [place] [facility] [id number] [phone]            Dec 07, 2018 12:00 PM CST   (Arrive by 11:45 AM)   New Patient Visit with Alexander Alicia MD   [facility] ([facility])    [address]  [address]  [place] [id number] [phone]               Care Instructions        Further instructions from your care team       [facility]                                        Radiology Discharge instructions Post Myelogram/LumbarPuncture/Blood Patch         AFTER YOU GO HOME      Relax and take it easy for 24 hours    You may resume normal activity tomorrow    You may remove the bandage on your back in the evening or next morning    You may resume bathing the next day    Drink at least 4 glasses of extra fluid today if not on a fluid restriction    DO NOT drive or operate machinery at home or at work for at least 24 hours    If you develop a headache you can take over the counter medicines and lay down.  You can try drinking caffeine-coffee, soda.                   CALL YOU PRIMARY PHYSICIAN IF:    If you start to leak a large amount of fluid from the puncture site, lie down flat on your back.     You develop a severe headache    You develop nausea or vomiting     You develop a temperature of 101 degrees or greater     Additional Information About Your Visit        [employer] Information     [facility] gives you secure access to your electronic health record. If you see a primary care provider, you can  also send messages to your care team and make appointments. If you have questions, please call your primary care clinic.  If you do not have a primary care provider, please call [phone] and they will assist you.      [employer] is an electronic gateway that provides easy, online access to your medical records. With [employer], you can request a clinic appointment, read your test results, renew a prescription or communicate with your care team.     To access your existing account, please contact your [facility] or call [phone] for assistance.        Care EveryWhere ID     This is your Care EveryWhere ID. This could be used by other organizations to access your [facility] medical records  [id number]        Your Vitals Were     Blood Pressure Pulse Temperature Respirations Pulse Oximetry       126/88 58 98.1  F (36.7  C) (Oral) 18 97%        Primary Care Provider Office Phone # Fax #    Chandrakant Renny Batista [phone]      Equal Access to Services     MARY CUBA : Hadii aad ku hadasho Soomaali, waaxda luqadaha, qaybta kaalmada adeegyada, waxay geovannyin haydavon kimbrough . So [facility] [place] [phone].    ATENCIÓN: Si habla español, tiene a henry disposición servicios gratuitos de asistencia lingüística. Chavaame al [phone].    We comply with applicable federal civil rights laws and [place] laws. We do not discriminate on the basis of race, color, national origin, age, disability, sex, sexual orientation, or gender identity.            Thank you!     Thank you for choosing [facility] for your care. Our goal is always to provide you with excellent care. Hearing back from our patients is one way we can continue to improve our services. Please take a few minutes to complete the written survey that you may receive in the mail after you visit with us. Thank you!             Medication List: This is a list of all your medications and when to take them. Check marks below  indicate your daily home schedule. Keep this list as a reference.          This list is accurate as of 11/29/18 10:59 AM.  Always use your most recent med list.               Medications           Morning Afternoon Evening Bedtime As Needed    ALPHAGAN P 0.1 % ophthalmic solution   Generic drug:  brimonidine                                dorzolamide-timolol 2-0.5 % ophthalmic solution   Commonly known as:  COSOPT   INSTILL 1 DROP IN BOTH EYES TWICE A DAY                                hydrochlorothiazide 25 MG tablet   Commonly known as:  HYDRODIURIL   25 mg daily                                latanoprost 0.005 % ophthalmic solution   Commonly known as:  XALATAN   Inject 1 drop into the eye                                levothyroxine 125 MCG tablet   Commonly known as:  SYNTHROID/LEVOTHROID                                lisinopril 40 MG tablet   Commonly known as:  PRINIVIL/ZESTRIL   40 mg daily

## 2018-12-06 PROBLEM — G61.81 CIDP (CHRONIC INFLAMMATORY DEMYELINATING POLYNEUROPATHY) (H): Status: ACTIVE | Noted: 2018-01-01

## 2018-12-06 NOTE — LETTER
12/6/2018       RE: Ry Churchill  [address]  [place] [address]     Dear Colleague,    Thank you for referring your patient, Ry Churchill, to the [facility] at [facility]. Please see a copy of my visit note below.    Service Date: 12/06/2018      INTERVAL HISTORY:  This patient is seen in followup with progressive weakness.  He is here with his wife and his daughter, Leslie.  Things are not going well.  He is getting progressively weaker.  He can barely get out of his wheelchair with the use of his arms.  He has a walker but it is very difficult for him to get into position to use the walker.  He cannot get around the house.  He cannot go up and down stairs at all, but has to go on his buttocks.  He has had a couple of instances where his legs have collapsed underneath him, but has not injured himself.      I last saw him about 2 weeks ago.  Since then, he did have an electrodiagnostic study.  There was evidence of widespread denervation in the cervical, thoracic and paraspinal myotomes as well as fasciculations identified.  There was no compelling evidence for demyelinating neuropathy.  Sensory responses were preserved.  He also had a spinal tap.  It was a traumatic tap.  There was a high protein of 186.  VDRL and Lyme titer could not be checked because of the contamination from blood.  Oligoclonal banding testing was of uncertain significance because of contamination.  There were 8 white cells, but it was a traumatic tap and the spinal fluid was pink and cloudy.      PHYSICAL EXAMINATION:   GENERAL:  On exam today, there is no major change to bedside testing.     VITAL SIGNS:  His blood pressure is 156/99.     NEUROLOGIC:   He has grade IV strength in shoulder abduction and in elbow extension.  He has 4+ in elbow flexion.  He can barely move the right leg at all.  The left leg is barely antigravity.  He had a very difficult time getting his weight  centered over his legs as he stood up.  He pushes off with his arms but was not stable as he was leaning forward.      ASSESSMENT:   1.  Progressive weakness.      DISCUSSION:  This patient is seen in followup with progressive weakness.  Differential diagnosis is inflammatory neuropathy or motor neuron disease.  The electrical test suggests motor neuron disease, but there is an elevated protein in the CSF.  Because of the patient's precarious status he will be admitted to the hospital.  Consideration will be given to a course of intravenous immunoglobulin.  He may need another spinal tap.  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  He should also have a Lyme titer checked.  Case was discussed with Dr. Mao on the inpatient service.      MD ZORAIDA Tran MD             D: 2018   T: 2018   MT: AKA      Name:     LAURA POWERS   MRN:      [id number]        Account:      [id number]   :      1955           Service Date: 2018      Document: [id number]

## 2018-12-06 NOTE — MR AVS SNAPSHOT
After Visit Summary   12/6/2018    Ry Churchill    MRN: [id number]           Patient Information     Date Of Birth          1955        Visit Information        Provider Department      12/6/2018 8:30 AM Tian Thapa MD [facility]        Today's Diagnoses     Right leg weakness    -  1       Follow-ups after your visit        Follow-up notes from your care team     Return if symptoms worsen or fail to improve.      Your next 10 appointments already scheduled     Dec 07, 2018 12:00 PM CST   (Arrive by 11:45 AM)   New Patient Visit with Alexander Alicia MD   [facility] ([facility])    [address]  [address]  [place] [address] [phone]              Who to contact     Please call your clinic at [phone] to:    Ask questions about your health    Make or cancel appointments    Discuss your medicines    Learn about your test results    Speak to your doctor            Additional Information About Your Visit        MyChart Information     [employer] gives you secure access to your electronic health record. If you see a primary care provider, you can also send messages to your care team and make appointments. If you have questions, please call your primary care clinic.  If you do not have a primary care provider, please call [phone] and they will assist you.      [employer] is an electronic gateway that provides easy, online access to your medical records. With [employer], you can request a clinic appointment, read your test results, renew a prescription or communicate with your care team.     To access your existing account, please contact your [facility] or call [phone] for assistance.        Care EveryWhere ID     This is your Care EveryWhere ID. This could be used by other organizations to access your [facility] medical records  [id number]        Your Vitals Were     Pulse  Pulse Oximetry BMI (Body Mass Index)             66 95% 46.06 kg/m2          Blood Pressure from Last 3 Encounters:   12/06/18 (!) 156/99   11/29/18 151/82   11/15/18 (!) 150/96    Weight from Last 3 Encounters:   12/06/18 145.6 kg (321 lb)   11/15/18 146.1 kg (322 lb 1.6 oz)              Today, you had the following     No orders found for display       Primary Care Provider Office Phone # Fax #    Chandrakant Renny Batista [phone]       [address] [address]        Equal Access to Services     [facility] ROSA ELENA : Hadii [place] messinao Sobrea, waaxda luqadaha, qaybta kaalmada fritz, franck madison. So [facility] [phone].    ATENCIÓN: Si habla español, tiene a henry disposición servicios gratuitos de asistencia lingüística. Ll[facility] Foundation [phone].    We comply with applicable federal civil rights laws and [place] laws. We do not discriminate on the basis of race, color, national origin, age, disability, sex, sexual orientation, or gender identity.            Thank you!     Thank you for choosing [facility] NEUROLOGY  for your care. Our goal is always to provide you with excellent care. Hearing back from our patients is one way we can continue to improve our services. Please take a few minutes to complete the written survey that you may receive in the mail after your visit with us. Thank you!             Your Updated Medication List - Protect others around you: Learn how to safely use, store and throw away your medicines at www.disposemymeds.org.          This list is accurate as of 12/6/18  8:43 AM.  Always use your most recent med list.                   Brand Name Dispense Instructions for use Diagnosis    ALPHAGAN P 0.1 % ophthalmic solution   Generic drug:  brimonidine           dorzolamide-timolol 2-0.5 % ophthalmic solution    COSOPT     INSTILL 1 DROP IN BOTH EYES TWICE A DAY        hydrochlorothiazide 25 MG tablet    HYDRODIURIL     25  mg daily        latanoprost 0.005 % ophthalmic solution    XALATAN     Inject 1 drop into the eye        levothyroxine 125 MCG tablet    SYNTHROID/LEVOTHROID          lisinopril 40 MG tablet    PRINIVIL/ZESTRIL     40 mg daily

## 2018-12-06 NOTE — H&P
"[facility]: [place]  Neurology History and Physical    Patient Name:  Ry Churchill  MRN:  [id number]    :  1955  Date of Admission:  2018  Date of Service:  2018  Primary care provider:  Chandrakant Batista      Chief Complaint:  Leg weakness, progressive     History of Present Illness:   63 year old male who presents with leg weakness that began over 1 year ago with \"Right foot slapping\" noted by his wife that progressed over a period of months. He was assessed at [place] with EMG that identified a right radiculopathy. 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 He has a decompression of L1-2, right sided L5-S1 foraminal procedure as well. This had no effect on his symptoms and the leg weakness with foot drop continued to progress. During 11/15 neurology outpatient appointment he describes using a walker within his home, wheelchair when out of the house.     He denies bowel or bladder symptoms, no difficulty swallowing, hearing, difficulty with speech or vision, no ptosis or diurnal variation in weakness. He was sent here as a direct admission from Dr. Thapa due to concern for his worsening weakness.    His workup so far has included a lumbar puncture performed on  which was notable for a protein of 186, however the tap was traumatic.  Because of this some other tests such as oligoclonal bands in the protein itself was difficult to fully differentiate.  EMG was performed  and showed generalized dysfunction of lower motor neurons in the cervical, thoracic, and lumbosacral segments.  Sensory responses were also affected, but of limited clinical significance.      ROS: A 10-point ROS was performed as per HPI. Pertinent positives  and negatives are included above    PMH:  No past medical history on file.  No past surgical history on file.    Allergies:  Not on File    Medications:      Current Facility-Administered " Medications:      acetaminophen (TYLENOL) tablet 650 mg, 650 mg, Oral, Q4H PRN, Tunde Steele MD     glucose gel 15-30 g, 15-30 g, Oral, Q15 Min PRN **OR** dextrose 50 % injection 25-50 mL, 25-50 mL, Intravenous, Q15 Min PRN **OR** glucagon injection 1 mg, 1 mg, Subcutaneous, Q15 Min PRN, Tunde Steele MD     dorzolamide-timolol (COSOPT) ophthalmic solution 1 drop, 1 drop, Both Eyes, BID, Tunde Steele MD     hydrochlorothiazide (HYDRODIURIL) tablet 25 mg, 25 mg, Oral, Daily, Tunde Steele MD     latanoprost (XALATAN) 0.005 % ophthalmic solution 1 drop, 1 drop, Both Eyes, At Bedtime, Tunde Steele MD     [START ON 12/7/2018] levothyroxine (SYNTHROID/LEVOTHROID) tablet 125 mcg, 125 mcg, Oral, Daily, Tunde Steele MD     lisinopril (PRINIVIL/ZESTRIL) tablet 40 mg, 40 mg, Oral, Daily, Tunde Steele MD     magnesium sulfate 4 g in 100 mL sterile water (premade), 4 g, Intravenous, Q4H PRN, Tunde Steele MD     melatonin tablet 1 mg, 1 mg, Oral, At Bedtime PRN, Tunde Steele MD     naloxone (NARCAN) injection 0.1-0.4 mg, 0.1-0.4 mg, Intravenous, Q2 Min PRN, Tunde Steele MD     ondansetron (ZOFRAN-ODT) ODT tab 4 mg, 4 mg, Oral, Q6H PRN **OR** ondansetron (ZOFRAN) injection 4 mg, 4 mg, Intravenous, Q6H PRN, Tunde Steele MD     potassium chloride (KLOR-CON) Packet 20-40 mEq, 20-40 mEq, Oral or Feeding Tube, Q2H PRN, Tunde Steele MD     potassium chloride 10 mEq in 100 mL intermittent infusion with 10 mg lidocaine, 10 mEq, Intravenous, Q1H PRN, Tunde Steele MD     potassium chloride 10 mEq in 100 mL sterile water intermittent infusion (premix), 10 mEq, Intravenous, Q1H PRN, Tunde Steele MD     potassium chloride 20 mEq in 50 mL intermittent infusion, 20 mEq, Intravenous, Q1H PRN, Tunde Steele MD     potassium chloride ER (K-DUR/KLOR-CON M) CR tablet 20-40 mEq, 20-40 mEq, Oral, Q2H PRN, Tunde Steele MD     potassium phosphate  15 mmol in D5W 250 mL intermittent infusion, 15 mmol, Intravenous, Daily PRN, Tunde Steele MD     potassium phosphate 20 mmol in D5W 250 mL intermittent infusion, 20 mmol, Intravenous, Q6H PRN, Tunde Steele MD     potassium phosphate 20 mmol in D5W 500 mL intermittent infusion, 20 mmol, Intravenous, Q6H PRN, Tunde Steele MD     potassium phosphate 25 mmol in D5W 500 mL intermittent infusion, 25 mmol, Intravenous, Q8H PRN, Tunde Steele MD     prochlorperazine (COMPAZINE) injection 5 mg, 5 mg, Intravenous, Q6H PRN **OR** prochlorperazine (COMPAZINE) tablet 5 mg, 5 mg, Oral, Q6H PRN **OR** prochlorperazine (COMPAZINE) Suppository 12.5 mg, 12.5 mg, Rectal, Q12H PRN, Tunde Steele MD    Social History:  Social History   Substance Use Topics     Smoking status: Never Smoker     Smokeless tobacco: Never Used     Alcohol use No       Family History:    No family history on file.    Physical Examination:   Vitals:  B/P: 149/81, T: 97.1, P: 53, R: 16  General:  Adult, in NAD, cooperative  HEENT: NC/AT, EOMI, PERRL, no icterus, op pink and moist   Cardiac:  RRR, no m/r/g  Chest:  No respiratory distress. CTAB no w/c/r  Abdomen:  S/NT/ND  Extremities:  No LE swelling.    Skin:  No rash or lesion noted  Psych:  Normal mood and affect  Neuro:  Mental status: Alert, attentive, oriented to p/p/t. Follows commands. Fund of knowledge appropriate. Speech is fluent, comprehension and repetition intact. No dysarthria.  Cranial nerves:  CN2-12 tested and no significant findings appreciated.  Eyes conjugate, PERRLA, EOMI, visual fields full, face symmetric, facial sensation intact, shoulder shrug strong, tongue/uvula midline, palate rise symmetric, hearing intact to conversation.   Motor:  Tone normal.  No atrophy. No abnormal movements. No pronator drift. Finger tapping symmetric.     Finger ABd   Wrist Extension   Elbow Flex/Ext   Hip Flex   Knee Flex/ Ext   Dorsiflexion  Plantarflexion    Ext Kevin       Inv/ Eversion   R: 5  5   5/5  2  2/2  0      2           0  0/0    L:  5  5   5/5  4  5/5  4       4    4   3/ 3      Reflexes: Normo-reflexic and symmetrical in upper extremities. No reflexes elicited at the achilles, patellar, or semi-tendinosis bilaterally. Left toe is upgoing   Sensory: Decreased sensation to temperature, proprioception, pinprick of right lower extremity below the knee.  Coordination:  FNF no dysmetria. HTS intact.  Gait: Patient unable to bear weight with legs     Investigations:    A1c 12/6 6.7      Assessment and Plan:  Mr. Churchill presents with a one year history of lower extremity weakness, R>L, as well as progressive hand  weakness. His spinal imaging has been negative for findings that can explain his symptoms. EMG is notable for diffuse weakness of the upper and lower extremities, and CSF results are notable for increased protein to 180, though this sample was traumatic/bloody. His exam is significant for normal cranial nerve exam, relatively preserved upper extremities on formal strength testing, with preserved UE reflexes. His lower extremities have absent reflexes and significant weakness of the Right LE especially, distal greater than proximal distribution. Sensation is affected in the Right LE, with decreased sensation to temperature and proprioception.     The differential includes ALS (limb onset), CIDP ( Napoleon-Norman, given the asymmetry), multifocal motor neuropathy, or Lyme neuroborreliosis. Lead toxicity is also being assessed given his 35 year history as a , and CK has been ordered to assess for a myopathic-predominant disease, however a peripheral nerve disorder is most likely.     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 Ideal body weight for patient calculated to be 102kg, rounded gives 40 g/day)    #Pain  Patient currently does not report any pain  - PRN acetaminophen    #Chronic problems:   -Continue PTA Alphagan ophthalmic solution  -Continue PTA Cosopt ophthalmic solution  -Continue PTA latanoprost ophthalmic solution  - HTN-continue PTA hydrochlorothiazide 25 and lisinopril 40  -Hypothyroidism- continue PTA levothyroxine  -      FEN: Taking PO, regular diet. Electrolyte replacement protocol   PPx: SCDs while awaiting LP, then will start enoxaparin   Code: Full     Patient was seen and discussed with Dr. Mayco Steele MD  Neurology Resident PGY2      I saw and evaluated the patient on 12/6/2018 and agree with the findings and the plan of care as documented in the resident's note.      Complex case.  1.5 year involvement of progressive primary motor problems.  Sensory deficits very minor in distal RLE..  Primarily lower motor neuron involvement with decreased reflexes throughout, but definite upgoing toe on L.  EMG suggestive of motor neuron disease, however CSF done as part of work up with very high protein and appears was in same tube as cell count with only 786 rbcs.  This raises question of motor predominant CIDP or less likely multifocal motor neuropathy.  We will plan on treating with IVIG to assess for response and repeating LP with hopefully less traumatic results.  Broad laboratory work up including CIDP antibodies contacting and neurofascin pending.      Zahira Mao DO   of Neurology

## 2018-12-06 NOTE — TELEPHONE ENCOUNTER
Pt's wife called to inform us Ry has been admitted to [facility] and will not be able to make appt with Dr Alicia on 12/7.  Pt's wife questioned if it's possible for Dr Alicia to see Ry in the hospital.  They are in [facility] [id number].

## 2018-12-06 NOTE — LETTER
Transition Communication Hand-off for Care Transitions to Next Level of Care Provider    Name: Ry Churchill  : 1955  MRN #: [id number]  Primary Care Provider: Chandrakant Batista     Primary Clinic: [address] [address]     Reason for Hospitalization:  Progressive Weakness  CIDP (chronic inflammatory demyelinating polyneuropathy) (H)  muscle weakness   Admit Date/Time: 2018 11:34 AM  Discharge Date:  18  Payor Source: Payor: MEDICARE / Plan: MEDICARE / Product Type: Medicare /              Reason for Communication Hand-off Referral:     Discharge Plan:     Social Work Services Discharge Note     Patient Name:  Ry Chucrhill     Anticipated Discharge Date:  18     Discharge Disposition:   [facility] ([phone])     Following MD:  Facility Assignment     Pre-Admission Screening (PAS) online form has been completed.  The Level of Care (LOC) is:  Determined  Confirmation Code is:  Not required as pt is admitting to ARU.        Additional Services/Equipment Arranged:  SW confirmed readiness for discharge with Dr. Das.  [facility] SW arranged for [place] ([employer] [phone]) to provide w/c transport at 12 noon on 18.     Patient / Family response to discharge plan:  Pt, wife and daughter (Leslie) voice understanding of the discharge plan and agreement with the discharge plan.     Persons notified of above discharge plan:  Pt, wife, daughter (Leslie), Dr. Das and 6A nursing     Staff Discharge Instructions:  Please fax discharge orders and signed hard scripts for any controlled substances.  Please print a packet and send with patient.      CTS Handoff completed:  YES     Medicare Notice of Rights provided to the patient/family:  YES        NATASHA Knott  Social Work, 6A  Phone:  [phone]  Pager:  [phone]  2018

## 2018-12-06 NOTE — PROGRESS NOTES
Service Date: 12/06/2018      INTERVAL HISTORY:  This patient is seen in followup with progressive weakness.  He is here with his wife and his daughter, Leslie.  Things are not going well.  He is getting progressively weaker.  He can barely get out of his wheelchair with the use of his arms.  He has a walker but it is very difficult for him to get into position to use the walker.  He cannot get around the house.  He cannot go up and down stairs at all, but has to go on his buttocks.  He has had a couple of instances where his legs have collapsed underneath him, but has not injured himself.      I last saw him about 2 weeks ago.  Since then, he did have an electrodiagnostic study.  There was evidence of widespread denervation in the cervical, thoracic and paraspinal myotomes as well as fasciculations identified.  There was no compelling evidence for demyelinating neuropathy.  Sensory responses were preserved.  He also had a spinal tap.  It was a traumatic tap.  There was a high protein of 186.  VDRL and Lyme titer could not be checked because of the contamination from blood.  Oligoclonal banding testing was of uncertain significance because of contamination.  There were 8 white cells, but it was a traumatic tap and the spinal fluid was pink and cloudy.      PHYSICAL EXAMINATION:   GENERAL:  On exam today, there is no major change to bedside testing.     VITAL SIGNS:  His blood pressure is 156/99.     NEUROLOGIC:   He has grade IV strength in shoulder abduction and in elbow extension.  He has 4+ in elbow flexion.  He can barely move the right leg at all.  The left leg is barely antigravity.  He had a very difficult time getting his weight centered over his legs as he stood up.  He pushes off with his arms but was not stable as he was leaning forward.      ASSESSMENT:   1.  Progressive weakness.      DISCUSSION:  This patient is seen in followup with progressive weakness.  Differential diagnosis is inflammatory  neuropathy or motor neuron disease.  The electrical test suggests motor neuron disease, but there is an elevated protein in the CSF.  Because of the patient's precarious status he will be admitted to the hospital.  Consideration will be given to a course of intravenous immunoglobulin.  He may need another spinal tap.  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  He should also have a Lyme titer checked.  Case was discussed with Dr. Mao on the inpatient service.      MD ZORAIDA Tran MD             D: 2018   T: 2018   MT: AKA      Name:     LAURA POWERS   MRN:      [id number]        Account:      [id number]   :      1955           Service Date: 2018      Document: [id number]

## 2018-12-06 NOTE — PROCEDURES
[facility] Procedure Note          Lumbar Puncture:      Time: 5:54 PM  Performed by: Tunde Steele  Authorized by: Tnude Steele    Indications: abnormal neurologic exam    Consent given by: Patient who states understanding of the procedure being performed after discussing the risks, benefits and alternatives.    Prior to the start of the procedure and with procedural staff participation, I verbally confirmed the patient s identity using two indicators, relevant allergies, that the procedure was appropriate and matched the consent or emergent situation, and that the correct equipment/implants were available. Immediately prior to starting the procedure I conducted the Time Out with the procedural staff and re-confirmed the patient s name, procedure, and site/side. (The Joint Commission universal protocol was followed.) Yes    Under sterile conditions the patient was positioned L Lateral decubitus with knees drawn up. Betadine solution and sterile drapes were utilized.  Local anesthetic at the site: 2 ml of lidocaine 1% without epinephrine from the LP tray  A 21 G  spinal needle was inserted at the L 3-4 interspace.  Procedure was unsuccessful, no fluid was obtained.   Opening Pressurewas not checked.  A total of 0mL of spinal fluid was obtained.   After the needle was removed, a bandaid and pressure were applied and the patient was instructed to stay horizontal until the results were back.    Complications:  None    Patient tolerance: Patient tolerated the procedure well with no immediate complications.

## 2018-12-07 NOTE — PROCEDURES
[facility], [place]     Neuroradiology Procedure Note     Lumbar Puncture Under Fluoroscopic Guidance:      12/7/2018 12:27 PM    Indications: abnormal neurologic exam    Consent given by: Patient who states understanding of the procedure being performed after discussing the risks, benefits and alternatives.    Prior to the start of the procedure and with procedural staff participation, I verbally confirmed the patient s identity using two indicators, relevant allergies, that the procedure was appropriate and matched the consent or emergent situation, and that the correct equipment/implants were available. Immediately prior to starting the procedure I conducted the Time Out with the procedural staff and re-confirmed the patient s name, procedure, and site/side. (The Joint Commission universal protocol was followed.) Yes    Procedure:    Under sterile conditions the patient was positioned lying prone. Using fluoroscopy, needle entrance side was defined.  Betadine solution and sterile drapes were utilized.  Local anesthetic at the site: 4 ml of lidocaine 1% without epinephrine.    A 22 gauge 5 inch spinal needle was inserted at the L3-L4 interspace.  Opening Pressurewas not checked.  A total of 20 mL of clear and xanthochromic spinal fluid was obtained and sent to the laboratory.   After the needle was removed, a bandaid and pressure were applied and the patient was instructed to stay horizontal until the results were back.    Complications:  None  Patient tolerance: Patient tolerated the procedure well with no immediate complications.    Condition: Stable Patient is transferred to Inpatient unit for post procedure observation.    Sandoval Singh 12/7/2018 12:27 PM

## 2018-12-07 NOTE — CONSULTS
General Surgery Consult Note     Patient name: Ry Churchill  Date of Service: 12/7/2018  Reason for Consult: Progressive weakness, requesting sural nerve biopsy   Requesting Physician: Dr. Mao      Assessment/Plan:   62 yo M with progressive lower motor neuron disease, diagnosis unclear.  General surgery consulted for sural nerve biopsy to aid in diagnosis.    - Follow-up results of today's LP  - If abnormal, will consider sural nerve biopsy next week, inpatient vs outpatient    Discussed with staff, Dr. Lopez.    Linda Carroll  General Surgery PGY2  ------------------------------------------------------------------------  HPI:   62 yo M who is admitted to neurology with 1 year of progressive extremity weakness, EMG consistent with lower motor nerve dysfunction concerning for ALS vs CIDP vs other.  An LP was performed last week which showed elevated protein which is not consistent with either of these suspected diagnoses.  However, this LP was traumatic and so a repeat LP will be attempted today.  If protein still elevated, Neurology team requests sural nerve biopsy to assist with diagnosis.  He does not take blood thinners.      PMH: No history of adverse anesthesia reactions, bleeding, or clotting disorders.    PSH: Low back surgery.    Meds:   Prescriptions Prior to Admission   Medication Sig Dispense Refill Last Dose     ALPHAGAN P 0.1 % ophthalmic solution    12/6/2018 at Unknown time     dorzolamide-timolol (COSOPT) 2-0.5 % ophthalmic solution INSTILL 1 DROP IN BOTH EYES TWICE A DAY  4 12/6/2018 at Unknown time     hydrochlorothiazide (HYDRODIURIL) 25 MG tablet 25 mg daily   12/6/2018 at Unknown time     latanoprost (XALATAN) 0.005 % ophthalmic solution Inject 1 drop into the eye   12/6/2018 at Unknown time     levothyroxine (SYNTHROID/LEVOTHROID) 125 MCG tablet    12/6/2018 at Unknown time     lisinopril (PRINIVIL/ZESTRIL) 40 MG tablet 40 mg daily   12/6/2018 at Unknown time     Allergies: No  Known Allergies  FamHx: No significant FH    SocHx:   Social History     Social History     Marital status:      Spouse name: N/A     Number of children: N/A     Years of education: N/A     Occupational History     Not on file.     Social History Main Topics     Smoking status: Never Smoker     Smokeless tobacco: Never Used     Alcohol use No     Drug use: No     Sexual activity: Not on file     Other Topics Concern     Not on file     Social History Narrative        ROS: 10-pt ROS negative except as noted above.     Objective:   BP (!) 161/93 (BP Location: Left arm)  Pulse 71  Temp 97  F (36.1  C) (Oral)  Resp 16  Wt 144.7 kg (319 lb)  SpO2 94%  BMI 45.77 kg/m2  General - no acute distress, comfortable  HEENT - normocephalic, atraumatic, EOMI  Cardio - regular rate, regular rhythm  Pulm - non labored respirations on room air  Abdomen - soft, nondistended  Neuro - No movement of RLE, RLE strength 0/5, LLE strength 4-5  Extremities - no lower extremity edema, warm, well-perfused  Skin - no rash or bruising  Psych - age appropriate mental status / engagement     Labs:  Reviewed    Imaging:  Reviewed

## 2018-12-07 NOTE — PLAN OF CARE
"Problem: Patient Care Overview  Goal: Plan of Care/Patient Progress Review  Outcome: No Change  Pt arrived to 6A about noon today.   VSS, slight HTN, wife will be bringing home meds later today. Pt oriented x4, able to make needs known. Stating \"I really just want to get some answers.\" Pt neuros include: perrl, BUE strengths 4/5 and wife reports that he has been dropping things lately at home. BLE 1-2/5, N/T to right foot and foot drop (per pt is what started all of this). Pt unable to dorsi or plantar flex with Right at all. LLE very weak dorsi/plantar. Pt was able to stand at bedside to void with assist of 1. Pt also reports many falls at home and that he needs assist at home with all cares. PIV placed in LUE and multiple labs drawn. Pt ordered and tolerated regular dinner. Lungs dim in bases and pt noted to have very weak cough. He states that he has been being followed by ENT for a nodule on his adenoids. He does not seem to have trouble swallowing. Family at bedside, 1 daughter is an RN. Lumbar puncture attempted but unsuccessful at bedside this afternoon, pt states the last one he had was also difficult at another facility d/t scar tissue near incision from past back surgery.  Plan: Start IVIG tonight, L.P in I.R tomorrow.      "

## 2018-12-07 NOTE — PLAN OF CARE
Status: pt on 6A s/t progressive weakness. Pt received first dose of IVIG last evening.   VS: AVSS  Neuros: A&Ox4. BUE 4/5, BLE 3/5, R foot absent dorsi/planter flexion, L foot weak dorsi/planter, numbness to L foot.   GI: Regular diet  : voiding via urinal  IV: PIV SL  Activity: up with 2/Pivot or 2/lift  Pain: denies  Respiratory: LS clear  Skin: dry/flaky skin. Some bruising t/o s/t hx falls.   Social: family bedside last evening; supportive.     Plan of care: LP in IR today. Continue IVIG

## 2018-12-07 NOTE — PLAN OF CARE
Problem: Patient Care Overview  Goal: Plan of Care/Patient Progress Review  Outcome: No Change  Status: Pt on 6A s/p progressive weakness  VS: VSS on RA  Neuros: A&Ox4, BUE 4/5, LLE 1-2/5, RLE 3/5. Absent dorsi/plantar on R, weak dorsi/plantar on L. Numbness on R foot  GI: Tolerating regular diet. BM x1 today  : Voiding w/out difficulty  IV: PIV SL  Activity: Ax1-2 w/gb and walker  Pain: Mild HA post LP  Skin: Bandaid over LP site, small amt dried blood  Labs/Tests: LP completed in IR, General Surgery consult completed  Social: Family at bedside, supportive  Plan of care: IVIG 1/5 completed (12/6). Continue to encourage activity

## 2018-12-07 NOTE — PROGRESS NOTES
[facility]  Patient Name:  Ry Churchill  :  1955    MRN:  [id number]      Date of Admission: 2018                                                                                  SUBJECTIVE:  No acute events overnight. Talking with the pt's wife yesterday (18), she states that she has noticed some personality changes such as being agitated, angry/anaya, and depressed. She states that family friends and their daughters have also noticed this. His wife also stated that he doesn't like to go out anymore because of requiring use of a wheelchair to get around. Pt states that he is willing to do whatever is necessary to try to find out what is wrong. He also states that if he can't be helped he hopes that someone else could benefit from him. Pt notes that this is not a life he would like to live, being unable to walk or be independent. Pt also requested to see ENT for his adenoids.    OBJECTIVE:   Vital Signs:   BP (!) 161/93 (BP Location: Left arm)  Pulse 71  Temp 97  F (36.1  C) (Oral)  Resp 16  Wt 144.7 kg (319 lb)  SpO2 94%  BMI 45.77 kg/m2    PHYSICAL EXAMINATION:  General: adult in NAD, cooperative. Very pleasant and dressed in his own attire. Well groomed.    HEENT: NC/AT. Moist mucus membranes, pink, and no oral lesions noted.   Lungs: no audible w/r/r. Non labored respirations on RA. Weak cough.   Heart: S1 S2 present. RRR no m/r/g audible to ascultation   Extremities: No edema, no cyanosis.  -----------  Mental Status: A&O x3, cooperative and interacting appropriately. No difficulty following commands. Speech fluent, clear and coherent.   Cranial nerves: PERRL, EOMI, Facial sensation intact in all distributions. Facial movements symmetric. Uvula midline, palate elevation symmetric. Tongue protrusion midline with full lateral motion.   Motor: Tone normal. No atrophy or fasciculations. Muscle bulk symmetric and appropriate. No tremors or  other involuntary movements observed. No pronator Drift.      Wrist extension Elbow flex/ext  Hip flex  Knee flex/ext  dorsiflexion Plantarflexion    Right 5 5 2 2 0 2   Left 5 5 3 5 4 4      strength tested by dynameter   - Left hand: average 35 kg (of 2 tests)   - right hand: average 31 kg (of 2 tests)      Reflexes: No reflexes elicited at the achilles, patellar, or semi-tendinosis bilaterally. Left toe is upgoing. Right toes mute. Normal to slightly brisk and symmetrical in upper extremities   Sensory: decreased sensation to temperature and pinprick in RLE below knee.   Coordination:  FNF with no dysmetria.   Station and Gait: unable to assess due to the inability to bear weight with his legs.      PERTINENT INVESTIGATIONS:     neurofascion-155, contactin-1 send out to [facility]- pending     VDRL CSF- pending     Lyme:  negative, absence of detectable Borrelia Burdorferi antibodies.     CSF 12/7/18:  RBC 4038  WBC 14  Glucose 74  Protein 136     Immunofixation 11/17/18  Mildly elevated IgA, otherwise unremarkable      CBC RESULTS:   Recent Labs   Lab Test  12/07/18   0803   WBC  7.1   RBC  5.34   HGB  15.6   HCT  46.4   MCV  87   MCH  29.2   MCHC  33.6   RDW  13.4   PLT  268       Recent Labs   Lab Test  12/07/18   0803   NA  138   POTASSIUM  3.6   CHLORIDE  103   CO2  23   ANIONGAP  13   GLC  162*   BUN  10   CR  0.55*   DAPHNEY  9.1         Lab Results   Component Value Date    TSH 7.42 12/06/2018         A/P  Ry Churchill is a 63 year old Left handed male with a PMH significant for HTN and thyroid disorder. He presented to us with progressive lower extremity weakness, specifically R > L foot drop, and difficulty/weakness with gripping for approximately 15 months.  His workup thus far has included EMG which showed wide spread denervation, but no clear evidence of inflammatory neuropathy. Pt also has received a LP and results were notable for increased protein (180); however, this was noted to be a  traumatic/bloody tap with pink/cloudy fluid. Mr. Churchill has also received brain & lumbar MRI without myelopathy or brain lesions. On neurologic exam it is notable for absent reflexes in the lower extremities (preserved upper extremity reflexes), up going left toe, and decreased sensation to pinprick and temperature on the RLE.     Differential diagnoses at this time include ALS (limb onset) vs. CIDP vs. MMN among others. Given the increased protein in the CSF ALS is less likely as we would expect no protein; however, this was a former traumatic tap and is being repeated. New information provided by family suggests behavioral or personality changes which could either be secondary to a disease process or to the frustration of his worsening disability. This does rasie the question of ALS- FTD and warrants a more thorough mental status examination.  CIDP or other inflammatory neuropathy would be supported if the LP shows an elevated protein (>60 mg/dL) and a normal WBC count.     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 Ideal body weight for patient calculated to be 102kg, rounded gives 40 g/day)    - Right sural nerve biopsy planned for 12/10 or 12/11 with 3 cm biopsy, 1 cm segment fixed in formalin and 2 cm segment fresh in saline soaked gauze on ice. This will be sent to [facility] [place]- they have been notified and will schedule a  once biopsy is complete. They can  be reached at [phone].      #Pain  Patient currently does not report any pain  - PRN acetaminophen     #Chronic problems:   -Continue PTA Alphagan ophthalmic solution  -Continue PTA Cosopt ophthalmic solution  -Continue PTA latanoprost ophthalmic solution  - HTN-continue PTA hydrochlorothiazide 25 and lisinopril 40  -Hypothyroidism- continue PTA levothyroxine      Code status: full  Activity: ad reji with assistance   Consults: PT/OT  Lines: iv access  Diet: regular  DVT prophylaxis: start enoxaparin       Pt seen and discussed with Dr. Mao.         Resident/Fellow Attestation   I, Tunde Steele, was present with the medical student who participated in the service and in the documentation of the note.  I have verified the history and personally performed the physical exam and medical decision making.  I agree with the assessment and plan of care as documented in the note.        Tunde Steele MD  PGY2  Date of Service (when I saw the patient): 12/07/18      Sidney Artis, MS3      I saw and evaluated the patient on 12/7/2018 and agree with the findings and the plan of care as documented in the resident's note.      Repeat LP obtained today, again was traumatic but protein elevation out of proportion to expected given rbc count.  General surgery consulted for nerve biopsy and patient tentatively scheduled for Monday morning.  Many laboratory studies pending including neurofascin and contactin antibodies.    Will plan on treating with 5 days of IVIG to assess for response.   strength dynamometer for objective measure done today with 35kg on L, and 31kg on R.   Plan for nerve biopsy and suspect ARU while awaiting biopsy results to determine long term treatment.      Zahira Mao DO   of Neurology

## 2018-12-08 NOTE — PROGRESS NOTES
12/08/18 1046   Quick Adds   Type of Visit Initial Occupational Therapy Evaluation   Living Environment   Lives With spouse   Living Arrangements house   Home Accessibility stairs within home;bed and bath are not on the first floor;grab bars present (toilet);stairs to enter home  (grab bars in walk-in shower, raised toilet, walker,cane)   Number of Stairs to Enter Home (ramp to enter to foyer)   Number of Stairs Within Home 14  (split-level, 7 up, 7 down)   Transportation Available family or friend will provide   Living Environment Comment Pt is planning to move from his current home to a handicapped accessible home in the [place] area to be closer to his daughters.   Self-Care   Dominant Hand left   Usual Activity Tolerance moderate   Current Activity Tolerance fair   Regular Exercise no   Equipment Currently Used at Home grab bar;dressing device;shower chair;other (see comments)  (4 wheeled walker)   Functional Level Prior   Ambulation 1-->assistive equipment   Transferring 1-->assistive equipment   Toileting 1-->assistive equipment   Bathing 0-->independent   Dressing 3-->assistive equipment and person   Eating 0-->independent   Communication 0-->understands/communicates without difficulty   Swallowing 0-->swallows foods/liquids without difficulty   Cognition 0 - no cognition issues reported   Fall history within last six months yes   Number of times patient has fallen within last six months 5   Prior Functional Level Comment Pt was completely independent approximately 1 year ago, and has needed increasing assist over the past year.   General Information   Onset of Illness/Injury or Date of Surgery - Date 12/06/18   Referring Physician Tian Thapa MD   Patient/Family Goals Statement Get answers   Additional Occupational Profile Info/Pertinent History of Current Problem Mr. Churchill presents with a one year history of lower extremity weakness, R>L, as well as progressive hand  weakness  "  Precautions/Limitations fall precautions   General Info Comments Pt is very pleasant and cooperative.   Cognitive Status Examination   Orientation orientation to person, place and time   Level of Consciousness alert   Able to Follow Commands WNL/WFL   Personal Safety (Cognitive) WNL/WFL   Memory intact   Attention No deficits were identified   Visual Perception   Visual Perception Wears glasses   Pain Assessment   Patient Currently in Pain No   Range of Motion (ROM)   ROM Quick Adds (WFL)   Mobility   Bed Mobility Comments Independent with all bed mobility   Upper Body Dressing   Level of [place]: Dress Upper Body independent   Instrumental Activities of Daily Living (IADL)   IADL Comments Wife performs most IADL tasks   Clinical Impression   Criteria for Skilled Therapeutic Interventions Met yes, treatment indicated   OT Diagnosis weakness affecting ADL independence   Influenced by the following impairments decreased strength and activity tolerance   Assessment of Occupational Performance 5 or more Performance Deficits   Identified Performance Deficits functional transfers, bathing, dressing, home managment, leisure   Clinical Decision Making (Complexity) Low complexity   Therapy Frequency daily   Predicted Duration of Therapy Intervention (days/wks) 2 weeks   Anticipated Discharge Disposition Acute Rehabilitation Facility;Transitional Care Facility   Risks and Benefits of Treatment have been explained. Yes   Patient, Family & other staff in agreement with plan of care Yes   Clinical Impression Comments 63 y.o. male presents with increasing LE weakness x1 year. Pt was previously IND with all ADL/IADLs including cutting down trees and is now requiring assist with LB dressing and osvaldo-area hygiene.   [facility]-PAC  \"6 Clicks\" Daily Activity Inpatient Short Form   1. Putting on and taking off regular lower body clothing? 2 - A Lot   2. Bathing (including washing, rinsing, drying)? 2 - A Lot   3. " Toileting, which includes using toilet, bedpan or urinal? 2 - A Lot   4. Putting on and taking off regular upper body clothing? 4 - None   5. Taking care of personal grooming such as brushing teeth? 4 - None   6. Eating meals? 4 - None   Daily Activity Raw Score (Score out of 24.Lower scores equate to lower levels of function) 18   Total Evaluation Time   Total Evaluation Time (Minutes) 8

## 2018-12-08 NOTE — PROGRESS NOTES
12/08/18 1428   Quick Adds   Type of Visit Initial PT Evaluation   Living Environment   Lives With spouse   Living Arrangements house   Home Accessibility stairs within home;bed and bath are not on the first floor;grab bars present (toilet);stairs to enter home   Number of Stairs to Enter Home (sidewalk into home is built like a ramp)   Number of Stairs Within Home 14  (needs to complete 7 of these to bed/bath)   Transportation Available family or friend will provide   Living Environment Comment Pt is planning to move from his current home to a handicapped accessible home in the [place] area to be closer to his daughters. Further pt and wife discussed renting a handicap accesible apartment in the [facility] where then he can be followed by providers at [facility].   Self-Care   Dominant Hand left   Usual Activity Tolerance moderate   Current Activity Tolerance fair   Regular Exercise yes   Activity/Exercise Type walking  (short bouts within home, LE/UE HEP)   Equipment Currently Used at Home grab bar;dressing device;shower chair;other (see comments)   Activity/Exercise/Self-Care Comment Pt states that he was significantly more active than his current status. He has progressively decreased how much he walks on a daily bases and ability to complete exericses.   Functional Level Prior   Ambulation 1-->assistive equipment   Transferring 1-->assistive equipment   Toileting 1-->assistive equipment   Bathing 0-->independent   Dressing 3-->assistive equipment and person   Eating 0-->independent   Communication 0-->understands/communicates without difficulty   Swallowing 0-->swallows foods/liquids without difficulty   Cognition 0 - no cognition issues reported   Fall history within last six months yes   Number of times patient has fallen within last six months 5   Which of the above functional risks had a recent onset or change? fall history   Prior Functional Level Comment The above function level listed above is immediately  before admission to [facility]. Pt reporting that he was previously IND approximately 1 year ago. Over the past year pt's weakness has significantly declined more assistive and need for equipment was demonstrated.    General Information   Onset of Illness/Injury or Date of Surgery - Date 12/06/18   Referring Physician Tunde Steele MD   Patient/Family Goals Statement Improve strength, balance, and endurance to PLOF.    Pertinent History of Current Problem (include personal factors and/or comorbidities that impact the POC) Per chart: 63 year old Left handed male with a PMH significant for HTN and thyroid disorder. He presented to us with progressive lower extremity weakness, specifically R > L foot drop, and difficulty/weakness with gripping for approximately 15 months.  His workup thus far has included EMG which showed wide spread denervation, but no clear evidence of inflammatory neuropathy. Pt also has received a LP and results were notable for increased protein (180); however, this was noted to be a traumatic/bloody tap with pink/cloudy fluid. Mr. Churchill has also received brain & lumbar MRI without myelopathy or brain lesions. On neurologic exam it is notable for absent reflexes in the lower extremities (preserved upper extremity reflexes), up going left toe, and decreased sensation to pinprick and temperature on the RLE. Overall, through chart review it sounds like pt's symptoms are from an unknown cause per medical team.   Precautions/Limitations fall precautions   General Observations Pt is UP with CGA-min A when he fatigues - close WC follow.   General Info Comments Activity: Up with assist   Cognitive Status Examination   Orientation orientation to person, place and time   Level of Consciousness alert   Follows Commands and Answers Questions 100% of the time   Personal Safety and Judgment intact   Memory intact   Posture    Posture Forward head position;Protracted shoulders;Kyphosis   Strength   Strength  "Comments R LE: 1/5 for DF, PF; 2-/5 for knee extension, 2+/5 for hip abduction, 1/5 hip flexion, 2+/5 knee flexion. L LE: 2+/5 DF and PF, 3+/5 for knee extension, 3+/5 for knee flexion, 3+/5 for hip flexion; defer to OT for UE strength   Bed Mobility   Bed Mobility Comments Min A for LEs   Transfer Skills   Transfer Comments CGA - Min A    Gait   Gait Comments CGA - Min A with close WC for sporadic knee buckling and LE weakness   Balance   Balance Comments Sitting balance is good. Standing balance is poor.   General Therapy Interventions   Planned Therapy Interventions neuromuscular re-education;bed mobility training;gait training;strengthening;transfer training;risk factor education;home program guidelines   Clinical Impression   Criteria for Skilled Therapeutic Intervention yes, treatment indicated   PT Diagnosis Impaired functional mobility   Influenced by the following impairments Significant decrease in strength (sporadic knee buckling), endurance, and balance   Functional limitations due to impairments High fall risk where close WC follow is needed, limited activity tolerance, assistance and need for assistive device for all OOB activity   Clinical Presentation Evolving/Changing   Clinical Presentation Rationale clinical judgement and chart review   Clinical Decision Making (Complexity) Moderate complexity   Therapy Frequency` 5 times/week   Predicted Duration of Therapy Intervention (days/wks) weeks   Anticipated Discharge Disposition Acute Rehabilitation Facility   Risk & Benefits of therapy have been explained Yes   Patient, Family & other staff in agreement with plan of care Yes   Clinical Impression Comments Evaluation completed. Treatment indicated d/t (see above).    [facility] [employer] TM \"6 Clicks\"   2016, Trustees of [facility], under license to [employer].  All rights reserved.   6 Clicks Short Forms Basic Mobility Inpatient Short Form   [facility] [employer]  \"6 Clicks\" V.2 Basic " Mobility Inpatient Short Form   1. Turning from your back to your side while in a flat bed without using bedrails? 3 - A Little   2. Moving from lying on your back to sitting on the side of a flat bed without using bedrails? 3 - A Little   3. Moving to and from a bed to a chair (including a wheelchair)? 3 - A Little   4. Standing up from a chair using your arms (e.g., wheelchair, or bedside chair)? 3 - A Little   5. To walk in hospital room? 2 - A Lot   6. Climbing 3-5 steps with a railing? 1 - Total   Basic Mobility Raw Score (Score out of 24.Lower scores equate to lower levels of function) 15   Total Evaluation Time   Total Evaluation Time (Minutes) 10

## 2018-12-08 NOTE — PROGRESS NOTES
[facility]  Patient Name:  Ry Churchill  :  1955    MRN:  [id number]      Date of Admission: 2018                                                                                  SUBJECTIVE:  - No acute events overnight.   - No change in weakness or numbness, patient is able to sleep throughout the night.     OBJECTIVE:   Vital Signs:   /61 (BP Location: Right arm)  Pulse 55  Temp 96.8  F (36  C) (Oral)  Resp 18  Wt 144.8 kg (319 lb 3.2 oz)  SpO2 94%  BMI 45.8 kg/m2    PHYSICAL EXAMINATION:  General: adult in NAD, cooperative. Very pleasant and dressed in his own attire. Well groomed.    HEENT: NC/AT. Moist mucus membranes, pink, and no oral lesions noted.   Lungs: no audible w/r/r. Non labored respirations on RA. Weak cough.   Heart: S1 S2 present. RRR no m/r/g audible to ascultation   Extremities: No edema, no cyanosis.  -----------  Mental Status: A&O x3, cooperative and interacting appropriately. No difficulty following commands. Speech fluent, clear and coherent.   Cranial nerves: PERRL, EOMI, Facial sensation intact in all distributions. Facial movements symmetric. Uvula midline, palate elevation symmetric. Tongue protrusion midline with full lateral motion.   Motor: Tone normal. No atrophy or fasciculations. Muscle bulk symmetric and appropriate. No tremors or other involuntary movements observed. No pronator Drift.      Wrist extension Elbow flex/ext  Hip flex  Knee flex/ext  dorsiflexion Plantarflexion    Right 5 5 2 2 0 2   Left 5 5 3 5 4 4      strength tested by dynameter   - Left hand: average 35 kg (of 2 tests)   - right hand: average 31 kg (of 2 tests)      Reflexes: No reflexes elicited at the achilles, patellar, or semi-tendinosis bilaterally. Left toe is upgoing. Right toes mute. Normal to slightly brisk and symmetrical in upper extremities   Sensory: decreased sensation to temperature and pinprick in RLE below knee.    Coordination:  FNF with no dysmetria.   Station and Gait: unable to assess due to the inability to bear weight with his legs.      PERTINENT INVESTIGATIONS:     neurofascion-155, contactin-1 send out to [facility]- pending     VDRL CSF- pending     Lyme:  negative, absence of detectable Borrelia Burdorferi antibodies.     CSF 12/7/18:  RBC 4038  WBC 14  Glucose 74  Protein 136     Immunofixation 11/17/18  Mildly elevated IgA, otherwise unremarkable    CBC RESULTS:   Recent Labs   Lab Test  12/07/18 2005 12/07/18   0803   WBC   --   7.1   RBC   --   5.34   HGB   --   15.6   HCT   --   46.4   MCV   --   87   MCH   --   29.2   MCHC   --   33.6   RDW   --   13.4   PLT  264  268     Recent Labs   Lab Test  12/07/18 2005 12/07/18   0803   NA   --   138   POTASSIUM   --   3.6   CHLORIDE   --   103   CO2   --   23   ANIONGAP   --   13   GLC   --   162*   BUN   --   10   CR  0.64*  0.55*   DAPHNEY   --   9.1         Lab Results   Component Value Date    TSH 7.42 12/06/2018         A/P  Ry Churchill is a 63 year old Left handed male with a PMH significant for HTN and thyroid disorder. He presented to us with progressive lower extremity weakness, specifically R > L foot drop, and difficulty/weakness with gripping for approximately 15 months.  His workup thus far has included EMG which showed wide spread denervation, but no clear evidence of inflammatory neuropathy. Pt also has received a LP and results were notable for increased protein (180); however, this was noted to be a traumatic/bloody tap with pink/cloudy fluid. Mr. Churchill has also received brain & lumbar MRI without myelopathy or brain lesions. On neurologic exam it is notable for absent reflexes in the lower extremities (preserved upper extremity reflexes), up going left toe, and decreased sensation to pinprick and temperature on the RLE.     Differential diagnoses at this time include ALS (limb onset) vs. CIDP vs. MMN among others. Given the increased  protein in the CSF ALS is less likely as we would expect normaL protein; however, this was a former traumatic tap and is being repeated. New information provided by family suggests behavioral or personality changes which could either be secondary to a disease process or to the frustration of his worsening disability. This does rasie the question of ALS- FTD and warrants a more thorough mental status examination.  CIDP or other inflammatory neuropathy would be supported if the LP shows an elevated protein (>60 mg/dL) and a normal WBC count.     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 Ideal body weight for patient calculated to be 102kg, rounded gives 40 g/day)  - Repeat  dynameter on 12/10 to assess for change since IVIG     - Right sural nerve biopsy planned for 12/10 or 12/11 with 3 cm biopsy, 1 cm segment fixed in formalin and 2 cm segment fresh in saline soaked gauze on ice. This will be sent to [facility] for analysis- they have been notified and will schedule a  once biopsy is complete. They can be reached at [phone].      #Pain  Patient currently does not report any pain  - PRN acetaminophen     #Chronic problems:   -Continue PTA Alphagan ophthalmic solution  -Continue PTA Cosopt ophthalmic solution  -Continue PTA latanoprost ophthalmic solution  - HTN-continue PTA hydrochlorothiazide 25 and lisinopril 40  -Hypothyroidism- continue PTA levothyroxine    DVT  Prophylaxis: Patient has had doses on 12/7 and 12/8, then it will discontinue in preparation for biopsy on 12/10. Please re-order once patient has completed biopsy.       Code status: full  Activity: ad reji with assistance   Consults: PT/OT  Lines: iv access  Diet: regular  DVT prophylaxis: start enoxaparin       Pt seen and discussed with Dr. Mao.         Tunde Steele MD  PGY2          I saw and evaluated the patient on 12/8/2018 and agree with the findings and the plan of care as documented in the resident's note.      Patient's exam remains stable.  CIDP specific  and other neuropathy labs pending.  Plan is for nerve biopsy on Monday per general surgery.  Today is day #3 out of 5 IVIG.  No difference in exam thus far, will continue to monitor.  Paperwork for biopsy completed.       Zahira Mao DO   of Neurology

## 2018-12-08 NOTE — PLAN OF CARE
Problem: Patient Care Overview  Goal: Plan of Care/Patient Progress Review  Discharge Planner OT   Patient plan for discharge: rehab  Current status: Pt MAX Ax2 and use of grab bars sit>stand from toilet. Total A w/osvaldo-area hygiene while standing with CGA and heavy leaning grab bars with UE. Pt ambulated to bed ~15 ft w/MIN Ax2 and FWW. IND with bed mobility and IND static sitting EOB.  Barriers to return to prior living situation: decreased strength and activity tolerance, decreased ADL and functional transfer independence  Recommendations for discharge: ARU vs TCU, depending on pt's tolerance for therapies  Rationale for recommendations: Pt is highly motivated and well supported. Pt is significantly below baseline function from 1 year ago when symptoms began.       Entered by: Marisa Damon 12/08/2018 12:03 PM

## 2018-12-08 NOTE — PLAN OF CARE
Problem: Patient Care Overview  Goal: Plan of Care/Patient Progress Review  Outcome: No Change  Status: On 6A for progressive lower extremity weakness.  VS: AVSS on RA. Jorje at times.   Neuros: A&Ox4. BUE 4/5. LLE 2/5. RLE 3/5. Absent dorsi/plantar on Right foot. Numbness in R foot. Weak dorsi/plantar on Left foot.   GI: Regular diet. Last BM 12/7  : Voiding w/out difficulty via urinal  IV: PIV SL  Activity: Ax2 w/gb and walker  Pain: Denies pain.  Skin: Bandaide over LP site intact, no visible drainage.  Social: Family visits during the day - not present overnight.   Plan of care: IVIG 2/5 completed this shift - next dose tonight. Plan is for IVIG during stay and nerve biopsy - 12/10 or 12/11 - possible eventual discharge to ARU. Will continue to monitor and follow with POC.

## 2018-12-08 NOTE — PLAN OF CARE
Problem: Patient Care Overview  Goal: Plan of Care/Patient Progress Review  Outcome: No Change  Status: On 6A for progressive weakness  VS: VSS on RA. Jorje at times.   Neuros: A&Ox4. BUE 4/5. LLE 1/5. RLE 3/5. Absent dorsi/plantar on R. Numbness on R foot. Weak dorsi/plantar on L.   GI: Regular diet. Last BM 12/7  : Voiding w/out difficulty  IV: PIV SL  Activity: Ax1-2 w/gb and walker  Pain: LP site pain, declines pharmacology interventions.  Skin: Bandaid over LP site.  Social: Family at bedside this shift.   Plan of care: IVIG 2/5 completed this shift. Continue to monitor and follow POC.

## 2018-12-08 NOTE — PLAN OF CARE
Problem: Patient Care Overview  Goal: Plan of Care/Patient Progress Review  Discharge Planner PT 6A  Patient plan for discharge: Rehab facility  Current status: Initiated and completed PT evaluation. Treatment indicated. CGA-min A for OOB activity with close WC follow and FWW - pt ambulated 10 ft x 2 + 15 ft with seated rest break in between gait bouts. Pt had demonstrated with OT that his knees sporadically buckle and during PT session pt fatigued easily and severe LE weakness observed. Revised, provided cues for and handout, and pt demonstrated LE exercises that he had been performing on a daily basis at home. Encouraged pt to complete LE exercises 2-3x/day and walk with nursing with FWW + gait belt + close WC to doorway and back. Skilled IP PT to follow for balance, strengthening, endurance, and safety awareness to progress towards IND functional mobility.   Barriers to return to prior living situation: medical status, falls risk, poor activity tolerance, knee buckling, increased level of assistance needed, 7 stairs to get to bed/bath at prior living arrangement  Recommendations for discharge: ARU  Rationale for recommendations: Pt demonstrates that he would be a good candidate for an ARU as pt is highly motivated and well supported. Pt is significantly below baseline function from 1 year ago when symptoms began. In addition, he demonstrates the need for extensive therapy and can tolerate it to progress back to PLOF.          Entered by: Pauline Ledbetter 12/08/2018 4:04 PM

## 2018-12-08 NOTE — PLAN OF CARE
Problem: Fall Risk (Adult)  Goal: Identify Related Risk Factors and Signs and Symptoms  Related risk factors and signs and symptoms are identified upon initiation of Human Response Clinical Practice Guideline (CPG).   Status: On 6A for progressive weakness  VS: VSS on RA.  Neuros: A&O x4. BUE 4/5. RLE 1/5. LLE 3/5. Absent dorsi/plantar on R. Numbness on R foot. Weak dorsi/plantar on L.   GI: Regular diet; good intake. BM 12/8  : Voiding w/out difficulty  IV: PIV SL  Activity: Up with 2, GB and walker  Pain: Denies  Skin: LP site covered with band aid   Social: Family at bedside this shift.   Plan of care: IVIG 2/5 completed yesterday, 3/5 to be completed this evening. Continue to monitor and follow current POC.

## 2018-12-09 NOTE — PROGRESS NOTES
General Surgery Progress Note    Patient seen this AM and evaluated for sural nerve biopsy, scheduled for tomorrow.  Patient consented, in paper chart.  NPO at midnight.    Ambrose Barnett MD  Surgery PGY-1  p: [phone]

## 2018-12-09 NOTE — PLAN OF CARE
Status: Pt admitted for work-up of increased BLE weakness, R>L. Pt received 3rd/5 dose of IV Ig yesterday.  VS: VSS on RA. HR in the 50s.   Neuros: A&Ox4. Pt calls appropriately. RLE 1/5, LLE 3/5, 5/5 BUE.   GI: Regular diet. Tolerating well. Passing flatus. LBM 12/8.  : Voiding spontaneously, MEJIA.   IV: PIV x2 SL.   Activity:  Up 2 assist.  Pain: Denies  Skin: Intact   Plan of care: Continue to monitor and follow POC

## 2018-12-09 NOTE — PROGRESS NOTES
[facility]  Patient Name:  Ry Churchill  :  1955    MRN:  [id number]      Date of Admission: 2018                                                                                  SUBJECTIVE:  - No acute events overnight.   - No change in weakness or numbness, patient is able to sleep throughout the night.     OBJECTIVE:   Vital Signs:   /86   Pulse 57   Temp 97.2  F (36.2  C) (Oral)   Resp 18   Wt 144.8 kg (319 lb 3.2 oz)   SpO2 98%   BMI 45.80 kg/m      PHYSICAL EXAMINATION:  General: adult in NAD, cooperative. Very pleasant and dressed in his own attire. Well groomed.    HEENT: NC/AT. Moist mucus membranes, pink, and no oral lesions noted.   Lungs: No respiratory distress  Heart: Regular rate  Extremities: No edema, no cyanosis.  -----------  Mental Status: A&O x3, cooperative and interacting appropriately. No difficulty following commands. Speech fluent, clear and coherent.   Cranial nerves: PERRL, EOMI. Facial movements symmetric. Uvula midline, palate elevation symmetric. Tongue protrusion midline with full lateral motion.   Motor: Tone normal. No atrophy or fasciculations. Muscle bulk symmetric and appropriate. No tremors or other involuntary movements observed. No pronator Drift.      Wrist extension Elbow flex/ext  Hip flex  Knee flex/ext  dorsiflexion Plantarflexion    Right 5 5 2 2 0 2   Left 5 5 3 5 4 4      strength tested by dynameter   - Left hand: average 35 kg (of 2 tests)   - right hand: average 31 kg (of 2 tests)      Reflexes: No reflexes elicited at the achilles, patellar, or semi-tendinosis bilaterally. Left toe is upgoing. Right toes mute. Normal to slightly brisk and symmetrical in upper extremities   Sensory: decreased sensation to temperature and pinprick in RLE below knee.   Coordination:  FNF with no dysmetria.   Station and Gait: unable to assess due to the inability to bear weight with his legs.      PERTINENT  INVESTIGATIONS:     neurofascion-155, contactin-1 send out to [facility]- pending     VDRL CSF- pending     Lyme:  negative, absence of detectable Borrelia Burdorferi antibodies.     CSF 12/7/18:  RBC 4038  WBC 14  Glucose 74  Protein 136     Immunofixation 11/17/18  Mildly elevated IgA, otherwise unremarkable    CBC RESULTS:   Recent Labs   Lab Test  12/07/18 2005 12/07/18   0803   WBC   --   7.1   RBC   --   5.34   HGB   --   15.6   HCT   --   46.4   MCV   --   87   MCH   --   29.2   MCHC   --   33.6   RDW   --   13.4   PLT  264  268     Recent Labs   Lab Test  12/07/18 2005 12/07/18   0803   NA   --   138   POTASSIUM   --   3.6   CHLORIDE   --   103   CO2   --   23   ANIONGAP   --   13   GLC   --   162*   BUN   --   10   CR  0.64*  0.55*   DAPHNEY   --   9.1         Lab Results   Component Value Date    TSH 7.42 12/06/2018         A/P  Ry Churchill is a 63 year old Left handed male with a PMH significant for HTN and thyroid disorder. He presented to us with progressive lower extremity weakness, specifically R > L foot drop, and difficulty/weakness with gripping for approximately 15 months.  His workup thus far has included EMG which showed wide spread denervation, but no clear evidence of inflammatory neuropathy. Pt also has received a LP and results were notable for increased protein (180); however, this was noted to be a traumatic/bloody tap with pink/cloudy fluid. Mr. Churchill has also received brain & lumbar MRI without myelopathy or brain lesions. On neurologic exam it is notable for absent reflexes in the lower extremities (preserved upper extremity reflexes), up going left toe, and decreased sensation to pinprick and temperature on the RLE.     Differential diagnoses at this time include ALS (limb onset) vs. CIDP vs. MMN among others. Given the increased protein in the CSF ALS is less likely as we would expect normaL protein; however, this was a former traumatic tap and is being repeated. New  information provided by family suggests behavioral or personality changes which could either be secondary to a disease process or to the frustration of his worsening disability. This does rasie the question of ALS- FTD and warrants a more thorough mental status examination.  CIDP or other inflammatory neuropathy would be supported if the LP shows an elevated protein (>60 mg/dL) and a normal WBC count.     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 Ideal body weight for patient calculated to be 102kg, rounded gives 40 g/day)  - Repeat  dynameter on 12/10 to assess for change since IVIG     - Right sural nerve biopsy planned for 12/10 with 3 cm biopsy, 1 cm segment fixed in formalin and 2 cm segment fresh in saline soaked gauze on ice. This will be sent to [facility] for analysis- they have been notified and will schedule a  once biopsy is complete. They can be reached at [phone].      #Pain  Patient currently does not report any pain  - PRN acetaminophen     #Chronic problems:   -Continue PTA Alphagan ophthalmic solution  -Continue PTA Cosopt ophthalmic solution  -Continue PTA latanoprost ophthalmic solution  - HTN-continue PTA hydrochlorothiazide 25 and lisinopril 40  -Hypothyroidism- continue PTA levothyroxine    DVT Prophylaxis: Patient has had doses on 12/7 and 12/8, then it will discontinue in  preparation for biopsy on 12/10. Please re-order once patient has completed biopsy.       Code status: full  Activity: ad reji with assistance   Consults: PT/OT  Lines: iv access  Diet: regular      Pt seen and discussed with Dr. Mao.         Christopher Chowdary MD  PGY2      I saw and evaluated the patient on 12/9/2018 and agree with the findings and the plan of care as documented in the resident's note.      Motor neuron disease vs CIDP motor variant.  EMG suggestive of motor neuron disease, however CSF consistent with inflammatory neuropathy.  Contactin-1 ab's and Neurofascin antibodies pending.  Contactin 1 in particular can present with early axon loss.   Doing IVIG trial and plan for biopsy by general surgery tomorrow.  Therapies evaluating for rehab status.      Zahira Mao DO   of Neurology

## 2018-12-09 NOTE — PLAN OF CARE
Problem: Patient Care Overview  Goal: Plan of Care/Patient Progress Review  Outcome: No Change  Status: On 6A for progressive weakness  VS: VSS on RA. Jorje at times.   Neuros: A&Ox4. BUE 4/5. LLE 1/5. RLE 3/5. Absent dorsi/plantar on R. Numbness on R foot. Weak dorsi/plantar on L.   GI: Regular diet. Last BM 12/8  : Voiding w/out difficulty via urinal  IV: PIV SL  Activity: Heavy Ax1-2 w/gb and walker  Pain: Denies  Skin: Bandaid over LP site.  Social: Family at bedside this shift.   Plan of care: IVIG 3/5 completed this shift, tolerated well. Continue to monitor and follow POC.

## 2018-12-09 NOTE — PLAN OF CARE
Status: Pt POD #4 s/p removal of surgical hardware and revision of L5-S1 TLIF  VS: VSS on RA   Neuros: A&Ox4, 4/5 strengths throughout. Per pt, baseline numbness/tingling bilateral fingers and toes.   GI: Reg diet, denies N/V. No BM during shift. LBM 12/4. Per pt, passing flatus.   : Voiding spontaneously, good UO.  IV: PIV infusing D5 1/2 NS with 20 mEq of KCl at 85 mL/hour. Can Discontinue when PO fluids are tolerated well.  Activity: Pt up with assist of 1, GB and walker.   Pain: Pt feels that her pain is well managed with oxycodone 10 mg, PRN Tylenol, robaxin, and and diazepam. Gave oxycodone x2 and Tylenol x1.  Skin: Back incision LEO  Plan of care:?Continue to monitor and follow POC. Plan for possible discharge today or Monday.

## 2018-12-09 NOTE — PLAN OF CARE
Discharge Planner OT   Patient plan for discharge: not discussed  Current status: Pt ambulated in room ~10 ft w/MIN Ax2 and FWW. Completed 5 STS tranfers from elevated bed w/CGA-MIN Ax1 and FWW and stood for 2x 3 minutes and 1x 2.5 minutes. Pt very motivated. Pt limited by LE weakness and the unpredictability of his knees buckling.  Barriers to return to prior living situation: decreased strength and activity tolerance, decreased IND with ADLs, falls risk  Recommendations for discharge: ARU  Rationale for recommendations: Pt is highly motivated, was completely IND prior to onset and would benefit from intense rehab.       Entered by: Marisa Damon 12/09/2018 11:00 AM

## 2018-12-09 NOTE — PLAN OF CARE
VS: VSS   Neuros: A&Ox4. RLE 1/5, LLE 3/5, 5/5 BUE.Absent D/P flexion right.   GI: Regular diet. BM 12/8.  : Voiding spontaneously  IV: PIV x2 SL.   Activity:  Up 2 assist. Needs WC follow for walking in halls. OT ok with pt practicing standing with assist of 1.   Pain: Denies  Skin: Intact   Plan of care: Continue to monitor and follow POC. Plan is for pt to have sural nerve biopsy tomorrow, NPO at midnight.

## 2018-12-10 NOTE — PLAN OF CARE
Status: On 6A for progressive weakness  VS: VSS on RA.  Neuros: A&O x4. BUE 4/5. RLE 1/5. LLE 3/5. Absent dorsi/plantar on R. Numbness on R foot. Weak dorsi/plantar on L.   GI: Regular diet; good intake. Last BM 12/8  : Voiding spontaneously  IV: PIV running IVIG   Activity: Up with 2, GB and walker. Walked out of room to [facility] and back x2 this shift.   Pain: Denies pain   Skin: Intact   Social: Family at bedside this shift.   Plan of care: IVIG 4/5 being completed tonight. Biopsy in AM. Continue to monitor and follow current POC.

## 2018-12-10 NOTE — PLAN OF CARE
PT 6A Discharge Planner PT   Patient plan for discharge: ARU  Current status: Pt appears anxious, initially asking firmly to ambulate then later feels too fearful to try.  Completed several standing and pre-gait activities with FWW CGA-min A.  Pt then ambulates x20ft to zapata and back with FWW, min A.  RLE is cool to touch compared to LLE.  Barriers to return to prior living situation: Falls risk, difficulty walking, difficulty with ADL  Recommendations for discharge: ARU  Rationale for recommendations: Pt with skilled needs for PT and OT, has a supportive spouse, and has complex rehab needs in setting of undetermined pathology.       Entered by: Marla Monteiro 12/10/2018 5:23 PM

## 2018-12-10 NOTE — PROGRESS NOTES
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  EMG suggestive of motor neuron disease, however CSF consistent with inflammatory neuropathy.  Contactin-1 ab's and Neurofascin antibodies pending.  Contactin 1 in particular can present with early axon loss.   Doing IVIG trial and plan for biopsy by general surgery today.  Therapies evaluating for rehab status, recommending ARU                                         SUBJECTIVE:  - No acute events overnight.   - No change in weakness or numbness, patient is able to sleep throughout the night.     OBJECTIVE:   Vital Signs:   /75 (BP Location: Right arm)   Pulse 57   Temp 96.2  F (35.7  C) (Oral)   Resp 18   Wt 144.8 kg (319 lb 3.2 oz)   SpO2 98%   BMI 45.80 kg/m      PHYSICAL EXAMINATION:  General: adult in NAD, cooperative. Very pleasant and dressed in his own attire. Well groomed.    HEENT: NC/AT. Moist mucus membranes, pink, and no oral lesions noted.   Lungs: No respiratory distress  Heart: Regular rate  Extremities: No edema, no cyanosis.  -----------  Mental Status: A&O x3, cooperative and interacting appropriately. No difficulty following commands. Speech fluent, clear and coherent.   Cranial nerves: PERRL, EOMI. Facial movements symmetric. Uvula midline, palate elevation symmetric. Tongue protrusion midline with full lateral motion.   Motor: Tone normal. No atrophy or fasciculations. Muscle bulk symmetric and appropriate. No tremors or other involuntary movements observed. No pronator Drift.      Wrist extension Elbow flex/ext  Hip flex  Knee flex/ext  dorsiflexion Plantarflexion    Right 5 5 2 2 0 2   Left 5 5 3 5 4 4      strength tested by dynameter   - Left hand: average 35 kg (of 2 tests)   - right hand: average 31 kg (of 2  tests)      Reflexes: No reflexes elicited at the achilles, patellar, or semi-tendinosis bilaterally. Left toe is upgoing. Right toes mute. Normal to slightly brisk and symmetrical in upper extremities   Sensory: decreased sensation to temperature and pinprick in RLE below knee.   Coordination:  FNF with no dysmetria.   Station and Gait: unable to assess due to the inability to bear weight with his legs.      PERTINENT INVESTIGATIONS:     neurofascion-155, contactin-1 send out to [facility]- pending     VDRL CSF- pending     Lyme:  negative, absence of detectable Borrelia Burdorferi antibodies.     CSF 12/7/18:  RBC 4038  WBC 14  Glucose 74  Protein 136     Immunofixation 11/17/18  Mildly elevated IgA, otherwise unremarkable    CBC RESULTS:   Lab Results   Component Value Date    WBC 5.5 12/10/2018     Lab Results   Component Value Date    RBC 5.07 12/10/2018     Lab Results   Component Value Date    HGB 14.6 12/10/2018     Lab Results   Component Value Date    HCT 44.3 12/10/2018     No components found for: MCT  Lab Results   Component Value Date    MCV 87 12/10/2018     Lab Results   Component Value Date    MCH 28.8 12/10/2018     Lab Results   Component Value Date    MCHC 33.0 12/10/2018     Lab Results   Component Value Date    RDW 13.4 12/10/2018     Lab Results   Component Value Date     12/10/2018     Last Comprehensive Metabolic Panel:  Sodium   Date Value Ref Range Status   12/10/2018 139 133 - 144 mmol/L Final     Potassium   Date Value Ref Range Status   12/10/2018 3.7 3.4 - 5.3 mmol/L Final     Chloride   Date Value Ref Range Status   12/10/2018 103 94 - 109 mmol/L Final     Carbon Dioxide   Date Value Ref Range Status   12/10/2018 29 20 - 32 mmol/L Final     Anion Gap   Date Value Ref Range Status   12/10/2018 7 3 - 14 mmol/L Final     Glucose   Date Value Ref Range Status   12/10/2018 115 (H) 70 - 99 mg/dL Final     Urea Nitrogen   Date Value Ref Range Status   12/10/2018 13 7 - 30 mg/dL Final      Creatinine   Date Value Ref Range Status   12/10/2018 0.65 (L) 0.66 - 1.25 mg/dL Final     GFR Estimate   Date Value Ref Range Status   12/10/2018 >90 >60 mL/min/1.7m2 Final     Comment:     Non  GFR Calc     Calcium   Date Value Ref Range Status   12/10/2018 8.8 8.5 - 10.1 mg/dL Final         Lab Results   Component Value Date    TSH 7.42 12/06/2018         A/P  Ry Churchill is a 63 year old Left handed male with a PMH significant for HTN and thyroid disorder. He presented 12/6/18 with progressive lower extremity weakness, specifically R > L foot drop, and difficulty/weakness with gripping for approximately 15 months.  His workup thus far has included EMG which showed wide spread denervation, but no clear evidence of inflammatory neuropathy. Pt also has received a LP and results were notable for increased protein (180); however, this was noted to be a traumatic/bloody tap with pink/cloudy fluid. Mr. Churchill has also received brain & lumbar MRI without myelopathy or brain lesions. On neurologic exam it is notable for absent reflexes in the lower extremities (preserved upper extremity reflexes), up going left toe, and decreased sensation to pinprick and temperature on the RLE.     Differential diagnoses at this time include ALS (limb onset) vs. CIDP vs. MMN among others. Given the increased protein in the CSF ALS is less likely as we would expect normaL protein; however, this was a former traumatic tap and is being repeated. New information provided by family suggests behavioral or personality changes which could either be secondary to a disease process or to the frustration of his worsening disability.   CIDP or other inflammatory neuropathy would be supported if the LP shows an elevated protein (>60 mg/dL) and a normal WBC count.     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 Ideal body weight for patient calculated to be  102kg, rounded gives 40 g/day)  - Repeat  dynameter on 12/10 to assess for change since IVIG (not clearly documented by PT)    - Right sural nerve biopsy planned for 12/10 with 3 cm biopsy, 1 cm segment fixed in formalin and 2 cm segment fresh in saline soaked gauze on ice. [place] [facility] [place]- they have been notified and will schedule a  once biopsy is complete. They can be reached at [phone].      #Pain  Patient currently does not report any pain  - PRN acetaminophen     #Chronic problems:   -Continue PTA Alphagan ophthalmic solution  -Continue PTA Cosopt ophthalmic solution  -Continue PTA latanoprost ophthalmic solution  - HTN-continue PTA hydrochlorothiazide 25 and lisinopril 40  -Hypothyroidism- continue PTA levothyroxine    DVT Prophylaxis: Patient has had doses on 12/7 and 12/8, then it will discontinue in preparation for biopsy on 12/10. Please re-order once patient has completed biopsy.       Code status: full  Activity: ad rjei with assistance   Consults: PT/OT  Lines: iv access  Diet: regular  Dispo: PT recommending ARU    Pt seen and discussed with Dr. Dee.         Red Das MD  PGY2

## 2018-12-10 NOTE — PLAN OF CARE
VS: VSS   Neuros: A&Ox4. RLE 1/5, LLE 3/5, 5/5 BUE.Absent D/P flexion right, weak on left. Numbness to RLE  GI: NPO since midnight.  BM 12/10  : Voiding spontaneously  IV: PIV x2 SL.   Activity:  Up 2 assist. Needs WC follow for walking in halls. Worked with Physical Therapy.  Pain: Denies  Skin: Intact   Plan of care: Continue to monitor and follow POC. Plan is for pt to have sural nerve biopsy this afternoon.

## 2018-12-10 NOTE — PLAN OF CARE
Status: Pt on 6A with recent hx of progressive limb weakness, currently receiving course of IVIG, receiving dose 5/5 today.  VS: /75 (BP Location: Right arm)   Pulse 57   Temp 97.2  F (36.2  C) (Oral)   Resp 18   Wt 144.8 kg (319 lb 3.2 oz)   SpO2 94%   BMI 45.80 kg/m    Neuros: BUE 4, RLE 1, LLE 3, absent dorsi/plantar on R, weak dorsi/plantar on L, numbness to RLE  GI: NPO since MN for bx today  : Voiding spontaneously  IV: PIV SL  Activity: Up with A2 with gb, w/c to follow when ambulating longer distances.   Pain: Denies this shift.   Respiratory: LS clear, equal throughout  Skin: Intact  Labs/Tests: Plan for sural nerve bx today, OR time 1555   New this shift: HTN within parameters  Plan of care: Continue to monitor and follow current POC. Plan for OR at 1555.

## 2018-12-11 NOTE — PROGRESS NOTES
Post Op Check     S: Feeling well. Denies pain. Vitals stable. R leg elevated. Has yet to ambulate.     /84   Pulse 68   Temp 97.7  F (36.5  C) (Oral)   Resp 20   Wt 144.8 kg (319 lb 3.2 oz)   SpO2 97%   BMI 45.80 kg/m      Gen: NAD   CV: RRR, no murmurs   Resp: CTAB, on room air without increased work of breathing   Abd: soft, non tender to palpation   Ext: RLE with surgical incision on lateral aspect, distally. No erythema or discharge. Does not appear infected. Skin glue intact.     A/P: POD#0 from sural nerve biopsy     -- Keep RLE elevated as much as possible   -- Okay to ambulate, no weight bearing restrictions  -- No soaking RLE in tub for 2 weeks. Okay to shower after 24 hours. Do not scrub the area with soap but it is okay to let water run over the area.   -- Diet per primary     Please call with questions    Radha Hamilton MD   Surgery PGY-1

## 2018-12-11 NOTE — PROGRESS NOTES
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  EMG suggestive of motor neuron disease, however CSF consistent with inflammatory neuropathy.  Contactin-1 ab's and Neurofascin antibodies pending.  Contactin 1 in particular can present with early axon loss.   Doing IVIG trial and plan for biopsy by general surgery today.  Therapies evaluating for rehab status, recommending ARU                                         SUBJECTIVE:  - No acute events overnight, NPO since midnight for sural nerve biopsy today.  - No change in weakness or numbness, patient is able to sleep throughout the night.     OBJECTIVE:   Vital Signs:   /58   Pulse 57   Temp 96  F (35.6  C) (Oral)   Resp 16   Wt 144.8 kg (319 lb 3.2 oz)   SpO2 94%   BMI 45.80 kg/m      PHYSICAL EXAMINATION:  General: adult in NAD, cooperative. Very pleasant and dressed in his own attire. Well groomed.    HEENT: NC/AT. Moist mucus membranes, pink, and no oral lesions noted.   Lungs: No respiratory distress  Heart: Regular rate  Extremities: No edema, no cyanosis.  -----------  Mental Status: A&O x3, cooperative and interacting appropriately. No difficulty following commands. Speech fluent, clear and coherent.   Cranial nerves: PERRL, EOMI. Facial movements symmetric. Uvula midline, palate elevation symmetric. Tongue protrusion midline with full lateral motion.   Motor: Tone normal. No atrophy or fasciculations. Muscle bulk symmetric and appropriate. No tremors or other involuntary movements observed. No pronator Drift.      Wrist extension Elbow flex/ext  Hip flex  Knee flex/ext  dorsiflexion Plantarflexion    Right 5 5 2 2 0 2   Left 5 5 3 5 4 4      strength tested by dynameter   - Left hand: average 35 kg (of 2 tests)   - right hand: average  31 kg (of 2 tests)      Reflexes: No reflexes elicited at the achilles, patellar, or semi-tendinosis bilaterally. Left toe is upgoing. Right toe mute. Normal to slightly brisk and symmetrical in upper extremities   Sensory: decreased sensation to temperature and pinprick in RLE below knee.   Coordination:  FNF with no dysmetria.   Station and Gait: unable to assess due to the inability to bear weight with his legs.      PERTINENT INVESTIGATIONS:     neurofascion-155, contactin-1 send out to [facility]- pending     VDRL CSF- pending     Lyme:  negative, absence of detectable Borrelia Burdorferi antibodies.     CSF 12/7/18:  RBC 4038  WBC 14  Glucose 74  Protein 136     Immunofixation 11/17/18  Mildly elevated IgA, otherwise unremarkable    CBC RESULTS:   Lab Results   Component Value Date    WBC 5.5 12/10/2018     Lab Results   Component Value Date    RBC 5.07 12/10/2018     Lab Results   Component Value Date    HGB 14.6 12/10/2018     Lab Results   Component Value Date    HCT 44.3 12/10/2018     No components found for: MCT  Lab Results   Component Value Date    MCV 87 12/10/2018     Lab Results   Component Value Date    MCH 28.8 12/10/2018     Lab Results   Component Value Date    MCHC 33.0 12/10/2018     Lab Results   Component Value Date    RDW 13.4 12/10/2018     Lab Results   Component Value Date     12/10/2018     Last Comprehensive Metabolic Panel:  Sodium   Date Value Ref Range Status   12/10/2018 139 133 - 144 mmol/L Final     Potassium   Date Value Ref Range Status   12/10/2018 3.7 3.4 - 5.3 mmol/L Final     Chloride   Date Value Ref Range Status   12/10/2018 103 94 - 109 mmol/L Final     Carbon Dioxide   Date Value Ref Range Status   12/10/2018 29 20 - 32 mmol/L Final     Anion Gap   Date Value Ref Range Status   12/10/2018 7 3 - 14 mmol/L Final     Glucose   Date Value Ref Range Status   12/10/2018 115 (H) 70 - 99 mg/dL Final     Urea Nitrogen   Date Value Ref Range Status   12/10/2018 13 7 - 30 mg/dL  Final     Creatinine   Date Value Ref Range Status   12/10/2018 0.65 (L) 0.66 - 1.25 mg/dL Final     GFR Estimate   Date Value Ref Range Status   12/10/2018 >90 >60 mL/min/1.7m2 Final     Comment:     Non  GFR Calc     Calcium   Date Value Ref Range Status   12/10/2018 8.8 8.5 - 10.1 mg/dL Final         Lab Results   Component Value Date    TSH 7.42 12/06/2018         A/P  Ry Churchill is a 63 year old Left handed male with a PMH significant for HTN and thyroid disorder. He presented 12/6/18 with progressive lower extremity weakness, specifically R > L foot drop, and difficulty/weakness with gripping for approximately 15 months.  His workup thus far has included EMG which showed wide spread denervation, but no clear evidence of inflammatory neuropathy. Pt also has received a LP and results were notable for increased protein (180); however, this was noted to be a traumatic/bloody tap with pink/cloudy fluid. Mr. Churchill has also received brain & lumbar MRI without myelopathy or brain lesions. On neurologic exam it is notable for absent reflexes in the lower extremities (preserved upper extremity reflexes), up going left toe, and decreased sensation to pinprick and temperature on the RLE.     Differential diagnoses at this time include ALS (limb onset) vs. CIDP vs. MMN among others. Given the increased protein in the CSF ALS is less likely as we would expect normaL protein; however, this was a former traumatic tap and is being repeated. New information provided by family suggests behavioral or personality changes which could either be secondary to a disease process or to the frustration of his worsening disability.   CIDP or other inflammatory neuropathy would be supported if the LP shows an elevated protein (>60 mg/dL) and a normal WBC count.     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 Ideal body weight for patient calculated to be  102kg, rounded gives 40 g/day)  - Repeat  dynameter on 12/10 to assess for change since IVIG (not clearly documented by PT)    - Right sural nerve biopsy planned for 12/10 accomplished 12/11 with 3 cm biopsy, 1 cm segment fixed in formalin and 2 cm segment fresh in saline soaked gauze on ice. This will be sent to [facility] for analysis- they have been notified and will schedule a  once biopsy is complete. They can be reached at [phone].      #Pain  Patient currently does not report any pain  - PRN acetaminophen     #Chronic problems:   -Continue PTA Alphagan ophthalmic solution  -Continue PTA Cosopt ophthalmic solution  -Continue PTA latanoprost ophthalmic solution  - HTN-continue PTA hydrochlorothiazide 25 and lisinopril 40  -Hypothyroidism- continue PTA levothyroxine    DVT Prophylaxis: Patient has had doses on 12/7 and 12/8, then it will discontinue in preparation for biopsy on 12/10. Please re-order once patient has completed biopsy.       Code status: full  Activity: ad reji with assistance   Consults: PT/OT/EMG  Lines: iv access  Diet: regular after procedure  Dispo: PT recommending ARU, patient is medically clear to discharge    Pt seen and discussed with Dr. Dee.         Red Das MD  PGY2

## 2018-12-11 NOTE — ANESTHESIA PREPROCEDURE EVALUATION
Anesthesia Pre-Procedure Evaluation    Patient: Ry Churchill   MRN:     [id number] Gender:   male   Age:    63 year old :      1955        Preoperative Diagnosis: RIGHT LEG SURAL NERVE BIOPSY   Procedure(s):  RIGHT LEG SURAL NERVE BIOPSY     No past medical history on file.   Past Surgical History:   Procedure Laterality Date     BACK SURGERY       KNEE SURGERY            Anesthesia Evaluation     .             ROS/MED HX    ENT/Pulmonary:     (+)SOPHY risk factors obese, , . .    Neurologic:     (+)other neuro     Cardiovascular:     (+) hypertension----. : . . . :. .       METS/Exercise Tolerance:     Hematologic:         Musculoskeletal:         GI/Hepatic:         Renal/Genitourinary:         Endo:     (+) thyroid problem .      Psychiatric:         Infectious Disease:         Malignancy:         Other:                         PHYSICAL EXAM:   Mental Status/Neuro: A/A/O   Airway: Facies: Feasible  Mallampati: III  Mouth/Opening: Full  TM distance: > 6 cm  Neck ROM: Full   Respiratory: Auscultation: CTAB     Resp. Rate: Normal     Resp. Effort: Normal      CV: Rhythm: Regular  Rate: Age appropriate  Heart: Normal Sounds   Comments:      Dental: Normal                  Lab Results   Component Value Date    WBC 5.5 2018    HGB 14.6 2018    HCT 44.3 2018     2018    SED 9 2018     2018    POTASSIUM 3.8 2018    CHLORIDE 102 2018    CO2 28 2018    BUN 13 2018    CR 0.61 (L) 2018     (H) 2018    DAPHNEY 9.1 2018    PTT 33 2018    INR 1.07 2018    TSH 7.42 (H) 2018    T4 1.09 2018       Preop Vitals  BP Readings from Last 3 Encounters:   18 (!) 170/95   18 (!) 156/99   18 151/82    Pulse Readings from Last 3 Encounters:   18 57   18 66   18 64      Resp Readings from Last 3 Encounters:   18 16   18 18   11/15/18 18    SpO2 Readings from Last  "3 Encounters:   12/11/18 95%   12/06/18 95%   11/29/18 97%      Temp Readings from Last 1 Encounters:   12/11/18 35.2  C (95.4  F) (Oral)    Ht Readings from Last 1 Encounters:   11/15/18 1.778 m (5' 10\")      Wt Readings from Last 1 Encounters:   12/07/18 144.8 kg (319 lb 3.2 oz)    Estimated body mass index is 45.8 kg/m  as calculated from the following:    Height as of 11/15/18: 1.778 m (5' 10\").    Weight as of this encounter: 144.8 kg (319 lb 3.2 oz).     LDA:  Peripheral IV 12/10/18 Left;Anterior Upper forearm (Active)   Site Assessment WDL 12/10/2018  4:00 PM   Line Status Saline locked 12/10/2018  4:00 PM   Phlebitis Scale 0-->no symptoms 12/10/2018  4:00 PM   Infiltration Scale 0 12/10/2018  4:00 PM   Infiltration Site Treatment Method  None 12/10/2018  4:00 PM   Extravasation? No 12/10/2018  4:00 PM   Number of days: 1            Assessment:   ASA SCORE: 3    NPO Status: > 6 hours since completed [employer]   Documentation: H&P complete; Preop Testing complete; Consents complete   Proceeding: Proceed without further delay  Tobacco Use:  NO Active use of Tobacco/UNKNOWN Tobacco use status     Plan:   Anes. Type:  General   Pre-Induction: Midazolam IV; Acetaminophen PO   Induction:  IV (Standard)   Airway: Oral ETT   Access/Monitoring: PIV   Maintenance: Balanced   Emergence: Recovery Site (PACU/ICU)   Logistics: Observation/Admission     Postop Pain/Sedation Strategy:  Standard-Options: Opioids PRN     PONV Management:  Adult Risk Factors:, Non-Smoker, Postop Opioids  Prevention: Ondansetron     CONSENT: Direct conversation   Plan and risks discussed with: Patient   Blood Products: Consent Deferred (Minimal Blood Loss)                         Gama Wadsworth MD  "

## 2018-12-11 NOTE — BRIEF OP NOTE
[facility], [place]    Brief Operative Note    Pre-operative diagnosis: RIGHT LEG SURAL NERVE BIOPSY  Post-operative diagnosis * No post-op diagnosis entered *  Procedure: Procedure(s):  RIGHT LEG SURAL NERVE BIOPSY  Surgeon: Surgeon(s) and Role:     * Rufino Lopez MD - Primary     * Brianne Hamilton MD - Resident - Assisting  Anesthesia: General   Estimated blood loss: Minimal  Drains: None  Specimens:   ID Type Source Tests Collected by Time Destination   1 : Right Sural Nerve Biopsy Tissue Other OR DOCUMENTATION ONLY Rufino Lopez MD 12/11/2018 11:40 AM      Findings:   Sural nerve identified without difficulty.   Complications: None.  Implants: None.    Plan:   -- Back to floor   -- Okay for diet per primary     Radha Hamilton MD   Surgery PGY-1

## 2018-12-11 NOTE — PLAN OF CARE
Status: On 6A for progressive weakness  VS: VSS on RA.  Neuros: A&O x4. BUE 4/5. RLE 1/5. LLE 3/5. Absent dorsi/plantar on R. Numbness on R foot. Weak dorsi/plantar on L.   GI: Regular diet; good intake. Last BM 12/8  : Voiding spontaneously  IV: PIV running IVIG   Activity: Up with 2, GB and walker.   Pain: Denies pain   Skin: Intact   Social: Family at bedside this shift.   Plan of care: IVIG 5/5 being completed tonight. Biopsy cancelled this shift, will attempt tomorrow; NPO at midnight. Continue to monitor and follow current POC.

## 2018-12-11 NOTE — ANESTHESIA POSTPROCEDURE EVALUATION
Anesthesia POST Procedure Evaluation    Patient: Ry Churchill   MRN:     [id number] Gender:   male   Age:    63 year old :      1955        Preoperative Diagnosis: RIGHT LEG SURAL NERVE BIOPSY   Procedure(s):  RIGHT LEG SURAL NERVE BIOPSY   Postop Comments: No value filed.       Anesthesia Type:  General    Reportable Event: NO     PAIN: Uncomplicated   Sign Out status: Comfortable, Well controlled pain     PONV: No PONV   Sign Out status:  No Nausea or Vomiting     Neuro/Psych: Uneventful perioperative course   Sign Out Status: Preoperative baseline; Age appropriate mentation     Airway/Resp.: Uneventful perioperative course   Sign Out Status: Non labored breathing, age appropriate RR; Resp. Status within EXPECTED Parameters     CV: Uneventful perioperative course   Sign Out status: Appropriate BP and perfusion indices; Appropriate HR/Rhythm     Disposition:   Sign Out in:  PACU  Recovery Course: Uneventful  Follow-Up: Not required           Last Anesthesia Record Vitals:  CRNA VITALS  2018 1151 - 2018 1251      2018             Pulse:  67    SpO2:  99 %    Resp Rate (observed):  12          Last PACU/Preop Vitals:  Vitals:    18 1245 18 1300 18 1321   BP: 147/86 146/88 135/87   Pulse:      Resp: 16 16 16   Temp:  36.2  C (97.1  F) 36.2  C (97.2  F)   SpO2: 95% 93% 94%         Electronically Signed By: Benjamin Camara MD, 2018, 1:40 PM

## 2018-12-11 NOTE — PLAN OF CARE
VS: VSS   Neuros: A&Ox4. RLE 1/5, LLE 3/5, 5/5 BUE.Absent D/P flexion right, weak on left. Numbness to RLE. RLE cool to touch  GI:Regular diet. BM 12/10  : Voiding spontaneously  IV: PIV x2 SL.   Activity:  Up 2 assist. Needs WC follow for walking in halls. RLE to be elevated  Pain: Denies  Skin: Intact  except incision to RLE near ankle (nerve biopsy done today, was back to floor at 1320). Liquid bandage intact, no drainage.   Plan of care: Continue to monitor and follow POC.

## 2018-12-11 NOTE — ANESTHESIA CARE TRANSFER NOTE
Patient: Ry Churchill    Procedure(s):  RIGHT LEG SURAL NERVE BIOPSY    Diagnosis: RIGHT LEG SURAL NERVE BIOPSY  Diagnosis Additional Information: No value filed.    Anesthesia Type:   No value filed.     Note:  Airway :Face Mask  Patient transferred to:PACU  Comments: Awake with good patent airway.  VSSHandoff Report: Identifed the Patient, Identified the Reponsible Provider, Reviewed the pertinent medical history, Discussed the surgical course, Reviewed Intra-OP anesthesia mangement and issues during anesthesia, Set expectations for post-procedure period and Allowed opportunity for questions and acknowledgement of understanding      Vitals: (Last set prior to Anesthesia Care Transfer)    CRNA VITALS  12/11/2018 1151 - 12/11/2018 1228      12/11/2018             Pulse:  67    SpO2: 100   (Abnormal)     Resp Rate (observed):  12  (Abnormal)                 Electronically Signed By: SHENG Sanon CRNA  December 11, 2018  12:28 PM

## 2018-12-11 NOTE — PLAN OF CARE
OT/6A: Cancel- Pt to OR this AM for nerve biopsy. Will check back this pm if pt appropriate for therapy, otherwise will reschedule for 12/12/18.

## 2018-12-11 NOTE — PLAN OF CARE
Status: On 6A for progressive weakness  VS:VSS/RA   Neuros:A&O x3. BUE 4/5, RLE 1/5, LLE 3/5.  Absent dorsi/plantar on right.  Numbness on R foot.  Weak dorsi/plantar on left.   GI: Regular diet, good PO. Feeds self   : Voiding spont.  Using urinal. Last BM 12/8 ?  IV:SL'd  Activity: Up with A2, GB+, Walker +2  Pain: Denies pain  Respiratory/Trach: clear   Skin: intact    Plan of care: Biopsy 12/11.  Has been NPO since MN.

## 2018-12-12 NOTE — PLAN OF CARE
Status: On 6A for progressive lower extremity weakness.   VS: VSS, on RA during day.   Neuros: A&Ox4. BUE 5/5, RLE 1/5, LLE 3/5.  Absent dorsi/plantar on right.  Numbness on R foot.  Moderate dorsi/plantar on L.   GI: Tolerating regular diet, passing gas. Anticipating bowel movement.  : Voiding spont.   IV: SL'd  Activity: Up with A2, GB+, Walker +2 with wheelchair follow. Pt motivated to stand and complete exercises. Walk with nursing 1x and 2x standing practice from chair.   Pain: Denies pain.   Skin: R leg biopsy site approximated with liquid bandage. Hermes R foot   Plan of care: Biopsy 12/11.  Has been NPO since MN.

## 2018-12-12 NOTE — DISCHARGE SUMMARY
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 He was assessed at [place] with EMG that identified a right radiculopathy. 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 He has a decompression of L1-2, right sided L5-S1 foraminal procedure as well. This had no effect on his symptoms and the leg weakness with foot drop continued to progress. During 11/15 neurology outpatient appointment he describes using a walker within his home, wheelchair when out of the house.      He denied bowel or bladder symptoms, or difficulty swallowing, hearing, difficulty with speech or vision, or ptosis or diurnal variation in weakness. He was sent here as a direct admission from Dr. Thapa due to concern for his worsening weakness.     His workup  included a lumbar puncture performed on  which was notable for a protein of 186, however the tap was traumatic.  Because of this some other tests such as oligoclonal bands in the protein itself was difficult to fully differentiate.  EMG was performed  and showed generalized dysfunction of lower motor neurons in the cervical, thoracic, and lumbosacral segments.  Sensory responses were also affected, but of limited clinical significance.        Hospital Course:  63 year old Left handed male with a PMH significant for HTN and thyroid disorder. " He presented 12/6/18 with progressive lower extremity weakness, specifically R > L foot drop, and difficulty/weakness with gripping for approximately 15 months.  His workup  included EMG which showed wide spread denervation, but no clear evidence of inflammatory neuropathy. Pt also received a LP and results were notable for increased protein (180); however, this was noted to be a traumatic/bloody tap with pink/cloudy fluid. Mr. Churchill also received brain & lumbar MRI without myelopathy or brain lesions. Neurologic exam was notable for absent reflexes in the lower extremities (preserved upper extremity reflexes), up going left toe, and decreased sensation to pinprick and temperature on the RLE.      Differential diagnoses entertained were ALS (limb onset) vs. CIDP vs. MMN. Given the increased protein in the CSF ALS was felt equivocal as one would expect brissa protein; however, this was a former traumatic tap.  New information provided by family suggests behavioral or personality changes which could either be secondary to a disease process or to the frustration of his worsening disability.   CIDP or other inflammatory neuropathy would be supported if the LP showed an elevated protein (>60 mg/dL) and a normal WBC count.      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  -Antibody screening for contactin 1 and neurofascion 155 pending  -formal  strength assessment with dynameter, assessed with noted improvement prior to discharge  -HIV antibodies negative  -Sensorimotor neuropathy panel (includes GM1 and MAG antibodies) pending  -Lead blood level wnl <2.0  -CSF with glucose, protein, cell count, lyme, VDRL, bands obtained (notes above)  -5 days of IVIG, 40 g/ day started 12/6-12/10  - Repeat  dynameter on 12/10 to assess for  change since IVIG (not clearly documented by PT)  -Right sural nerve biopsy  accomplished 12/11 with 3 cm biopsy, 1 cm segment fixed in formalin and 2 cm segment fresh in saline soaked gauze on ice. Being read by Dr. Robison, as of 12/13 not yet read.     #Pain  Patient currently does not report any pain  -PRN acetaminophen     #Chronic problems:   -Continue PTA Alphagan ophthalmic solution  -Continue PTA Cosopt ophthalmic solution  -Continue PTA latanoprost ophthalmic solution  -HTN -continued PTA hydrochlorothiazide 25 and lisinopril 40  -Hypothyroidism- continue PTA levothyroxine    Pertinent Investigations:    Results for orders placed or performed during the hospital encounter of 12/06/18   XR Lumbar Puncture Spinal Tap Diag    Narrative    Lumbar Puncture using Fluoroscopy    History:  CIDP in differential, please draw 20ml fluid at least for  testing.    Procedure note: Verbal and written consent for lumbar puncture was  obtained from the patient, and benefits and risk of the procedure were  explained, including but not limited to worsening headache,  hemorrhage, infection, lower extremity pain, or nerve root injury. The  patient was sterilely prepped and draped with the patient in the prone  position, over the lower back. Under fluoroscopic guidance, the  interlaminar spaces were noted. 1% lidocaine was administered for  local anesthetic over the L3-4 interlaminar space, and a 22 gauge 5  inch needle was advanced into the thecal sac under fluoroscopic  guidance.  There was initial show of blood colored CSF which only  partially cleared.  Approximately 20 cc of CSF were collected.    The needle was removed with the stylet in place. There was no  immediate complication associated with the procedure. Samples were  sent for the requested laboratory testing.      Estimated blood loss: Less than 1 cc.    Fluoroscopy time: 0.9 minutes       Impression    Impression: Successful lumbar puncture without immediate  complication.    I, ASHWINI COLE MD, attest that I was present for all critical  portions of the procedure and was immediately available to provide  guidance and assistance during the remainder of the procedure.    I have personally reviewed the examination and initial interpretation  and I agree with the findings.    ASHWINI COLE MD     Recent Labs   Lab Test 12/11/18  0627 12/10/18  0608 12/09/18  0707   WBC 5.5 5.5 6.5   HGB 14.6 14.6 14.8   MCV 88 87 88    202 248      Recent Labs   Lab Test 12/11/18  0627 12/10/18  0608 12/09/18  0707    139 138   POTASSIUM 3.8 3.7 3.7   CHLORIDE 102 103 103   CO2 28 29 29   BUN 13 13 12   CR 0.61* 0.65* 0.66   ANIONGAP 7 7 6   DAPHNEY 9.1 8.8 9.1   * 115* 134*     No lab results found.  Anticoagulation Dose History     Recent Dosing and Labs Latest Ref Rng & Units 12/7/2018    INR 0.86 - 1.14 1.07        No lab results found.  No lab results found.  Recent Labs   Lab Test 12/07/18  1125   CULT Culture negative monitoring continues     Recent Labs   Lab Test 12/06/18  1706 12/06/18  1347   TSH 7.42*  --    T4 1.09  --    A1C  --  6.7*         Pending Investigations:  Labs/Studies pending at time of discharge: sural nerve biopsy  -Antibody screening for contactin 1 and neurofascion 155 pending  -Sensorimotor neuropathy panel (includes GM1 and MAG antibodies) pending    Consultations:  PT for dynameter testing  OT  PMR  Surgery for sural nerve biopsy    Discharge Physical Examination:  Vitals: /68 (BP Location: Right arm)   Pulse 57   Temp 96.5  F (35.8  C) (Oral)   Resp 18   Wt 144.8 kg (319 lb 3.2 oz)   SpO2 96%   BMI 45.80 kg/m    General: adult in NAD, cooperative. Very pleasant and dressed in his own attire. Well groomed.    HEENT: NC/AT. Moist mucus membranes, pink, and no oral lesions noted.   Lungs: No respiratory distress  Heart: Regular rate  Extremities: No edema, no cyanosis.  -----------  Mental Status: A&O x3, cooperative and  interacting appropriately. No difficulty following commands. Speech fluent, clear and coherent.   Cranial nerves: PERRL, EOMI. Facial movements symmetric. Uvula midline, palate elevation symmetric. Tongue protrusion midline with full lateral motion.   Motor: Tone normal. No atrophy or fasciculations. Muscle bulk symmetric and appropriate. No tremors or other involuntary movements observed. No pronator Drift.        Wrist extension Elbow flex/ext  Hip flex  Knee flex/ext  dorsiflexion Plantarflexion    Right 5 5 2 2 0 2   Left 5 5 3 5 4 4       strength tested by dynameter early in hospital course:               - Left hand: average 35 kg (of 2 tests)               - right hand: average 31 kg (of 2 tests)    Repeat 12/12 completed by OT   R hand: 77lbs, normal range for R hand: 99.1lbs, L hand: 85lbs, normal range for L hand: 76.5    Reflexes: No reflexes elicited at the achilles, patellar, or semi-tendinosis bilaterally. Left toe is upgoing. Right toe mute. Normal to slightly brisk and symmetrical in upper extremities   Sensory: decreased sensation to temperature and pinprick in RLE below knee.   Coordination:  FNF with no dysmetria.   Station and Gait: unable to assess due to the inability to bear weight with his legs.       Discharge Medications:  Current Discharge Medication List      CONTINUE these medications which have NOT CHANGED    Details   ALPHAGAN P 0.1 % ophthalmic solution       dorzolamide-timolol (COSOPT) 2-0.5 % ophthalmic solution INSTILL 1 DROP IN BOTH EYES TWICE A DAY  Refills: 4      hydrochlorothiazide (HYDRODIURIL) 25 MG tablet 25 mg daily      latanoprost (XALATAN) 0.005 % ophthalmic solution Inject 1 drop into the eye      levothyroxine (SYNTHROID/LEVOTHROID) 125 MCG tablet       lisinopril (PRINIVIL/ZESTRIL) 40 MG tablet 40 mg daily             Discharge follow-up and instructions:     General info for SNF    Length of Stay Estimate: Short Term Care: Estimated # of Days <30  Condition at  Discharge: Improving  Level of care:skilled   Rehabilitation Potential: Good  Admission H&P remains valid and up-to-date: Yes  Recent Chemotherapy: N/A  Use Nursing Home Standing Orders: Yes     Mantoux instructions    Give two-step Mantoux (PPD) Per Facility Policy Yes     Reason for your hospital stay    You were admitted for evaluation of CIDP.  This evaluation included EMG studies and a biopsy of your sural nerve (results pending).  You will be followed by neuromuscular specialists after this hospitalization.     Follow Up and recommended labs and tests    Referral has been placed to see Dr. Collins after this hospitalization regarding patient's CIDP and to follow up on results of sural nerve biopsy.     Physical Therapy Adult Consult    Evaluate and treat as clinically indicated.    Reason:  LE weakness     Occupational Therapy Adult Consult    Evaluate and treat as clinically indicated.    Reason:  LE weakness, progressive     Fall precautions     Advance Diet as Tolerated    Follow this diet upon discharge: Orders Placed This Encounter      Regular Diet Adult   Follow up in neuromuscular clinic after rehabilitation.    Patient seen and discussed with Dr. Luiz Das MD  Neurology PGY2  [facility]    This note was written with the assistance of the Dragon voice-dictation technology software. The final document, although reviewed, may contain errors. For corrections, please contact the office.

## 2018-12-12 NOTE — PROGRESS NOTES
[facility]  Patient Name:  Ry Churchill  :  1955    MRN:  [id number]      Date of Admission: 2018                                               Summary:  Motor neuron disease vs CIDP motor variant.  EMG suggestive of motor neuron disease, however CSF consistent with inflammatory neuropathy.  Contactin-1 ab's and Neurofascin antibodies pending.  Contactin 1 in particular can present with early axon loss.   Doing IVIG trial and plan for biopsy by general surgery today.  Therapies evaluating for rehab status, recommending ARU                                 SUBJECTIVE:  - No change in weakness or numbness, patient is able to sleep throughout the night.       OBJECTIVE:   Vital Signs:   BP (P) 143/89   Pulse 57   Temp 97.4  F (36.3  C) (Oral)   Resp 20   Wt 144.8 kg (319 lb 3.2 oz)   SpO2 93%   BMI 45.80 kg/m      PHYSICAL EXAMINATION:  General: adult in NAD, cooperative. Very pleasant and dressed in his own attire. Well groomed.    HEENT: NC/AT. Moist mucus membranes, pink, and no oral lesions noted.   Lungs: No respiratory distress  Heart: Regular rate  Extremities: No edema, no cyanosis.  -----------  Mental Status: A&O x3, cooperative and interacting appropriately. No difficulty following commands. Speech fluent, clear and coherent.   Cranial nerves: PERRL, EOMI. Facial movements symmetric. Uvula midline, palate elevation symmetric. Tongue protrusion midline with full lateral motion.   Motor: Tone normal. No atrophy or fasciculations. Muscle bulk symmetric and appropriate. No tremors or other involuntary movements observed. No pronator Drift.      Wrist extension Elbow flex/ext  Hip flex  Knee flex/ext  dorsiflexion Plantarflexion    Right 5 5 2 2 0 2   Left 5 5 3 5 4 4      strength tested by dynameter 12/10 (test pending today)   - Left hand: average 35 kg (of 2 tests)   - right hand: average 31 kg (of 2 tests)      Reflexes: No reflexes  elicited at the achilles, patellar, or semi-tendinosis bilaterally. Left toe is upgoing. Right toe mute. Normal to slightly brisk and symmetrical in upper extremities   Sensory: decreased sensation to temperature and pinprick in RLE below knee.   Coordination:  FNF with no dysmetria.   Station and Gait: unable to assess due to the inability to bear weight with his legs.      PERTINENT INVESTIGATIONS:     neurofascion-155, contactin-1 send out to [facility]- pending     VDRL CSF- pending     Lyme:  negative, absence of detectable Borrelia Burdorferi antibodies.     CSF 12/7/18:  RBC 4038  WBC 14  Glucose 74  Protein 136     Immunofixation 11/17/18  Mildly elevated IgA, otherwise unremarkable    CBC RESULTS:   Lab Results   Component Value Date    WBC 5.5 12/10/2018     Lab Results   Component Value Date    RBC 5.07 12/10/2018     Lab Results   Component Value Date    HGB 14.6 12/10/2018     Lab Results   Component Value Date    HCT 44.3 12/10/2018     No components found for: MCT  Lab Results   Component Value Date    MCV 87 12/10/2018     Lab Results   Component Value Date    MCH 28.8 12/10/2018     Lab Results   Component Value Date    MCHC 33.0 12/10/2018     Lab Results   Component Value Date    RDW 13.4 12/10/2018     Lab Results   Component Value Date     12/10/2018     Last Comprehensive Metabolic Panel:  Sodium   Date Value Ref Range Status   12/12/2018 136 133 - 144 mmol/L Final     Potassium   Date Value Ref Range Status   12/12/2018 3.9 3.4 - 5.3 mmol/L Final     Chloride   Date Value Ref Range Status   12/12/2018 102 94 - 109 mmol/L Final     Carbon Dioxide   Date Value Ref Range Status   12/12/2018 27 20 - 32 mmol/L Final     Anion Gap   Date Value Ref Range Status   12/12/2018 6 3 - 14 mmol/L Final     Glucose   Date Value Ref Range Status   12/12/2018 133 (H) 70 - 99 mg/dL Final     Urea Nitrogen   Date Value Ref Range Status   12/12/2018 14 7 - 30 mg/dL Final     Creatinine   Date Value Ref Range  Status   12/12/2018 0.62 (L) 0.66 - 1.25 mg/dL Final     GFR Estimate   Date Value Ref Range Status   12/12/2018 >90 >60 mL/min/1.7m2 Final     Comment:     Non  GFR Calc     Calcium   Date Value Ref Range Status   12/12/2018 9.5 8.5 - 10.1 mg/dL Final         Lab Results   Component Value Date    TSH 7.42 12/06/2018         A/P  Ry Churchill is a 63 year old Left handed male with a PMH significant for HTN and thyroid disorder. He presented 12/6/18 with progressive lower extremity weakness, specifically R > L foot drop, and difficulty/weakness with gripping for approximately 15 months.  His workup thus far has included EMG which showed wide spread denervation, but no clear evidence of inflammatory neuropathy. Pt also has received a LP and results were notable for increased protein (180); however, this was noted to be a traumatic/bloody tap with pink/cloudy fluid. Mr. Churchill has also received brain & lumbar MRI without myelopathy or brain lesions. On neurologic exam it is notable for absent reflexes in the lower extremities (preserved upper extremity reflexes), up going left toe, and decreased sensation to pinprick and temperature on the RLE.     Differential diagnoses at this time include ALS (limb onset) vs. CIDP vs. MMN among others. Given the increased protein in the CSF ALS is less likely as we would expect normaL protein; however, this was a former traumatic tap and is being repeated. New information provided by family suggests behavioral or personality changes which could either be secondary to a disease process or to the frustration of his worsening disability.   CIDP or other inflammatory neuropathy would be supported if the LP shows an elevated protein (>60 mg/dL) and a normal WBC count.     #Progressive weakness, distal LE predominant  #Areflexic lower extremities  #Sensory loss, RLE  Patient is admitted to general neurology service for further workup and treatment of presumed  immuno-mediated peripheral neuropathy  - Antibody screening for contactin 1 and neurofascion 155 pending  - formal  strength assessment with dynameter, will reassess before discharge this week as IVIG is finishing  - HIV antibodies   - Sensorimotor neuropathy panel (includes GM1 and MAG antibodies) pending  - Lead blood level wnl <2.0  - CSF with glucose, protein, cell count, lyme, VDRL, bands obtained (notes above)  - 5 days of IVIG, 40 g/ day started 12/6   - (this is 2g/kg distributed over 5 days. Ideal body weight for patient calculated to be  102kg, rounded gives 40 g/day)  - Repeat  dynameter on 12/10 to assess for change since IVIG (not clearly documented by PT)    - Right sural nerve biopsy planned for 12/10 accomplished 12/11 with 3 cm biopsy, 1 cm segment fixed in formalin and 2 cm segment fresh in saline soaked gauze on ice. This will be sent to [facility] for analysis- they have been notified and will schedule a  once biopsy is complete. They can be reached at [phone].      #Pain  Patient currently does not report any pain  - PRN acetaminophen     #Chronic problems:   -Continue PTA Alphagan ophthalmic solution  -Continue PTA Cosopt ophthalmic solution  -Continue PTA latanoprost ophthalmic solution  - HTN-continue PTA hydrochlorothiazide 25 and lisinopril 40  -Hypothyroidism- continue PTA levothyroxine    DVT Prophylaxis: Patient has had doses on 12/7 and 12/8, then it will discontinue in preparation for biopsy on 12/10. Please re-order once patient has completed biopsy.       Code status: full  Activity: ad reji with assistance   Consults: PT/OT/EMG  Lines: iv access  Diet: regular after procedure  Dispo: PT recommending ARU, patient is medically clear to discharge    Pt seen and discussed with Dr. Dee.         Red Das MD  PGY2

## 2018-12-12 NOTE — CONSULTS
[facility]   PM&R CONSULT    Consulting Provider: Dr. Thapa  Reason for Consult: Assessment of rehabilitation   Location of Patient: 6A 6207  Date of Encounter: 12/12/2018     Attending's note   Patient seen and examined   He was accompanied by his wife and his daughter (who was on the speaker phone).   Ry Churchill is a 63 year old male who presents with quadriparesis of unknown etiology. He continues to undergo work up, some results are pending.   He has PMH of lumbar stenosis.   Differential diagnosis is CIDP vs Motor neuron disease and ALS     I reviewed the PT notes thoroughly and discussed his participation,. Despite the fact that the etiology of his weakness is unknown, he is progressing in his function.     He lives in [place] with his wife in a split level home   However they plan to move to the  in an accessable apartment. He has good support in his wife and daughter who live in the , and are available as needed.     I had a long discussion with the patient and the wife regarding the rehabilitation, and expectations at the ARU. They informed me that they would like the ARU at [place], which I coordinated the information to the team.       Thank you for consulting the PM&R Department.   For any questions, please feel free to page me at [phone]       Mirtha Rainey MD   Department of PM&R         ASSESSMENT/PLAN:    Mr. Ry Churchill is a 63 year old yo male with a PMH including lumbar stenosis with progressive lower >upper extremity weakness for ~ one year. Questionable exacerbation over the last two weeks with a number of falls when negotiating stairs at home. Increasingly more clumsy and weak. Diagnosis still not clear, pointing to CIDP vs Motor neuron disease and ALS a possibility per neurology.  Sural nerve biopsy pending.     Rehab diagnosis: upper and lower extremity weakness    Functional deficits: impaired mobility and ADLs.  "Lower > upper extremity weakness,  Questionable difficulty swallowing, no obvious bowel or bladder concerns. Cognition near baseline which family states is mildly impaired with word findings issues.     Co-morbidities: dyspnea at rest and with minimal exertion.     Recommendations:   Without a clear diagnosis it is not certain he will make enough gains to return home where he will have to negotiate multiple stairs.  It is also not clear whether he would be able to tolerate the intensity of acute inpatient rehab at this point.    We will continue to follow with you.     1. Cough and SOB: increased mucous production requiring the patient to spit up in bedside cup for some time. patient and family state extensive cardiopulmonary workup has been accomplished. Not clear per chart review. The patient did state he was having some difficulty with swallow. Would recommend SLP evaluation if able before discharge to assess needs and ensure no bulbar symptoms are present.     2. Would also recommend evaluating pulmonary function test prior to discharge given complaints of difficulty breathing this evening.     Continue PT/OT/SLP    ----------------------------------------------------------------------------------------------------------------  HPI:  (from H&P dated 12/06/18)   63 year old male who presents with leg weakness that began over 1 year ago with \"Right foot slapping\" noted by his wife that progressed over a period of months. He was assessed at [place] with EMG that identified a right radiculopathy. 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 He has a decompression of L1-2, right sided L5-S1 foraminal procedure as well. This had no effect on his symptoms and the leg weakness with foot drop continued to progress. During 11/15 neurology outpatient appointment he describes using a walker within his home, wheelchair when out of the house.      He denies bowel or bladder symptoms, no difficulty swallowing, " hearing, difficulty with speech or vision, no ptosis or diurnal variation in weakness. He was sent here as a direct admission from Dr. Thapa due to concern for his worsening weakness.     His workup so far has included a lumbar puncture performed on 11/29 which was notable for a protein of 186, however the tap was traumatic.  Because of this some other tests such as oligoclonal bands in the protein itself was difficult to fully differentiate.  EMG was performed 11/21 and showed generalized dysfunction of lower motor neurons in the cervical, thoracic, and lumbosacral segments.  Sensory responses were also affected, but of limited clinical significance.     Interval history: per our discussion today the patient is still quite weak without obvious gains. He reports getting SOB even at rest and with very minimal exertion. Denies CP, abdominal pain, issues with bowel or bladder. No obvious cognitive concerns but family does state he's been having word findings issues which are not new.       PREVIOUS LEVEL OF FUNCTION:   Modified indep with walker and rollator. Some help with ADLs at home.  Multiple recent falls.       CURRENT FUNCTION:   PT - Pt performs bed mobility and supine>sit from relatively flat bed with supervision. Required A to kylie shoes and R AFO. Pt completed numerous STSs with strong UE support and CGA-min A. Ambulated 3 x 10-15' with FWW, min A and close wc follow. Extended rest break between bouts. AVSS on RA.     OT - Completed  strength assessment, R hand: 77lbs, normal range for R hand: 99.1lbs, L hand: 85lbs, normal range for L hand: 76.5. (from 30's around time of admission) Pt educated on various AE for showering such as tub bench. Pt currently unable to navigate step over ledge in shower safely. Pt completed BUE exercises with weights in room 2x10 with rest break between each. Pt completed x4 sit<>stand Hoa + FWW and weight shifting activity while in standing. Pt tolerated standing ~45-60  "seconds per bout. VCs needed for safe method of completing sit<>stand, pt with tendency to \"plop\" into chair. VSS on RA throughout.     SLP - not evaluated    SOCIAL HISTORY:  Smoke: former chew   EtOH: daily drink at night, social   Drugs: denies     Relationship:  Home:  wife at home at all times. Living in [place], [place] in split level home. Seven stairs to bed/bath, no stairs to enter.   Support: wife at home. Two grown children in [facility]   Career/edu: former boil maker, now retired.      PAST MEDICAL HISTORY:  Lumbar stenosis     CURRENT MEDICATIONS:  Current Facility-Administered Medications   Medication     acetaminophen (TYLENOL) tablet 650 mg     brimonidine (ALPHAGAN) 0.2 % ophthalmic solution 1 drop     glucose gel 15-30 g    Or     dextrose 50 % injection 25-50 mL    Or     glucagon injection 1 mg     dorzolamide-timolol (COSOPT) ophthalmic solution 1 drop     hydrochlorothiazide (HYDRODIURIL) tablet 25 mg     latanoprost (XALATAN) 0.005 % ophthalmic solution 1 drop     levothyroxine (SYNTHROID/LEVOTHROID) tablet 125 mcg     lisinopril (PRINIVIL/ZESTRIL) tablet 40 mg     magnesium sulfate 4 g in 100 mL sterile water (premade)     melatonin tablet 1 mg     naloxone (NARCAN) injection 0.1-0.4 mg     ondansetron (ZOFRAN-ODT) ODT tab 4 mg    Or     ondansetron (ZOFRAN) injection 4 mg     potassium chloride (KLOR-CON) Packet 20-40 mEq     potassium chloride 10 mEq in 100 mL intermittent infusion with 10 mg lidocaine     potassium chloride 10 mEq in 100 mL sterile water intermittent infusion (premix)     potassium chloride 20 mEq in 50 mL intermittent infusion     potassium chloride ER (K-DUR/KLOR-CON M) CR tablet 20-40 mEq     potassium phosphate 15 mmol in D5W 250 mL intermittent infusion     potassium phosphate 20 mmol in D5W 250 mL intermittent infusion     potassium phosphate 20 mmol in D5W 500 mL intermittent infusion     potassium phosphate 25 mmol in D5W 500 mL intermittent infusion     " prochlorperazine (COMPAZINE) injection 5 mg    Or     prochlorperazine (COMPAZINE) tablet 5 mg    Or     prochlorperazine (COMPAZINE) Suppository 12.5 mg       REVIEW OF SYSTEMS:  10 point ROS per HPI, otherwise unrevealing.     LABS:   Lab Results   Component Value Date    WBC 6.4 12/12/2018    HGB 15.8 12/12/2018    HCT 47.7 12/12/2018    MCV 88 12/12/2018     12/12/2018     Lab Results   Component Value Date     12/12/2018    POTASSIUM 3.9 12/12/2018    CHLORIDE 102 12/12/2018    CO2 27 12/12/2018     (H) 12/12/2018     Lab Results   Component Value Date    GFRESTIMATED >90 12/12/2018    GFRESTBLACK >90 12/12/2018     No results found for: AST, ALT, GGT, ALKPHOS, BILITOTAL, BILICONJ, BILIDIRECT, CHUN  Lab Results   Component Value Date    INR 1.07 12/07/2018     Lab Results   Component Value Date    BUN 14 12/12/2018    CR 0.62 (L) 12/12/2018       PHYSICAL EXAM:  Vitals:    12/12/18 0329 12/12/18 0715 12/12/18 0903 12/12/18 1609   BP: 120/68 (!) 151/95 (P) 143/89 (!) 159/103   BP Location: Right arm Left arm     Pulse: 57   72   Resp: 18 20  20   Temp: 96.5  F (35.8  C) 97.4  F (36.3  C)  98  F (36.7  C)   TempSrc: Oral Oral  Oral   SpO2: 96% 93%  91%   Weight:         Blood pressure (!) 159/103, pulse 72, temperature 98  F (36.7  C), temperature source Oral, resp. rate 20, weight 144.8 kg (319 lb 3.2 oz), SpO2 91 %.    GEN: NAD, seated in bedside chair  He is alert, appropriate, cooperative  Speech is comprehensible   HEENT: NCAT  RESPIRATORY: mild labored breathing at rest  No muscle atrophy noted  NEURO:   CRANIAL NERVES: Discs flat. Pupils equal, round and reactive to light.  Extraocular movements full. Visual fields full. Face moves symmetrically. Tongue midline. Normal near point convergence.    Sensation: sensation to light touch intact throughout   Strength: b/l wrist ext 4/5 otherwise 5/5 UE. LE right 1/5 hip flexion, 3/5 knee ext, 0/5 ankle dorsi/planter flexion. LE left 3+/5 hip  flexion, 4/5 knee ext/flex, 4+/5 ankle planter flex, 2/5 ankle dorsi flexion.    Tone: normal tone, no clonus, negative lee's b/l, equivocal toes b/l, absent reflexes.    Cognition: fund of knowledge and train of thought appropriate. Normal recall immediately and five minutes. Smooth months of year forward but refused backwards. Unable to perform serial digits of any kind.    EXT: edema bilaterally, chronic stasis changes, no ulcers.   PSYCH: flat affect. Normal mood    Thank you for the consult. Please do not hesitate calling with any questions or concerns.    Petros Pagan,   PM&R, PGY4  N  [phone] (pager)    Staffed with Dr. Rainey (PM&R)    Total of 70 min spent in this encounter, more than 50% in counseling and coordination.

## 2018-12-12 NOTE — PLAN OF CARE
/79   Pulse 72   Temp 98.6  F (37  C) (Oral)   Resp 20   Wt 144.8 kg (319 lb 3.2 oz)   SpO2 94%   BMI 45.80 kg/m      [id number]  VSS on RA, no s/s of distress. On [place], reading WNL. A/Ox4, pleasant and cooperative with cares. Pt's wife at bedside for most of shift. RLE elevated per MD orders, incision c/d/I (nerve biopsy site--results pending), liquid bandaged; pt still endorsing numbness in that extremity. Denied pain. Denied n/v--on regular diet. PIV SL. Up x1-2 assist; pivoting to commode. Voiding adequately, no BMs this shift. Pt resting quietly in bed, visited with family/friends most of shift. Will continue to monitor and continue POC/notify team as needed.

## 2018-12-12 NOTE — PLAN OF CARE
Discharge Planner PT   Patient plan for discharge: not discussed   Current status: Pt performs bed mobility and supine>sit from relatively flat bed with supervision. Required A to kylie shoes and R AFO. Pt completed numerous STSs with strong UE support and CGA-min A. Ambulated 3 x 10-15' with FWW, min A and close wc follow. Extended rest break between bouts. AVSS on RA.   Barriers to return to prior living situation: medical needs, current mobility, level of A, stairs,   Recommendations for discharge: ARU   Rationale for recommendations: Pt is well below baselines, has interdisciplinary needs, supportive family, motivated to work with therapies, and anticipate he would tolerate extended therapy times.        Entered by: Cheryl Arenas 12/12/2018 2:33 PM

## 2018-12-12 NOTE — PROGRESS NOTES
"Social Work: Assessment with Discharge Plan    Patient Name: Ry Churchill  : 1955  Age: 63 year old  MRN: [id number]  Risk/Complexity Score: 4  Completed assessment with: Patient, wife (Teri) and daughter (Leslie)    Presenting Information   Reason for Referral: Discharge Planning  Date of intake: 2018  Referral Source: Case Finding  Decision Maker: Patient  Alternate Decision Maker: Wife in absence of a Health Care Directive as per [facility] policy  Health Care Directive: Will bring in copy, pt's wife states that she has the HCD in the car and will bring it in this evening.  Copy to be placed in the chart.  Living Situation: Pt lives with his wife in a split entry home. Pt has a 11.5 year old pittman/ collie mix dog that is not a jumper.  There is a ramp to enter the home with a small lip at the entrance.  Laundry facility's are on the entry level.  From the entry, there are 7 steps up to the main floor (with handrail) where, bed, bath (tub/shower combo), LR, DR and Kitchen are located.  Pt does not go to the lower level of the home.    Previous Functional Status: Pt owns 2 rollerator's and he used these for mobility.  Pt has been able to climb the steps \"fairly easily\" and \"slowly.\"  Pt was indep with transfers and with completion of adl's.  Pt's wife assisted pt to put on shoes and socks.  Pt states that he got very weak 2 weeks ago impacting his left leg and arms, he also got tired. Pt states that prior to that, he was doing good.  Pt states that prior to 2018, he was using a cane for mobility.  Pt's wife provides pt with transport, manages the household finances and completes the IADL's.  Pt and wife have a privately hired  who comes to their home every 3 weeks.  Pt was indep with medication administration. Pt owns a ww, 2 rollator's, a reacher, lift chair, grab bars in the bathroom, toilet frame with arms, and a heightened toilet.  Pt has a disability parking " certificate. Pt was not utilizing [employer].    Patient and family understanding of hospitalization:  Pt and wife state that physicians are still uncertain of what is wrong with pt.  Cultural/Language/Spiritual Considerations: [employer].  Pt and wife indicate that their  visits them and brings them food  Adjustment to Illness: Appropriate for situation.    Physical Health  Reason for admission: No diagnosis found.  Services Needed/Recommended: Occupational and Physical Therapy are recommending an acute rehab stay.    Mental Health/Chemical Dependency:   Diagnosis: Not applicable  Support/Services in Place: Not applicable  Services Needed/Recommended: Not applicable    Support System  Significant Relationship at Present time:  Wife (Teri)  Family of Origin is available for support: Wife is available for support  Other support available:  Adult children:  1.  Leslie resides in [place] and Prabha resides in [place] [facility] [place].  Current in home services:   Gaps in Support System: None identified    Provider Information   Primary Care Physician:Chandrakant Batista      Clinic:       : None    Financial   Income Source: Social Security and Pension  Financial Concerns:  None indicated  Insurance: Medicare, Blue Cross      Discharge Plan   Patient and family discharge goal: Acute rehab placement followed by return to home  Provided Education on discharge plan: YES  Patient agreeable to discharge plan:  YES  A list of Medicare Certified Facilities was provided to the patient and/or family to encourage patient choice. Patient's choices for facility are: In order of preference:  1.  [facility]  2.  [facility] [place]  3.  [facility] provide Skilled rehabilitation or complex medical:  YES  General information regarding anticipated insurance coverage and possible out of pocket cost was discussed. Patient and patient's family are aware patient may incur  the cost of transportation to the facility, pending insurance payment: YES  Barriers to discharge: None, Dr. Das has confirmed readiness for discharge.    Discharge Recommendations   Disposition: Acute rehab placement      Additional comments   SIMON phoned Admissions at [facility] ([facility]), referred pt and informed of readiness for discharge.  [facility] is unsure of bed availability for admit tomorrow.  Pt is one of 4 waiting for placement at [facility].    SIMON phoned Admissions for [facility]), referred pt and faxed assessment materials.     SIMON updated Dr. Das.     SIMON will follow for discharge planning.    NATASHA Knott  Social Work, 6A  Phone:  [phone]  Pager:  [phone]  12/12/2018

## 2018-12-12 NOTE — PLAN OF CARE
"OT6A  Discharge Planner OT   Patient plan for discharge: ARU  Current status: Completed  strength assessment, R hand: 77lbs, normal range for R hand: 99.1lbs, L hand: 85lbs, normal range for L hand: 76.5. Pt educated on various AE for showering such as tub bench. Pt currently unable to navigate step over ledge in shower safely. Pt completed BUE exercises with weights in room 2x10 with rest break between each. Pt completed x4 sit<>stand Hoa + FWW and weight shifting activity while in standing. Pt tolerated standing ~45-60 seconds per bout. VCs needed for safe method of completing sit<>stand, pt with tendency to \"plop\" into chair. VSS on RA throughout.   Barriers to return to prior living situation: medical status, pt below baseline level of functional mobility and I/ADLs, weakness, poor activity tolerance   Recommendations for discharge: ARU  Rationale for recommendations: Pt would benefit from continued participation in intensive PT/OT to progress safety and IND with I/ALDs and functional mobility, facilitate return to PLOF        Entered by: Vira John 12/12/2018 1:08 PM       "

## 2018-12-12 NOTE — PLAN OF CARE
Admit for increasing BLE weakness. RLE biopsy site liquid bandaged, approximated, foot edematous, dorsalis pedis pulses +2. Denies pain. Neuros intact ex RLE 1/5 with numbness present. LLE 4/5. Alert and oriented x4. VSS on 1 L NC overnight SAT mid 90s. Capnography WNL. PIV SL. Regular diet. Voids without difficulty. Has not been OOB since surgery. Continue to monitor.

## 2018-12-13 NOTE — PLAN OF CARE
Discharge Planner OT   6A  Patient plan for discharge: ARU  Current status: Pt very motivated, facilitated shower and dressing tasks with assist and AE.  Pt continues to be below his baseline and unable to complete house hold mobility and stairs to enter home although is making progress.  Today transferred to and from bathroom using walker CGA.   Barriers to return to prior living situation: fall risk, weakness, pain  Recommendations for discharge: ARU  Rationale for recommendations: Pt presents with new deficits including weakness, precautions leading to decreased function and safety. Pt unable to complete transfers needed for home or stairs. Needs to achieve  Mod IND/Min A with walker and 7 stairs to return home. Pt will benefit from intensive, interdisciplinary ARU to address these deficits and to provide caregiver training to facilitate a safe dc to home.            Entered by: Shira Bonilla 12/13/2018 1:58 PM

## 2018-12-13 NOTE — PROGRESS NOTES
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  EMG suggestive of motor neuron disease, however CSF consistent with inflammatory neuropathy.  Contactin-1 ab's and Neurofascin antibodies pending.  Contactin 1 in particular can present with early axon loss.   Doing IVIG trial and plan for biopsy by general surgery today.  Therapies evaluating for rehab status, recommending ARU                                 SUBJECTIVE:  - No change in weakness or numbness, patient is able to sleep throughout the night.       OBJECTIVE:   Vital Signs:   /84   Pulse 60   Temp 97.1  F (36.2  C) (Oral)   Resp 20   Wt 144.8 kg (319 lb 3.2 oz)   SpO2 96%   BMI 45.80 kg/m      PHYSICAL EXAMINATION:  General: adult in NAD, cooperative. Very pleasant and dressed in his own attire. Well groomed.    HEENT: NC/AT. Moist mucus membranes, pink, and no oral lesions noted.   Lungs: No respiratory distress  Heart: Regular rate  Extremities: No edema, no cyanosis.  -----------  Mental Status: A&O x3, cooperative and interacting appropriately. No difficulty following commands. Speech fluent, clear and coherent.   Cranial nerves: PERRL, EOMI. Facial movements symmetric. Uvula midline, palate elevation symmetric. Tongue protrusion midline with full lateral motion.   Motor: Tone normal. No atrophy or fasciculations. Muscle bulk symmetric and appropriate. No tremors or other involuntary movements observed. No pronator Drift.      Wrist extension Elbow flex/ext  Hip flex  Knee flex/ext  dorsiflexion Plantarflexion    Right 5 5 2 2 0 2   Left 5 5 3 5 4 4      strength tested by dynameter 12/10 (test pending today)   - Left hand: average 35 kg (of 2 tests)   - right hand: average 31 kg (of 2 tests)    See OT note for updated  testing      Reflexes: No reflexes elicited at the achilles, patellar, or semi-tendinosis bilaterally. Left toe is upgoing. Right toe mute. Normal to slightly brisk and symmetrical in upper extremities   Sensory: decreased sensation to pinprick in RLE below knee.   Coordination:  FNF with no dysmetria.   Station and Gait: unable to assess due to the inability to bear weight with his legs.      PERTINENT INVESTIGATIONS:     neurofascion-155, contactin-1 send out to [facility]- pending     VDRL CSF- pending     Lyme:  negative, absence of detectable Borrelia Burdorferi antibodies.     CSF 12/7/18:  RBC 4038  WBC 14  Glucose 74  Protein 136     Immunofixation 11/17/18  Mildly elevated IgA, otherwise unremarkable    CBC RESULTS:   Lab Results   Component Value Date    WBC 5.5 12/10/2018     Lab Results   Component Value Date    RBC 5.07 12/10/2018     Lab Results   Component Value Date    HGB 14.6 12/10/2018     Lab Results   Component Value Date    HCT 44.3 12/10/2018     No components found for: MCT  Lab Results   Component Value Date    MCV 87 12/10/2018     Lab Results   Component Value Date    MCH 28.8 12/10/2018     Lab Results   Component Value Date    MCHC 33.0 12/10/2018     Lab Results   Component Value Date    RDW 13.4 12/10/2018     Lab Results   Component Value Date     12/10/2018     Last Comprehensive Metabolic Panel:  Sodium   Date Value Ref Range Status   12/12/2018 136 133 - 144 mmol/L Final     Potassium   Date Value Ref Range Status   12/12/2018 3.9 3.4 - 5.3 mmol/L Final     Chloride   Date Value Ref Range Status   12/12/2018 102 94 - 109 mmol/L Final     Carbon Dioxide   Date Value Ref Range Status   12/12/2018 27 20 - 32 mmol/L Final     Anion Gap   Date Value Ref Range Status   12/12/2018 6 3 - 14 mmol/L Final     Glucose   Date Value Ref Range Status   12/12/2018 133 (H) 70 - 99 mg/dL Final     Urea Nitrogen   Date Value Ref Range Status   12/12/2018 14 7 - 30 mg/dL Final     Creatinine    Date Value Ref Range Status   12/12/2018 0.62 (L) 0.66 - 1.25 mg/dL Final     GFR Estimate   Date Value Ref Range Status   12/12/2018 >90 >60 mL/min/1.7m2 Final     Comment:     Non  GFR Calc     Calcium   Date Value Ref Range Status   12/12/2018 9.5 8.5 - 10.1 mg/dL Final         Lab Results   Component Value Date    TSH 7.42 12/06/2018         A/P  Ry Churchill is a 63 year old Left handed male with a PMH significant for HTN and thyroid disorder. He presented 12/6/18 with progressive lower extremity weakness, specifically R > L foot drop, and difficulty/weakness with gripping for approximately 15 months.  His workup thus far has included EMG which showed wide spread denervation, but no clear evidence of inflammatory neuropathy. Pt also has received a LP and results were notable for increased protein (180); however, this was noted to be a traumatic/bloody tap with pink/cloudy fluid. Mr. Churchill has also received brain & lumbar MRI without myelopathy or brain lesions. On neurologic exam it is notable for absent reflexes in the lower extremities (preserved upper extremity reflexes), up going left toe, and decreased sensation to pinprick and temperature on the RLE.     Differential diagnoses at this time include ALS (limb onset) vs. CIDP vs. MMN among others. Given the increased protein in the CSF ALS is less likely as we would expect normaL protein; however, this was a former traumatic tap and is being repeated. New information provided by family suggests behavioral or personality changes which could either be secondary to a disease process or to the frustration of his worsening disability.   CIDP or other inflammatory neuropathy would be supported if the LP shows an elevated protein (>60 mg/dL) and a normal WBC count.     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 Ideal body weight for patient calculated to be  102kg, rounded gives 40 g/day)  - Repeat  dynameter on 12/10 to assess for change since IVIG (not clearly documented by PT)    - Right sural nerve biopsy planned for 12/10 accomplished 12/11 with 3 cm biopsy, 1 cm segment fixed in formalin and 2 cm segment fresh in saline soaked gauze on ice. This will be sent to [facility] for analysis- they have been notified and will schedule a  once biopsy is complete. They can be reached at [phone].      #Pain  Patient currently does not report any pain  - PRN acetaminophen     #Chronic problems:   -Continue PTA Alphagan ophthalmic solution  -Continue PTA Cosopt ophthalmic solution  -Continue PTA latanoprost ophthalmic solution  - HTN-continue PTA hydrochlorothiazide 25 and lisinopril 40  -Hypothyroidism- continue PTA levothyroxine    DVT Prophylaxis: Patient has had doses on 12/7 and 12/8, then it will discontinue in preparation for biopsy on 12/10. Please re-order once patient has completed biopsy.       Code status: full  Activity: ad reji with assistance   Consults: PT/OT/EMG  Lines: iv access  Diet: regular after procedure  Dispo: PT recommending ARU, patient is medically clear to discharge    Pt seen and discussed with Dr. Dee.         Red Das MD  PGY2

## 2018-12-13 NOTE — PROGRESS NOTES
Social Work Services Discharge Note      Patient Name:  Ry Churchill     Anticipated Discharge Date:  12/14/18    Discharge Disposition:   [facility] ([phone])    Following MD:  Facility Assignment     Pre-Admission Screening (PAS) online form has been completed.  The Level of Care (LOC) is:  Determined  Confirmation Code is:  Not required as pt is admitting to ARU.       Additional Services/Equipment Arranged:  SW confirmed readiness for discharge with Dr. Das.  [facility] (Chelsie). SIMON arranged for [email or web address] ([place] [phone]) to provide w/c transport at 12 noon on 12/14/18.     Patient / Family response to discharge plan:  Pt, wife and daughter (Leslie) voice understanding of the discharge plan and agreement with the discharge plan.     Persons notified of above discharge plan:  Pt, wife, daughter (Leslie), Dr. Das and 6A nursing    Staff Discharge Instructions:  Please fax discharge orders and signed hard scripts for any controlled substances.  Please print a packet and send with patient.     CTS Handoff completed:  YES    Medicare Notice of Rights provided to the patient/family:  YES      NATASHA Knott  Social Work, 6A  Phone:  [phone]  Pager:  [phone]  12/13/2018

## 2018-12-13 NOTE — PLAN OF CARE
Status: Pt on 6a for increasing BLE weakness.   VS: Stable on RA  Neuros: A&Ox4. BUE 5/5, RLE 1/5, LLE 3/5. Absent dorsi/plantar on R. N to R foot. Moderate dorsi/plantar on L.   GI: Reg diet, tolerating well.   : Voids spontaneously without difficulty.   IV: PIV SL.  Activity: Up w/ 2, GB, Walker. Follow with wheelchair in halls. Patient practicing exercises from PT in room independently.   Pain: Denies.  Skin: R ankle incision CDI with liquid bandage.   Social: Family present part of evening, very supportive.   Plan of Care: Anticipate d/c to ARU once placement is secured. Continue to monitor and follow POC.

## 2018-12-13 NOTE — PLAN OF CARE
/84   Pulse 60   Temp 97.1  F (36.2  C) (Oral)   Resp 20   Wt 144.8 kg (319 lb 3.2 oz)   SpO2 96%   BMI 45.80 kg/m    Status: Pt on 6a for increasing BLE weakness  VS: Stable on RA  Neuro: A&Ox4. BUE 5/5, RLE 1/5, LLE 3/5. Absent dorsi/plantar on R.  GI: Reg diet, tolerating well  : Voids spontaneously without difficulty  IV: PIV SL  Skin: R ankle incision CDI with liquid bandage. Scant blood noted  Pain: Denies  Activity: Up w/ 2, GB, Walker. Follow with wheelchair in halls. Patient practicing exercises from PT in room independently.   See flow sheets for further details and assessments.  Plan of Care: Potential discharge to ARU if deemed able to participate with therapy for 3h a day. Continue to monitor, notify MD of significant changes.

## 2018-12-13 NOTE — PROGRESS NOTES
Social Work Services Progress Note    Hospital Day: 8  Date of Initial Social Work Evaluation:  12/12/18  Collaborated with:  Admissions at [facility] ([facility])    Data:  SIMON is following for acute rehab placement.      Intervention:  SIMON phoned Admissions at [facility] to inquire as to whether or not they can accept pt for admit today.  SIMON received a return call from [facility] indicates that pt was seen by PMR on 12/12/18 and PMR recommended TCU.  Chelsie states that she will review and determine if they will accept pt to ARU in spite of PMR recommendations for TCU.  Chelsie anticipates a decision by 10am. Chelsie does not anticipate bed availability today.     Assessment:  OT and Physical Therapy recommend ARU and PMR recommends TCU.    Plan:    Anticipated Disposition:  ARU vs TCU    Barriers to d/c plan:  Admissions at [facility] to review to determine if they will accept pt to ARU even through PMR is recommending TCU.    Follow Up:  SIMON will continue to follow.    NATASHA Knott  Social Work, 6A  Phone:  [phone]  Pager:  [phone]  12/13/2018

## 2018-12-13 NOTE — PROGRESS NOTES
CLINICAL NUTRITION SERVICES - BRIEF NOTE    Reviewed nutrition risk factors due to LOS. Pt is tolerating diet, eating well per nursing documentation; 100% PO. No nutrition issues identified at this time. RD will follow via rounds at this time, unless consulted.     Amol Chowdary RD, LD, [facility]  Neuro ICU  Pager: [phone]

## 2018-12-13 NOTE — PROGRESS NOTES
Social Work Services Progress Note    Hospital Day: 8  Date of Initial Social Work Evaluation:  12/12/18  Collaborated with:  SIMON left a message for Admissions (Chelsie) at [facility] to call.    Data:  SW is following for acute rehab placement.    Intervention:  SW phoned Admissions (Chelsie) at [facility] and left a message for Chelsie to call in regards to whether or not they can accept pt for admit today.      Assessment: Pt is agreeable to acute rehab placement.    Plan:    Anticipated Disposition:  Acute rehab placement    Barriers to d/c plan:  Await update from Admissions at [facility] in regards to whether or not they can accept pt for admit today.    Follow Up:  SIMON will continue to follow.    NATASHA Knott  Social Work, 6A  Phone:  [phone]  Pager:  [phone]  12/13/2018

## 2018-12-13 NOTE — PROGRESS NOTES
Social Work Services Progress Note    Hospital Day: 8  Date of Initial Social Work Evaluation:  12/12/18  Collaborated with:  Dr. Das (Neurology), Dr. Rainey (PMR), [facility] Admissions    Data:  Pt is awaiting rehab placement.      Intervention:  SIMON spoke with Dr. Das who confirmed readiness for discharge.  SIMON spoke with Dr. Washington who states that her resident was in error when he recommended TCU on 12/12/18.  Dr. Rainey recommends ARU.  SW spoke with Admissions at [facility] ([facility]) and they do not have staffing to accept pt for admit today. [facility] as requested that SW arrange transport to take place on 12/14/18 at 12 noon.    Assessment:  ARU is recommended.    Plan:    Anticipated Disposition:  Acute rehab placement at [facility]    Barriers to d/c plan:  [facility] is not able to take pt for admit today due to staffing.    Follow Up:  SW will coordinate discharge.    NATASHA Knott  Social Work, 6A  Phone:  [phone]  Pager:  [phone]  12/13/2018

## 2018-12-13 NOTE — PLAN OF CARE
Discharge Planner PT 6A  Patient plan for discharge: ARU  Current status: Pt amb ~ 45 feet x 2 with WW and CGA, pt required 2 standing rest breaks and 1 seated rest break during gait training. Pt performed sit->stand trials x 3 with CGA.   Barriers to return to prior living situation: decreased strength, activity intolerance, stairs  Recommendations for discharge: ARU  Rationale for recommendations: Pt is highly motivated, has excellent family support, and demonstrates skilled need for PT and OT services, and would be able to tolerate 3 hours of therapy per day.       Entered by: Felisa Raza 12/13/2018 2:44 PM

## 2018-12-14 PROBLEM — R53.1 WEAKNESS: Status: ACTIVE | Noted: 2018-01-01

## 2018-12-14 NOTE — PLAN OF CARE
Physical Therapy Discharge Summary    Reason for therapy discharge:    Discharged to acute rehabilitation facility.    Progress towards therapy goal(s). See goals on Care Plan in [facility] electronic health record for goal details.  Goals not met.  Barriers to achieving goals:   discharge from facility.    Therapy recommendation(s):    Continued therapy is recommended.  Rationale/Recommendations:  Pt remains at falls risk and below baseline for mobility.  Recommend continued gait and balance training, strength training all to increase (I) functional mobility.

## 2018-12-14 NOTE — OP NOTE
Procedure Date: 12/11/2018      PREOPERATIVE DIAGNOSIS:  Leg and muscle weakness.      POSTOPERATIVE DIAGNOSIS:  Leg and muscle weakness.      PROCEDURE:  Right sural nerve biopsy.      STAFF SURGEON:  Rufino Lopez MD      RESIDENT SURGEON:  Brianne Hamilton MD      CLINICAL HISTORY:  Ry Churchill is a 63-year-old male who presents with progressive and persistent weakness.  The patient's neurologist has requested a nerve biopsy.  The patient's right leg is more involved by the process of weakness than the left leg, and for this reason, a right sural nerve biopsy was selected.  The patient understood the risks and benefits of surgery, including the potential for injury to the intrasubcutaneous tissues of the right leg, including muscle, vein, artery, and nerve in the area of the right leg.  The patient also understood the risks and benefits of surgery, such as infection, bleeding, hematoma, seroma, and all the anesthetic complications possible, including myocardial infarction, pulmonary emboli, stroke, and even death in the operating room.  The patient understood these risks and wished to proceed.      DESCRIPTION OF PROCEDURE:  Ry Churchill was taken to the main operating room, placed in the left lateral decubitus position, and the right leg was prepped in a circumferential basis.  After the prepping and draping, the timeout procedure was performed to assure patient identification and procedure.  Sterile ultrasound transducer was used to identify the artery and nerve of the right lateral ankle.  These structures were visible using the ultrasound and the incision was appropriately marked.  A 50:50 mixture of 1% lidocaine and 0.25% Marcaine were instilled in the area of the subcutaneous tissue and the dermis of the right lateral ankle and a manner to avoid deep injectoin into the area of the nerve. A linear longitudinal  incision was made with a #15 blade and taken down to the level of subcutaneous tissue.   With blunt dissection, the sural nerve was identified, dissected free from associated neurovascular bundle of tissues, and the sural nerve was then brought up to the wound using yellow vessel loops.  Following this, a 5-0 Prolene suture was placed at the edge of the nerve proximally and distally, and a longitudinal section of the right sural nerve was removed lengthwise, approximately 3 cm in length using an 11 blade.  At this point, the nerve was brought up to the back table.  A 1 cm piece was passed off for processing in neuro-pathology laboratory and a 2 cm piece of nerve was passed off for glutaraldehyde fixation as per protocol.  Attention was directed to the hemostasis, which was excellent.  The lateral and deep aspects of the wound was then infiltrated with a 50:50 mixture of 1% lidocaine, 0.25% Marcaine, and the subcutaneous tissue was reapproximated with interrupted 3-0 Vicryl sutures.  The dermal layer was reapproximated with interrupted 3-0 Vicryl suture, and the skin edges were reapproximated with a running 4-0 Monocryl suture and skin sealant.  The patient tolerated the procedure well.  Needle and sponge count was correct x 2 and the patient was returned to postanesthesia recovery in good condition.         CLEVE HERNANDEZ MD             D: 2018   T: 2018   MT: RONIT      Name:     LAURA POWERS   MRN:      [id number]        Account:        [id number]   :      1955           Procedure Date: 2018      Document: [id number]

## 2018-12-14 NOTE — PLAN OF CARE
/78   Pulse 54   Temp 96  F (35.6  C) (Oral)   Resp 16   Wt 144.8 kg (319 lb 3.2 oz)   SpO2 97%   BMI 45.80 kg/m    Status: Pt on 6a for increasing BLE weakness.   VS: Stable on RA  Neuro: A&Ox4. BUE 5/5, RLE 1/5, LLE 3/5. Absent dorsi/plantar on R. Numbness to R foot. Moderate dorsi/plantar on L.   GI: Reg diet, tolerating well.   : Voids spontaneously without difficulty   IV: PIV SL  Skin: R ankle incision CDI with liquid bandage. R foot monisha.  Pain: denies  Activity: Up w/ 2, GB, Walker. Follow with wheelchair in halls. Patient practicing exercises from PT in room independently. Very motivated.  See flow sheets for further details and assessments.  Plan of Care: Discharge to [facility] tomorrow at noon, [facility] transport arranged--see SW note. Continue to monitor and follow POC.

## 2018-12-14 NOTE — PROGRESS NOTES
Patient is new to unit, arrived per transport at around 12:40pm.  Patient is alert/oriented x4 was able to transfer with Ao2 onto standing scale and then into bed, is weaker on the right side, wide walker brought to pt room.  Patient denies any pain, is regular diet and ordered late meal per self.  Patient's daughter at bedside and room orientation given with her at bedside.  No additional care concerns, continue with admission process.

## 2018-12-14 NOTE — PLAN OF CARE
Occupational Therapy Discharge Summary    Reason for therapy discharge:    Discharged to acute rehabilitation facility.    Progress towards therapy goal(s). See goals on Care Plan in [facility] electronic health record for goal details.  Goals not met.  Barriers to achieving goals:   limited tolerance for therapy and discharge from facility.    Therapy recommendation(s):    Continued therapy is recommended.  Rationale/Recommendations:  t presents with new deficits including weakness, precautions leading to decreased function and safety. Pt unable to complete transfers needed for home or stairs. Needs to achieve  Mod IND/Min A with walker and 7 stairs to return home. Pt will benefit from intensive, interdisciplinary ARU to address these deficits and to provide caregiver training to facilitate a safe dc to home. .

## 2018-12-14 NOTE — H&P
[facility]  Admission Note    Patient Name: Ry Churchill    YOB: 1955  MRN: [id number]     Age / Sex: 63 year old male    Admit Date: 12/14/2018    Reason for Admission: Weakness (LE>UE, R>L), unclear etiology, concern for ALS vs CIDP    History of Present Illness  Mr Churchill is a 64 y/o man with a PMH including HTN, hypothyroidism, and glaucoma who has had progressive lower extremity weakness (R>L), and well as  weakness x about 15 months. He was initially assessed at [facility]. An EMG identified a R radiculopathy and MRI revealed L1-2 stenosis and he actually underwent a decompression of L1-2 and R-sided L5-S1 foraminal procedure as well. However, his weakness continued to progress. He continued to be seen by neurology in 11/2018 without clear etiology found and during an appointment on 12/6/2018 with Dr. Thapa he had progressed so much that he was admitted directly for further work-up. EMG showed widespread denervation but no clear evidence of inflammatory neuropathy. LP showed increased protein but was a traumatic tap. Brain and lumbar MRI without lesions or myelopathy. Differential diagnosis included ALS vs CIDP vs MMN. Multiple antibody panels and R sural nerve biopsy (12/11) are currently pending. Pt did actually undergo treatment with 5 days of IVIG (12/6 - 12/10). Based on  strength testing with dynameter before and after it appears his strength may have improved after treatment.     During the acute hospitalization, patient was seen and evaluated by PT, OT and PM&R consult service. All specialties collectively recommended that the patient would benefit from ongoing therapies in the acute inpatient rehabilitation setting.     Functionally, the patient is CGA for transfers and is walking 45 feet with a wheeled walker at [facility]. He is max A for lower body dressing. Cognition is mildly impaired at baseline with word  finding issues. He is eating a regular diet.     On evaluation today, pt confirms the above history. He notes that he feels that his  strength did get better with the IVIG. He denies any sensory issues except for ongoing tingling to the bottom of the R foot and more recent (couple weeks) tingling to the bottom of the L foot. His daughter does note possibly more labored breathing since the onset of his weakness, mainly with transfers. No chest pain. No vision , speech, or swallow issues. Bowel regular. Voiding normally. He does not feel that his cognition has been affected. Social and functional history discussed below.     Currently, the patient is medically appropriate and is assessed to have needs and will benefit from an inpatient acute rehabilitation comprehensive program working with Physical and Occupational Therapist and will benefit from supervision and management of Rehab Nursing and Rehab MD.     Allergies  No Known Allergies    Past Medical and Surgical History  HTN  Hypothyroidism  Glaucoma    Past Surgical History:   Procedure Laterality Date     BACK SURGERY       KNEE SURGERY       PROCEDURE GRAFT SURAL NERVE Right 12/11/2018    Procedure: RIGHT LEG SURAL NERVE BIOPSY;  Surgeon: Rufino Lopez MD;  Location: [facility] OR       Family History  Pt denies any significant family history    Social History  The patient lives in [place], [place] with wife in a split level home. One small step to enter house. 7 steps to main floor.       Social History     Socioeconomic History     Marital status:      Spouse name: Not on file     Number of children: Not on file     Years of education: Not on file     Highest education level: Not on file   Social Needs     Financial resource strain: Not on file     Food insecurity - worry: Not on file     Food insecurity - inability: Not on file     Transportation needs - medical: Not on file     Transportation needs - non-medical: Not on file   Occupational History      "Not on file   Tobacco Use     Smoking status: Never Smoker     Smokeless tobacco: Never Used   Substance and Sexual Activity     Alcohol use: No     Drug use: No     Sexual activity: Not on file   Other Topics Concern     Parent/sibling w/ CABG, MI or angioplasty before 65F 55M? Not Asked   Social History Narrative     Not on file         Functional History:  Mobility: Used a 4WW for short distances. Needed a wheelchair (borrowed) for longer distances such as going to appointments but does not own one.     ADLs/iADLs: Modified indepdent except did need wife to help with donning socks/shoes     Review of Systems  10 point ROS neg other than the symptoms noted above.     Physical Examination  BP (!) 159/98 (BP Location: Left arm)   Pulse 60   Temp 96.5  F (35.8  C) (Oral)   Resp 16   Ht 1.778 m (5' 10\")   Wt 141.2 kg (311 lb 4.8 oz)   SpO2 96%   BMI 44.67 kg/m      General: Awake, alert. NAD.   HEENT: Normocephalic/Atraumatic. PERRL. EOM intact. Sclera white.   Cardiac: RRR.   Respiratory: Non-labored breathing on RA. Lungs CTAB.   Abdomen: Soft, nontender  Extremities: No bilateral lower extremity edema  Neurological:   Alert, awake. Converses appropriately. No dysarthria. Cognition superficially intact.   CN II-XII intact.   Strength: 5/5 throughout BUE, except ? Slightly weaker  (5-/5) compared to L. RLE <3/5 throughout except for almost (2+/3-) antigravity with KF. LLE: 3+/5 HF, 4+/5 KF/KE, 4-/5 DF/PF.    Sensory: Intact to light touch to bilateral upper and lower extremities  Reflexes: Normal reflexes to BUE. Difficult to elicit reflexes to BLE. Upgoing toe on L with Babinski, not on R. No clonus bilaterally.   Skin: Warm, dry  Psychiatric: Mood and affect appropriate to situation.         Labs  Lab Results   Component Value Date    WBC 7.4 12/14/2018         Lab Results   Component Value Date    RBC 5.21 12/14/2018     Lab Results   Component Value Date    HGB 15.1 12/14/2018     Lab Results "   Component Value Date    HCT 45.2 12/14/2018     No components found for: MCT  Lab Results   Component Value Date    MCV 87 12/14/2018     Lab Results   Component Value Date    MCH 29.0 12/14/2018     Lab Results   Component Value Date    MCHC 33.4 12/14/2018     Lab Results   Component Value Date    RDW 13.4 12/14/2018     Lab Results   Component Value Date     12/14/2018       Lab Results   Component Value Date     12/14/2018      Lab Results   Component Value Date    POTASSIUM 3.5 12/14/2018     Lab Results   Component Value Date    CHLORIDE 102 12/14/2018     Lab Results   Component Value Date    DAPHNEY 9.1 12/14/2018     Lab Results   Component Value Date    CO2 28 12/14/2018     Lab Results   Component Value Date    BUN 16 12/14/2018     Lab Results   Component Value Date    CR 0.60 12/14/2018     Lab Results   Component Value Date     12/14/2018         Medications  Current Facility-Administered Medications   Medication     acetaminophen (TYLENOL) tablet 325-975 mg     brimonidine (ALPHAGAN) 0.2 % ophthalmic solution 1 drop     dorzolamide-timolol (COSOPT) ophthalmic solution 1 drop     [START ON 12/15/2018] hydrochlorothiazide (HYDRODIURIL) tablet 25 mg     latanoprost (XALATAN) 0.005 % ophthalmic solution 1 drop     [START ON 12/15/2018] levothyroxine (SYNTHROID/LEVOTHROID) tablet 125 mcg     [START ON 12/15/2018] lisinopril (PRINIVIL/ZESTRIL) tablet 40 mg         ASSESSMENT / MANAGEMENT AND INITIATION OF PLAN OF CARE:    Mr Churchill is a 64 y/o man with who has had progressive lower extremity weakness (R>L) and decreased  strength x ~15 months with broad neurological work-up and unclear cause currently (possibly ALS vs CIDP vs MMN). Admitted to ARU 12/14/2018).     Functional Deficits: RLE>LLE weakness. Decreased  strength bilaterally (R>L)    Rehab admission code: 03.8 neuromuscular disorder      PT, OT 90 minutes of each on a daily basis, in addition to rehab nursing and close  management of physiatrist.     --Impairment of ADL's: OT for 90 minutes daily to work on ADL re-training such as grooming, self-cares, and bathing  -- Impairment of Mobility: PT for 90 minutes daily to work on neuromuscular re-education focusing on strength, balance, coordination, and endurance.   -- Rehab RN for med administration, bowel regimen, glucose monitoring, and wound care  -- Adjustment to disability: Health psychology consult. Discussed with pt. Very anxious about possible diagnoses and prognosis.     Medical Management:    #Progressive Weakness (RLE>LLE), decreased  strength: Has had broad work-up, including initial lumbar surgery without improvement, EMG, and LP with no clear cause (possibly ALS vs CIDP vs MMN). Received IVIG (12/6 - 12/10) with possibly some improvement in  strength  -- Sural nerve biopsy, 12/11. Results pending  -- Multiple antibody panels pending  -- Follow-up with neuromuscular clinic after rehab    #HTN:  -- Continue PTA Lisinopril, hydrochlorothiazide    #Hypothyroidism:  -- Continue PTA Synthroid    #Glaucoma:  -- Continue PTA eyedrops       Diet: Regular  Bladder: PVRs x 2  Bowel: LBM 12/14 per pt. PRN medications available  DVT ppx: SCDs.   Code Status: Full, discussed on admission    Prognosis for medical improvement and stabilization of the medical issues for discharge to home with support of wife is good. 7 steps to main floor will be a challenge    Estimated Length of Stay: 7-10 days    Follow up Appointments on Discharge:  Neuromuscular clinic  PCP      Patient staffed with Dr. Beck, who agrees with plan.     Chris Fagan MD  PM&R PGY-2    I, Dr. Beck, have seen and personally examined the patient. I have reviewed the above resident's note and agree with content.  Total time: >70 minutes  Time with patient: 30 minutes    MAU Beck MD  ---    Post Admission Physician Evaluation:    I, Dr. Beck, have compared Mr. Churchill' condition on admission to acute  rehabilitation to that outlined in the preadmission screen. History and physical exam performed by me.  No significant differences are identified and the patient remains appropriate for an inpatient rehabilitation facility level of care to manage medical issues and address functional impairments due to severe weakness, lower extremities worse than upper extremities and right worse than left.    Comorbid medical conditions being managed: as noted above.    Prior functional level: was homebound prior to recent admission and used 4WW. Was Mod I with transfers.    Present function: now able to perform bed mobility with supervision. Needs A to don shoes and right AFO. Ambulates about 45 feet with WW and CGA. Needs cues for safety. Has PAEZ. Has impaired activity tolerance. Needs cues for safety with ADLs. Requires max A with lower body dressing.     Anticipated rehabilitation course: anticipate pt will make gains to achieve a level of Mod I for basic transfers and gait. Will need CGA for stairs. Mod I with basic ADLs.plan to discharge with his wife assisting patient as needed. Plan to discharger locally to accessible home with ongoing therapies.    Will benefit from intensive rehabilitation includin minutes each of PT and OT  Rehabilitation nursing  Close management by physiatry    Prognosis: fair    Estimated length of stay: 6-8 days.

## 2018-12-14 NOTE — PROGRESS NOTES
Rehab Admissions:  Met w/ patient this morning to discuss [facility] to discuss setup and structure of program and intensive PT/OT needs. ELOS is 6-8 days. Provided brochure and answered pt/family's questions.    Thank you for the referral, we will continue to follow this patient for post acute placement.     Determination of admission is based upon the patient's need for an intensive, interdisciplinary approach to rehabilitation, their ability to progress, their ability to tolerate intensive therapies, their need for daily physician supervision, their need for twenty four hour nursing assistance, and their ability and willingness to participate in such a program.    Chelsie Drew CM  Rehab Liaison/  [facility]  12/14/2018    12:02 PM

## 2018-12-14 NOTE — PROGRESS NOTES
BRIEF SOCIAL WORK NOTE:    SIMON confirmed with Chelsie in Admissions at [facility] that Pt is still okayed to discharge to [facility] today. No changes to discharge plan. Pt will discharge to:    [facility]  [address] [place] [id number]  Nursing Station: [phone]    Discharge transportation arranged through [facility] (Ph: [phone]) for W/C ride at 12PM.    See SW note from 12/13/18 at 12:49PM for more information.    RANJITH Grossman, [facility]  Acute Care Unit  - Covering for 6C and 6A (Rooms [id number]) on 12/14/18  Phone: [phone]  Pager: [phone]

## 2018-12-14 NOTE — PHARMACY-MEDICATION REGIMEN REVIEW
Pharmacy Medication Regimen Review  Ry Churchill is a 63 year old male who is currently in the Acute Rehab Unit.    Assessment: All medications have an appropriate indications, durations and no unnecessary use was found.    Plan:   Continue current medications/plan.  Attending provider will be sent this note for review.  If there are any emergent issues noted above, pharmacist will contact provider directly by phone.      Pharmacy will periodically review the resident's medication regimen for any PRN medications not administered in > 72 hours and discontinue them. The pharmacist will discuss gradual dose reductions of psychopharmacologic medications with interdisciplinary team on a regular basis.    Please contact pharmacy if the above does not answer specific medication questions/concerns.  Sherry Ordaz, LianaD, BCPS    Background:  A pharmacist has reviewed all medications and pertinent medical history today.  Medications were reviewed for appropriate use and any irregularities found are listed with recommendations.      Current Facility-Administered Medications:      acetaminophen (TYLENOL) tablet 325-975 mg, 325-975 mg, Oral, Q6H PRN, Chris Fagan MD     bisacodyl (DULCOLAX) Suppository 10 mg, 10 mg, Rectal, Daily PRN, Chris Fagan MD     brimonidine (ALPHAGAN) 0.2 % ophthalmic solution 1 drop, 1 drop, Both Eyes, BID, Chris Fagan MD     dorzolamide-timolol (COSOPT) ophthalmic solution 1 drop, 1 drop, Both Eyes, BID, Chris Fagan MD     [START ON 12/15/2018] hydrochlorothiazide (HYDRODIURIL) tablet 25 mg, 25 mg, Oral, Daily, Chris Fagan MD     latanoprost (XALATAN) 0.005 % ophthalmic solution 1 drop, 1 drop, Both Eyes, At Bedtime, Chris Fagan MD     [START ON 12/15/2018] levothyroxine (SYNTHROID/LEVOTHROID) tablet 125 mcg, 125 mcg, Oral, QAM AC, Chris Fagan MD     [START ON 12/15/2018] lisinopril (PRINIVIL/ZESTRIL) tablet 40 mg, 40 mg, Oral, Daily, Chris Fagan  MD     melatonin tablet 1 mg, 1 mg, Oral, At Bedtime PRN, Chris Fagan MD     polyethylene glycol (MIRALAX/GLYCOLAX) Packet 17 g, 17 g, Oral, Daily PRN, Chris Fagan MD     senna-docusate (SENOKOT-S/PERICOLACE) 8.6-50 MG per tablet 1 tablet, 1 tablet, Oral, BID PRN, Chris Fagan MD  No current outpatient prescriptions on file.   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 PMH: HTN, hypothyoridism

## 2018-12-14 NOTE — PLAN OF CARE
Status: Pt on 6a for increasing BLE weakness.   VS: Stable on RA  Neuros: A&Ox4. BUE 5/5, RLE 1/5, LLE 3/5. Absent dorsi/plantar on R. N to R foot. Moderate dorsi/plantar on L.   GI: Reg diet, tolerating well.   : Voids spontaneously without difficulty.   IV: PIV SL.  Activity: Up w/ 2, GB, Walker. Follow with wheelchair in halls. Patient practicing exercises from PT in room independently. Very motivated.  Pain: Denies.  Skin: R ankle incision CDI with liquid bandage. R foot monisha.  Social: Family present part of evening, very supportive.   Plan of Care: Discharge to [facility] tomorrow at noon, [facility] transport arranged--see SW note. Continue to monitor and follow POC.

## 2018-12-15 NOTE — PROGRESS NOTES
FOCUS/GOAL  Bowel management, Bladder management, Mobility, Skin integrity, Cognition/Memory/Judgment/Problem solving and Safety management    ASSESSMENT, INTERVENTIONS AND CONTINUING PLAN FOR GOAL:  Pt has a significant Lumbar stenosis admitted at [facility]U intensive rehab due to lower extremity weakness R >L alert and oriented x4 , used call light appropriately to let staff knows that he needs help with transfer , CGA needed with walker , Continent of urine in the toilet low PVR he will use urinal at night with urinal at bedside , Pt had constipation while in the hospital but it was resolve , he had a Bm in the hospital this AM before coming to ARU , staff to continue monitoring bowel status to prevent constipation and also continue to monitor bladder status until the parameters for discontinuing bladder scan is met , denies pain , he has some tingling on bottom of rt and left foot , with difficulty with gripping , decreased sensation on the on the RLE , with incision on the rt LE from sural nerve biopsy , with glue and LEO , noted to have some swelling and redness above the rt ankle incision , slightly  warm to touch denies pain when touched will leave a note for PMR to look at it ,pt also has some discoloration noted on his skin on both knees and penis , pt said it is due to deficiency in skin pigmentation     set up for hs grooming cares , dry areas on both feet , feet washed with soap and water . Lotion applied . PCDS applied at hs bed alarm at  to alert staff of independent transfer , will continue to maintain skin integrity . Monitor bowel and bladder status , assist pt with safe transfer and ambulation in room and assist to met his rehab goals .

## 2018-12-15 NOTE — PLAN OF CARE
PT eval completed.  Anticipate 10 days to meet goals as outlined in POC.  Eager to return to home.  Indicates that he does not wear AFO in the house, but eval of transfer skills demonstrate that leg is weak enough to benefit from AFO at all times, strength in ant tib is 0/5.

## 2018-12-15 NOTE — PLAN OF CARE
Pt is alert and oriented. Transfers CGA with walker. Denies shortness of breath/chest pain. Continent of bowel/bladder. LBM12/14. BP elevated prior to morning BP medications, 146/104. After medications came down to 133/72. Denies pain. Continue POC.

## 2018-12-15 NOTE — PROVIDER NOTIFICATION
"   18 2100   Living Environment   Lives With spouse   Name(s) of Who Lives With Patient (Nadia)   Current Living Arrangements home/apartment/condo   Quality of Family Relationships involved;supportive   Assessment of Family/Social Support   Marital Status    Who is your support system? (wife, daughters)   Discharge Needs Assessment   Equipment Currently Used at Home dressing device;grab bar, tub/shower;lift device;raised toilet;shower chair;walker, rolling;other (see comments)  (borrows wheelchair for longer distances (appointments))     Social Work: Initial Assessment with Discharge Plan    Patient Name: Ry Crespo \"Deo\" Zhao  : 1955  Age: 63 year old  MRN: [id number]  Completed assessment with: patient  Admitted to ARU: 18    Presenting Information   Date of SW assessment: 18  Health Care Directive: wife has copy (not scanned into Epic yet)  Primary Health Care Agent: wife would be surrogate decision-maker if necessary and HCD is not present  Secondary Health Care Agent: n/a  Living Situation: lives with his wife in house with steps inside home with splint entry (reside in [place], [place])  Previous Functional Status: needed progressively more assistance over the past 12-18 months; wife managed IADL's and did driving  DME available: see above  Patient and family understanding of hospitalization: progressive weakness in my legs and arms  Cultural/Language/Spiritual Considerations: [place]  has visited/brought food when living at home    Physical Health  Reason for admission: neuromuscular disorder (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    Financial/Employment/Education  Employment Status: retired; prior   Income Source: Social Security  Education: [employer]  Financial Concerns: none  Insurance: Medicare/[employer] Federal    Discharge Plan   Patient and family discharge goal: may [place] 2 daughters who live here  Provided education on discharge plan: home care/therapy and outpatient therapy are likely recommendations  Patient agreeable to discharge plan: to be determined  Provided education and attained signature for Medicare IM and IRF Patient Rights and Privacy Information provided to patient : yes  Provided patient with [place] [facility] Resources: n/a  Barriers to discharge: none identified at this time    Discharge Recommendations   Disposition: return home or move into rental home/apartment that's accessible  Transportation Needs: to be determined  Name of Transportation Company and Phone: n/a    Additional comments   D:  See H&P for full medical background.  I:  Met with patient to complete assessment and social work consult.  A:  Patient is doing okay.  Supportive family who is very involved.    P:  SW will follow and assist as needed.    Please invite to Care Conference:  Nadia (wife) [phone]      NATASHA Steele   Phone: [phone]  Pager: [phone]

## 2018-12-15 NOTE — PLAN OF CARE
FOCUS/GOAL  Medical management    ASSESSMENT, INTERVENTIONS AND CONTINUING PLAN FOR GOAL:  Pt. Slept well overnight. Alert and oriented X4. Denied any pain or SOB this shift. Used call light appropriately. Continues to have some swelling and redness to surgical incision on the right outer ankle.  Continent of bladder using urinal. Bed alarm on for safety.

## 2018-12-15 NOTE — PROGRESS NOTES
OT: eval complete, and treatment initiated, pt is CGA with FWW for functional transfers but fatigues after approx 12ft or so, can do to bathroom and back. Pt to complete functional mobility with foot orthotic, Shower planned for tomorrow (recommend pt have w/c to shower and FWW to transfer to tub bench).

## 2018-12-15 NOTE — PROGRESS NOTES
12/15/18 0900   Quick Adds   Type of Visit Initial Occupational Therapy Evaluation   Living Environment   Lives With spouse   Living Arrangements house  (Split level)   Home Accessibility stairs to enter home;stairs within home   Living Environment Comment The patient lives in [place], [place] with wife in a split level home. One small step to enter house. 7 steps to main floor, only needs to go to main floor (pt shower is small standing shower without enough room for a bench or stool, pt also has a whirlpool tub.   Self-Care   Usual Activity Tolerance moderate   Current Activity Tolerance moderate   Equipment Currently Used at Home dressing device;grab bar, tub/shower;lift device;raised toilet;shower chair;walker, rolling;other (see comments)   Activity/Exercise/Self-Care Comment OT: pt primarily remains in house, pt total tolerance has decreased over last few years   Functional Level   Ambulation 1-->assistive equipment   Transferring 1-->assistive equipment   Toileting 1-->assistive equipment   Bathing 0-->independent   Dressing 3-->assistive equipment and person   Eating 0-->independent   Communication 0-->understands/communicates without difficulty   Swallowing 0-->swallows foods/liquids without difficulty   Cognition 0 - no cognition issues reported   Fall history within last six months yes   Number of times patient has fallen within last six months 5   Which of the above functional risks had a recent onset or change? fall history   Prior Functional Level Comment Modified indepdent except did need wife to help with donning socks/shoes ; Mobility: Used a 4WW for short distances. Needed a wheelchair (borrowed) for longer distances such as going to appointments but does not own one. 3 falls outside of home, 1 Fall going down stairs, and 1 Fall missing toiletin when sitting       Present no   Language English   General Information   Onset of Illness/Injury or Date of Surgery - Date 12/06/18    Referring Physician Chris Fagan MD   Patient/Family Goals Statement OT: to be stronger on me legs   Additional Occupational Profile Info/Pertinent History of Current Problem An EMG identified a R radiculopathy and MRI revealed L1-2 stenosis and he actually underwent a decompression of L1-2 and R-sided L5-S1 foraminal procedure as well. However, his weakness continued to progress. He continued to be seen by neurology in 11/2018 without clear etiology found and during an appointment on 12/6/2018 with Dr. Thapa he had progressed so much that he was admitted directly for further work-up.   Precautions/Limitations spinal precautions   General Observations OT: pt is pleasant, but perseverates on independence in standing and transfering (saftey awareness is questionable)   Cognitive Status Examination   Orientation orientation to person, place and time   Level of Consciousness alert   Follows Commands (Cognition) WNL;WFL   Memory intact   Attention No deficits were identified   Organization/Problem Solving No deficits were identified   Cognitive Comment OT: pt has questionable saftey awareness with not wearing brace at home and having history of falls   Visual Perception   Visual Perception No deficits were identified;Wears glasses   Sensory Examination   Sensory Comments OT: pt sensory deficit is in LLE foot only with tingling   Pain Assessment   Patient Currently in Pain No   Integumentary/Edema   Integumentary/Edema no deficits were identifed   Range of Motion (ROM)   ROM Quick Adds No deficits were identified   Strength   Manual Muscle Testing Quick Adds No deficits were identified   Strength Comments OT: Gross motor is 4/5 in BUE, weakness primarily is LE   Coordination   Upper Extremity Coordination No deficits were identified   ARC Assessment Only   Acute Rehab FIM See FIM scores for Mobility/ADL Assessment   Instrumental Activities of Daily Living (IADL)   Previous Responsibilities meal  prep;housekeeping;medication management;finances   Activities of Daily Living Analysis   Impairments Contributing to Impaired Activities of Daily Living balance impaired;coordination impaired;post surgical precautions;ROM decreased;sensation decreased;sensory feedback impaired;strength decreased   General Therapy Interventions   Planned Therapy Interventions ADL retraining;IADL retraining;balance training;cognition;orthotic fitting/training;strengthening;transfer training;home program guidelines;progressive activity/exercise   Clinical Impression   Criteria for Skilled Therapeutic Interventions Met yes, treatment indicated   OT Diagnosis Decreased independence with ADLs   Influenced by the following impairments Decreased strength in BLE & BLE and spinal precuations   Assessment of Occupational Performance 5 or more Performance Deficits   Identified Performance Deficits OT: saftey with functional transfers, dressing, functional mobility, and higher level IADLs   Clinical Decision Making (Complexity) High complexity   Therapy Frequency daily   Predicted Duration of Therapy Intervention (days/wks) 7-10 days   Anticipated Discharge Disposition Home with Assist;Home with Outpatient Therapy   Risks and Benefits of Treatment have been explained. Yes   Patient, Family & other staff in agreement with plan of care Yes   Clinical Impression Comments OT: pt has high level of independence required as wife is unable to provide physical support, but spouse don/doff orthotic as needed. Anticipate with pt current level of function, with trainning for saftey, anticipate if pt demonstrates saftey pt will discharge in 7-10 days due to home set-up with OP therapy to follow up community mobility   Total Evaluation Time   Total Evaluation Time (Minutes) 60  (60 (45 Eval; 15 ADL))

## 2018-12-16 NOTE — PROGRESS NOTES
"PM&R PROGRESS NOTE     Patient seen and examined today.  Coordinated with team.      HPI/ACTIVE ISSUES   Ry Churchill is a 63 year old male, admitted to [facility] ARU on 12/14/2018, well known to me from consult. He was admited here to the ARU with quadriparesis of unknown etiology. He continues to undergo work up, some results are pending.   Differential diagnosis is CIDP vs Motor neuron disease and ALS      Main issues today are as follows   HTN; on lisinopril, blood pressures reviewed and under control   Hypothyroidism; stable and on levothyroxine     Functionally, Ry Churchill underwent evaluation by the therapies yesterdays. We anticipate 10 days of rehab stay. Limited by weakness and fatigue.     Addendum:   Patient seen with nursing regarding the biopsy site, the borders of the incision appear erythematous. He has swelling of the foot. Had expressed more pain than yesterday   Suspect cellulitis, will start Augmentin for 7 days.   Reviewed allergies.     REVIEW OF THE SYSTEMS   Total of ten systems reviewed, pertinent positives and negatives as follows  Denies chest pain fever chills rigors  Denies any shortness of breath   Denies any nausea or vomiting   Appetite good  Denies any lightheadedness or dizziness   On regular diet with thin  Denies any sob or cough   Denies any new rashes   Voiding without any problems, no incontinence   Slept well last night   Remainder of the review of the systems was negative      Physical exam    Vital signs:  Temp: 96  F (35.6  C) Temp src: Oral BP: 139/80 Pulse: 74   Resp: 16 SpO2: 94 % O2 Device: None (Room air)   Height: 177.8 cm (5' 10\") Weight: 141.2 kg (311 lb 4.8 oz)  Estimated body mass index is 44.67 kg/m  as calculated from the following:    Height as of this encounter: 1.778 m (5' 10\").    Weight as of this encounter: 141.2 kg (311 lb 4.8 oz).  HEENT NC AT PRTL EOM good   Neck supple  Heart S1S2  Lungs CTA  Abdomen  benign BS positive NT NR   LE no " edema          Right leg in AFO       REVIEWED THE MEDICATIONS BELOW   Current Facility-Administered Medications   Medication     acetaminophen (TYLENOL) tablet 325-975 mg     bisacodyl (DULCOLAX) Suppository 10 mg     brimonidine (ALPHAGAN) 0.2 % ophthalmic solution 1 drop     dorzolamide-timolol (COSOPT) ophthalmic solution 1 drop     hydrochlorothiazide (HYDRODIURIL) tablet 25 mg     latanoprost (XALATAN) 0.005 % ophthalmic solution 1 drop     levothyroxine (SYNTHROID/LEVOTHROID) tablet 125 mcg     lisinopril (PRINIVIL/ZESTRIL) tablet 40 mg     melatonin tablet 1 mg     polyethylene glycol (MIRALAX/GLYCOLAX) Packet 17 g     senna-docusate (SENOKOT-S/PERICOLACE) 8.6-50 MG per tablet 1 tablet       No results found for this or any previous visit (from the past 24 hour(s)).

## 2018-12-16 NOTE — PROGRESS NOTES
FOCUS/GOAL  Bowel management, Bladder management, Mobility, Skin integrity, Cognition/Memory/Judgment/Problem solving and Safety management    ASSESSMENT, INTERVENTIONS AND CONTINUING PLAN FOR GOAL:  Alert and oriented pleasant and cooperative with cares , used callight appropriately and is able to verbalized his concerns well , CGA using walker and he  Wears an AFO on the lrtleg when he is ambulating , continent of bladder in the toilet , assisof 1tpericare and clothing management  , he used the urinal at hs with set up denies pain .  With redness above the surgical incision from sural biopsy on the rt ankle which is red and warm to touch warmer this evening as compared from last evening  , and also noted 2 small intact blisters above the red areas on the rt ankle denies pain when touch , 2 blisters were covered with Mipelex dressing,  Wound are was done on the rt ankle incision site, will communicate on the report for the PMR to look at it tomorrow .  Pt has no bm today , had a bm yesterday in the hospital  After several days of not having a BM . Encouraged to increase his fluid intake , stool softeners offered at hs pt declined , he said he might have a BM in AM after he drunk warm coffee will continue to monitor bowel status and continue to monitor the area on the rt ankle which is the biopsy site for infection , continue to assist with safe transfer and ambulation in room .

## 2018-12-16 NOTE — PROGRESS NOTES
12/15/18 1251   Quick Adds   Type of Visit Initial PT Evaluation   Living Environment   Lives With spouse   Living Arrangements house   Home Accessibility not wheelchair accessible;stairs to enter home;stairs within home   Living Environment Comment 7/7 split, has been needing assist w/ stairs   Self-Care   Usual Activity Tolerance fair   Current Activity Tolerance fair   Equipment Currently Used at Home walker, rolling   Activity/Exercise/Self-Care Comment Has been gradually declining, primarily homebound, does not wear AFO in the house   Functional Level Prior   Ambulation 1-->assistive equipment   Transferring 1-->assistive equipment   Toileting 1-->assistive equipment   Bathing 0-->independent   Communication 0-->understands/communicates without difficulty   Swallowing 0-->swallows foods/liquids without difficulty   Cognition 0 - no cognition issues reported   Fall history within last six months yes   Number of times patient has fallen within last six months 5   Which of the above functional risks had a recent onset or change? ambulation;transferring;toileting   General Information   Onset of Illness/Injury or Date of Surgery - Date 12/06/18   Referring Physician Ebony Lewis   Patient/Family Goals Statement return to home w/ assist from family   Pertinent History of Current Problem (include personal factors and/or comorbidities that impact the POC) (R) LE weakness treated w/ laminectomy but w/o change; weakness progressed and now considering dx of ALS   Precautions/Limitations fall precautions   Heart Disease Risk Factors Overweight;Lack of physical activity   Cognitive Status Examination   Orientation orientation to person, place and time   Level of Consciousness alert   Follows Commands and Answers Questions 100% of the time;able to follow single-step instructions   Personal Safety and Judgment intact;impulsive   Pain Assessment   Patient Currently in Pain No   Integumentary/Edema    Integumentary/Edema Comments +2 LE's   Posture    Posture Forward head position   Posture Comments flexed standing   Range of Motion (ROM)   ROM Quick Adds No deficits were identified   MMT: Hip, Rehab Eval   Hip Flexion - Left Side (4/5) good, left   Hip Extension - Left Side (4/5) good, left   Hip ABduction - Left Side (4/5) good, left   Hip Flexion - Right Side (2-/5) poor minus, right   Hip Extension - Right Side (2-/5) poor minus, right   Hip ABduction - Right Side (2-/5) poor minus, right   MMT: Knee, Rehab Eval   Knee Flexion - Left Side (4/5) good, left   Knee Extension - Left Side (4/5) good, left   Knee Flexion - Right Side (1+/5) trace plus, right   Knee Extension - Right Side (2+/5) poor plus, right   MMT: Ankle, Rehab Eval   Ankle Dorsiflexion - Left Side (4/5) good, left   Ankle Plantarflexion - Left Side (4/5) good, left   Ankle Dorsiflexion - Right Side (1+/5) trace plus, left   Ankle Plantarflexion - Right Side (1+/5) trace plus, left   ARC Assessment Only   Acute Rehab FIM See FIM scores for Mobility/ADL Assessment   Balance   Balance other (describe)   Sitting Balance: Static (able to sit unsupported w/o UE assist)   Sitting Balance: Dynamic (able to dissociate trunk and pelvis)   Sit-to-Stand Balance (heavily reliant on UE's, unable to stand w/o UE support)   Standing Balance: Static (needs UE support, does not put wt on (R) LE)   Standing Balance: Dynamic (Unable to dissociate trunk/pelvis in standing)   Systems Impairment Contributing to Balance Disturbance somatosensory;neuromuscular;musculoskeletal   Identified Impairments Contributing to Balance Disturbance decreased strength   Sensory Examination   Sensory Perception Comments Intact vibration, monofilament, great toe (B)   Coordination   Coordination no deficits were identified   Muscle Tone   Muscle Tone no deficits were identified   Modality Interventions   Planned Modality Interventions Microcurrent Electrical Stimulation   Planned  Modality Interventions Comments may benefit from FES for LE strengthening but careful consideration for fatigue   General Therapy Interventions   Planned Therapy Interventions balance training;gait training;groups;neuromuscular re-education;strengthening;transfer training;home program guidelines;progressive activity/exercise   Clinical Impression   Criteria for Skilled Therapeutic Intervention yes, treatment indicated   PT Diagnosis Movement d/o force production deficit   Influenced by the following impairments (R) LE strength   Functional limitations due to impairments assist w/ transfers, amb, bed mobililty, stairs   Clinical Presentation Unstable/Unpredictable   Clinical Presentation Rationale >4 personal factors, unknown dx   Clinical Decision Making (Complexity) High complexity   Therapy Frequency` 2 times/day   Predicted Duration of Therapy Intervention (days/wks) 10 days   Anticipated Equipment Needs at Discharge (has rollator; drive rollator-transport chair on order)   Anticipated Discharge Disposition Home with Assist;Home with Home Therapy;Home with Outpatient Therapy   Risk & Benefits of therapy have been explained Yes   Patient, Family & other staff in agreement with plan of care Yes   Clinical Impression Comments 62 y/o male w/ pending dx for LE weakness, consideration of ALS; already compensating well although does not wear AFO in the house which increases his risk of falls due to the extent of the weakness in that leg.  Consider FES strengthening, otherwise anticipate compensatory interventions w/ HEP.   Total Evaluation Time   Total Evaluation Time (Minutes) 30

## 2018-12-16 NOTE — PLAN OF CARE
FOCUS/GOAL  Bowel management, Bladder management and Mobility    ASSESSMENT, INTERVENTIONS AND CONTINUING PLAN FOR GOAL:  Pt is alert and oriented. Transfers with assist of 1 and walker. Wears right AFO for walking/transfers. Denies shortness of breath/chest pain. Continent of bowel/bladder. Pt states he feels like he needs to have a bowel movement but feeling slightly constipated. Offered PRN senna or miralax this AM but pt declined stating he would like to take this evening. Blisters to right ankle above biopsy site. Dressing changed. primapore dressing also applied to biopsy site this AM. Scant amount of bleeding noted at bottom part of incision, appeared to be rubbing on sock or shoe. Incision is still intact no open areas seen. Denies pain. Continue POC.     1200: MD looked at biopsy site incision and ordered antibiotic for infection prophylaxis.

## 2018-12-16 NOTE — PLAN OF CARE
"Discharge Planner PT   Patient plan for discharge: home, split level, 7 to enter, wife present  Current status: amb w/ assist, ww; 4\" stairs step to pattern  Barriers to return to prior living situation: (R) LE weakness  Recommendations for discharge: compensatory strategies for mobility; HEP for (R) LE strengthening and CV  Rationale for recommendations: patient presentation       Entered by: Norma White 12/16/2018 12:50 PM      "

## 2018-12-16 NOTE — PLAN OF CARE
FOCUS/GOAL  Medical management    ASSESSMENT, INTERVENTIONS AND CONTINUING PLAN FOR GOAL:    Pt is alert and oriented. Slept well overnight. Transfers CGA with walker. Denies shortness of breath/chest pain. Continent of bladder overnight. LBM12/14. Bed alarm on for safety.

## 2018-12-16 NOTE — PROGRESS NOTES
"PM&R PROGRESS NOTE     Patient seen and examined today.  Coordinated with team.      HPI/ACTIVE ISSUES   Ry Churchill is a 63 year old male, admitted to [facility] ARU on 12/14/2018, well known to me from consult. He was admited here to the ARU yesterday.     Ry Churchill is a 63 year old male who presents with quadriparesis of unknown etiology. He continues to undergo work up, some results are pending.   He has PMH of lumbar stenosis.   Differential diagnosis is CIDP vs Motor neuron disease and ALS  His workup thus far has included EMG which showed wide spread denervation, but no clear evidence of inflammatory neuropathy.      Main issues today are as follows     Functionally, Ry Churchill was p,admitted yesterday and will undergo evaluation by the therapies     REVIEW OF THE SYSTEMS   Total of ten systems reviewed, pertinent positives and negatives as follows  Denies chest pain fever chills rigors  Denies any shortness of breath   Denies any nausea or vomiting   Appetite good  Denies any lightheadedness or dizziness   On regular diet with thins/nectar thickened liquids   Denies any pain    Denies any sob or cough   Denies any new rashes   Mood euphoric   LBM yesterday=ay   Voiding without any problems, no incontinence   Slept well last night   Remainder of the review of the systems was negative      Physical exam    Vital signs:  Temp: 97.6  F (36.4  C) Temp src: Oral BP: 122/63 Pulse: 67 Heart Rate: 59 Resp: 16 SpO2: 96 % O2 Device: None (Room air)   Height: 177.8 cm (5' 10\") Weight: 141.2 kg (311 lb 4.8 oz)  Estimated body mass index is 44.67 kg/m  as calculated from the following:    Height as of this encounter: 1.778 m (5' 10\").    Weight as of this encounter: 141.2 kg (311 lb 4.8 oz).  HEENT NC AT PRTL EOM good   Neck supple  Heart S1S2  Lungs CTA  Abdomen  benign BS positive NT NR   LE no edema        BLE weaknesss     REVIEWED THE MEDICATIONS BELOW   Current Facility-Administered " Medications   Medication     acetaminophen (TYLENOL) tablet 325-975 mg     bisacodyl (DULCOLAX) Suppository 10 mg     brimonidine (ALPHAGAN) 0.2 % ophthalmic solution 1 drop     dorzolamide-timolol (COSOPT) ophthalmic solution 1 drop     hydrochlorothiazide (HYDRODIURIL) tablet 25 mg     latanoprost (XALATAN) 0.005 % ophthalmic solution 1 drop     levothyroxine (SYNTHROID/LEVOTHROID) tablet 125 mcg     lisinopril (PRINIVIL/ZESTRIL) tablet 40 mg     melatonin tablet 1 mg     polyethylene glycol (MIRALAX/GLYCOLAX) Packet 17 g     senna-docusate (SENOKOT-S/PERICOLACE) 8.6-50 MG per tablet 1 tablet       No results found for this or any previous visit (from the past 24 hour(s)).

## 2018-12-17 NOTE — PLAN OF CARE
FOCUS/GOAL  Bowel management, Bladder management and Wound care management    ASSESSMENT, INTERVENTIONS AND CONTINUING PLAN FOR GOAL:  AOX4, uses call light appropriately and able to make needs known. CGA with walker for transfers. Continent of B&B. Dressing on right ankle incision changed, no drainage, no sign of infection. Denies pain or SOB, pleasant and cooperative. Continue to monitor as POC.

## 2018-12-17 NOTE — PLAN OF CARE
FOCUS/GOAL  Medical management    ASSESSMENT, INTERVENTIONS AND CONTINUING PLAN FOR GOAL:    Pt is alert and oriented X4. Slept well overnight. No pain or SOB. Transfers CGA with walker.  Continent of bladder overnight. LBM12/14. Pt has redness on the rt upper ankle area above the incision and was started on po Augmentin today 12/16, slightly warm to touch and less red as compared from last night. Bed alarm on for safety.

## 2018-12-17 NOTE — PLAN OF CARE
Skilled set up on :  Pt dependent for proper placement of electrodes on R quadriceps, hamstrings, anterior tib, and gastroc complex for optimum muscle contraction, safe positioning on w/c within frame and cushion for prevention of skin breakdown, feet secured to foot pedals, w/c secured to  w/ Q-straints.  Passive motion assessed to ensure proper positioning.  Determined appropriate FES parameters for each muscle group based off of strong tetanic response of muscle test.     Pt performed 15 minutes of active FES ergometry with min = max stimulation applied to above muscles at 35 rpm with 0.5 nm resistance.  This PT adjusted e-stim and cycling parameters in real-time to ensure palpable muscle contractions throughout session.  Please see [email or web address] for further details on patient's stimulation parameters and ergometry outcomes.      Patient ID: [id number] PIN: [id number].

## 2018-12-17 NOTE — PROGRESS NOTES
"[facility]   Acute Rehabilitation Unit  Daily progress note      INTERVAL HISTORY  Ry Churchill was seen and examined at bedside. NAOE. Reports doing well. Feels like he is progressing in therapies. Minimal pain to the biopsy site. Reports he had a BM last night.     MEDICATIONS    Scheduled Medications:      amoxicillin-clavulanate  1 tablet Oral Q8H MARISA     brimonidine  1 drop Both Eyes BID     dorzolamide-timolol  1 drop Both Eyes BID     hydrochlorothiazide  25 mg Oral Daily     latanoprost  1 drop Both Eyes At Bedtime     levothyroxine  125 mcg Oral QAM AC     lisinopril  40 mg Oral Daily        PRN Medications:    acetaminophen, bisacodyl, melatonin, polyethylene glycol, senna-docusate     OBJECTIVE    /67 (BP Location: Left arm)   Pulse 77   Temp 98.9  F (37.2  C) (Oral)   Resp 18   Ht 1.778 m (5' 10\")   Wt 141.2 kg (311 lb 4.8 oz)   SpO2 95%   BMI 44.67 kg/m      PHYSICAL EXAM:  Gen: Alert. NAD. Lying in bed.  Respiratory: No increased WOB on RA.   Extremities: No significant edema  Neurological: Awake, alert. Converses appropriately. Seen walking halls with walker, wears R AFO and drags foot somewhat.  Skin: Sural nerve biopsy site with some surrounding erythema as below. Minimal surrounding tenderness. No drainage.       New labs/imaging:  CBC w/ diff, ESR, and CRP unremarkable.        ASSESSMENT/PLAN    Mr Churchill is a 64 y/o man with who has had progressive lower extremity weakness (R>L) and decreased  strength x ~15 months with broad neurological work-up and unclear cause currently (possibly ALS vs CIDP vs MMN). Admitted to ARU 12/14/2018).      Functional Deficits: RLE>LLE weakness. Decreased  strength bilaterally (R>L).      Rehab admission code: 03.8 neuromuscular disorder        PT, OT 90 minutes of each on a daily basis, in addition to rehab nursing and close management of physiatrist.      --Impairment of ADL's: OT for 90 " minutes daily to work on ADL re-training such as grooming, self-cares, and bathing  -- Impairment of Mobility: PT for 90 minutes daily to work on neuromuscular re-education focusing on strength, balance, coordination, and endurance.   -- Rehab RN for med administration, bowel regimen, glucose monitoring, and wound care  -- Adjustment to disability: Health psychology consult. Discussed with pt. Very anxious about possible diagnoses and prognosis.      Medical Management:     #Progressive Weakness (RLE>LLE), decreased  strength: Has had broad work-up, including initial lumbar surgery without improvement, EMG, and LP with no clear cause (possibly ALS vs CIDP vs MMN). Received IVIG (12/6 - 12/10) with possibly some improvement in  strength  -- Sural nerve biopsy, 12/11. Results pending  -- Multiple antibody panels pending  -- Follow-up with neuromuscular clinic after rehab     #? Cellulitis surrounding sural nerve biopsy site. Labs (CBC w/ diff, ESR, CRP) 12/17 reassuring.   -- Continue Augmentin x 7 days (started 12/16)  -- Monitor    #HTN:  -- Continue PTA Lisinopril, hydrochlorothiazide     #Hypothyroidism:  -- Continue PTA Synthroid     #Glaucoma:  -- Continue PTA eyedrops         Diet: Regular  Bladder: PVRs <100 mL  Bowel: PRN medications available  DVT ppx: SCDs.   Code Status: Full, discussed on admission  Dispo: ELOS 7-10 days. Plan to discharge home with support of wife. 7 steps to main floor may be a challenge.         Follow up Appointments on Discharge:  Neuromuscular clinic  PCP    Staffed with attending physician, Dr Green, who agrees with plan.     Chris Fagan MD  PM&R PGY-2        I, Samantha Green, saw this patient with my resident Dr. Fagan and agree with his findings and plan of care as documented in his note.     I personally reviewed the chart (vitals signs, medications, and labs).     My key findings and pollard manege ment decisions made by me: increased erythema and slight tenderness  to palpation around the incision. Minimal drainage on the dressing. Was started on Augmentin yesterday which will continue. Labs unremarkable and clinically doing well. Will monitor and consider to contact surgery team if needed. Path report is pending; he has an appointment with Dr. Collins on 12/26.     Functionally, he is making slow progress. LB dressing and osvaldo cares are challenging due to RLE weakness and some sensory loss.       I spent a total of 25 minutes face-to-face and managing the care of Ry Churchill. Over 50% of my time on the unit was spent counseling the patient and coordinating care. See note for details.

## 2018-12-17 NOTE — TELEPHONE ENCOUNTER
FUTURE VISIT INFORMATION      FUTURE VISIT INFORMATION:    Date: 12/26/18    Time: 8.30a    [place] [facility] [place] INFORMATION:    Referring provider:  Dr. Thiago Moon      Referring providers clinic:  [facility] ED    Reason for visit/diagnosis  Peripheral Neuropathy    RECORDS REQUESTED FROM:       Clinic name Comments Records Status Imaging Status   [facility] ED 12/6/18 ED Note Internal [facility] Neurology Dr. Thapa 12/6/18 OV  [facility] 11/21/18 Internal [facility]                               12/17/18: Internal referral from Dr. Moon at [facility] ED. Patient sees Dr. Thapa and had an EMG as well. Records and imaging are available in [facility].

## 2018-12-17 NOTE — PLAN OF CARE
OT: Working on using AE for LB cares. Pt doing well with managing clothes using sock aid and reacher. Pt able to do osvaldo cares without tongs today, need to keep reinforcing this to see how it goes and to determine whether pt needs toilet aide.

## 2018-12-17 NOTE — PROGRESS NOTES
FOCUS/GOAL  Bowel management, Bladder management, Mobility, Skin integrity and Cognition/Memory/Judgment/Problem solving    ASSESSMENT, INTERVENTIONS AND CONTINUING PLAN FOR GOAL:  Using callight for assistance to go to toilet CGA with transfer , continent of urine and had alarge BM in the toilet , assist of one with pericare , staff encouraged pt to try to do it by himself with set up and steadying assist from staff , use AFO on the rt leg when up , pt has redness on the rt upper ankle area above the incision and was started on po Augmentin today , slight ly warm to touch and less red as compared from last night , wound care was done on the rt ankle incision no drainage noted , with dried scant sangenous drainage on the old dressing , new gauze dressing was applied will continue to monitor redness above the rt ankle incision site and the effectiveness of antibiotic therapy , pt denies pain on the site , continue to monitor incision site on the rt ankle from  s/s of infection and monitor bowel status due to hx of constipation .

## 2018-12-18 NOTE — PROGRESS NOTES
FOCUS/GOAL  Bowel management, Bladder management, Mobility, Skin integrity, Cognition/Memory/Judgment/Problem solving and Safety management    ASSESSMENT, INTERVENTIONS AND CONTINUING PLAN FOR GOAL:  Alert and oriented x4 , used call light appropriately for assistance with transfer , continent of bladder and bowel in the toilet ambulated to the toilet with CGA using walker pt is still needing assist with pericare after having a BM , discussed with pt about trying to wipe his rectal area on his next bowel movement with steadying assist and staff handling to him the wipes , he said he will try doing it  With a goal to be independent with that task befor his discharge .  Denies pain on the rt ankle surgical area , redness around the area is getting better , dressing on the surgical incision is CDI currently on po antibiotic for soft tissue infection .Continue to monitor site for s/s of infection , promote a regular bowel movement , continue to asssist with safe transfer and ambulation in room .

## 2018-12-18 NOTE — PLAN OF CARE
Deo is very good about verbalizing his needs. Also able to direct this writer on application of right leg brace. Dressing changed to RT LE, pink at interior portion of incision, the remainder is scabbed. Last BM yesterday, denies need for any pain medication at this time.

## 2018-12-18 NOTE — PLAN OF CARE
A&O x4. Pt denied pain, nausea, SOB or any other new concerns overnight. Pt has baseline numbness/tingling in all extremities. Pt continent of bowel and bladder. Pt slept majority of shift.  Pt got up in chair with stand/pivot assist x 1 this am and got dressed. Pt said he was not going to wear his brace until he was going to be walking. RN reminded pt brace was also for when he is out of bed but pt declined to wear. Call light within reach and pt able to make needs known.

## 2018-12-18 NOTE — PLAN OF CARE
PT: pt working well with PT. Pt needing CGA to min A for balance and stability for sit to stand and amb up to 50'x 4 during PT session. Pt demo sit to stand progressing to equal WB.

## 2018-12-18 NOTE — PROGRESS NOTES
"[facility]   Acute Rehabilitation Unit  Daily progress note      INTERVAL HISTORY  NAOE. Pt is noted to have somewhat elevated BPs in the AM but then normalize during the day. Discussed trying to split up antihypertensives, pt amenable to this plan. Otherwise reports that he is doing well. Working on osvaldo-cares with therapy. He has no other concerns. No new pain, numbness, tingling, weakness. Dressing to incision changed, denies any new redness.     MEDICATIONS    Scheduled Medications:      amoxicillin-clavulanate  1 tablet Oral Q8H MARISA     brimonidine  1 drop Both Eyes BID     dorzolamide-timolol  1 drop Both Eyes BID     hydrochlorothiazide  25 mg Oral Daily     latanoprost  1 drop Both Eyes At Bedtime     levothyroxine  125 mcg Oral QAM AC     [START ON 12/19/2018] lisinopril  20 mg Oral BID        PRN Medications:    acetaminophen, bisacodyl, melatonin, polyethylene glycol, senna-docusate     OBJECTIVE    /90 (BP Location: Right arm)   Pulse 71   Temp 97.4  F (36.3  C) (Oral)   Resp 16   Ht 1.778 m (5' 10\")   Wt 141.2 kg (311 lb 4.8 oz)   SpO2 92%   BMI 44.67 kg/m      PHYSICAL EXAM:  Gen: Alert. NAD. Sitting in chair.   Cardiac: RRR  Respiratory: No increased WOB on RA. Lungs CTAB  Extremities: No significant edema  Neurological: Awake, alert. Converses appropriately. Antigravity in all extremities except RLE.   Skin: Sural nerve biopsy site not examined today.     New labs/imaging:  None    ASSESSMENT/PLAN    Mr Churchill is a 64 y/o man with who has had progressive lower extremity weakness (R>L) and decreased  strength x ~15 months with broad neurological work-up and unclear cause currently (possibly ALS vs CIDP vs MMN). Admitted to ARU 12/14/2018).      Functional Deficits: RLE>LLE weakness. Decreased  strength bilaterally (R>L).      Rehab admission code: 03.8 neuromuscular disorder        PT, OT 90 minutes of each on a daily basis, in addition to " rehab nursing and close management of physiatrist.      --Impairment of ADL's: OT for 90 minutes daily to work on ADL re-training such as grooming, self-cares, and bathing  -- Impairment of Mobility: PT for 90 minutes daily to work on neuromuscular re-education focusing on strength, balance, coordination, and endurance.   -- Rehab RN for med administration, bowel regimen, glucose monitoring, and wound care  -- Adjustment to disability: Health psychology consult. Discussed with pt. Very anxious about possible diagnoses and prognosis.      Medical Management:     #Progressive Weakness (RLE>LLE), decreased  strength: Has had broad work-up, including initial lumbar surgery without improvement, EMG, and LP with no clear cause (possibly ALS vs CIDP vs MMN). Received IVIG (12/6 - 12/10) with possibly some improvement in  strength  -- Sural nerve biopsy, 12/11. Results pending  -- Multiple antibody panels pending  -- Follow-up with neuromuscular clinic after rehab, scheduled with Dr. Collins 12/26     #? Cellulitis surrounding sural nerve biopsy site. Labs (CBC w/ diff, ESR, CRP) 12/17 reassuring.   -- Continue Augmentin x 7 days (started 12/16)  -- Monitor    #HTN: Elevated BPs ~150 in the AM but then normalize dyuring the day.   -- Switched PTA Lisinopril 40 mg daily to 20 mg BID  -- Continue PTA hydrochlorothiazide     #Hypothyroidism:  -- Continue PTA Synthroid     #Glaucoma:  -- Continue PTA eyedrops         Diet: Regular  Bladder: PVRs <100 mL  Bowel: PRN medications available  DVT ppx: SCDs.   Code Status: Full, discussed on admission  Dispo: ELOS 7-10 days. Plan to discharge home with support of wife. 7 steps to main floor may be a challenge.         Follow up Appointments on Discharge:  Neuromuscular clinic  PCP    Staffed with attending physician, Dr Green, who agrees with plan.     Chris Fagan MD  PM&R PGY-2      I, Samantha Green, saw this patient with my resident Dr. Fagan and agree with his  findings and plan of care as documented in his note.     I personally reviewed the chart (vitals signs, medications, and labs).     My key findings and pollard manege ment decisions made by me:   Doing well. No fever, chills or pain at the site of biopsy. Reviewed lab results; will complete antibiotic course.     Doing well in therapies. Will discuss with the team and finalize her discharge date. Has appointment with Dr. Collins in clinic on 12/16.     Pt needing CGA to min A for balance and stability for sit to stand and amb up to 50'x 4 during PT session.      I spent a total of 20 minutes face-to-face and managing the care of Ry Churchill. Over 50% of my time on the unit was spent counseling the patient and coordinating care. See note for details.

## 2018-12-19 NOTE — PLAN OF CARE
VS: Afebrile, vss    O2: WNL on room air. Lungs clear.    Output: Voiding via urinal. Staff empties. Continent of bowel and bladder.    Last BM: 12-18-18   Activity: Up with assist of one, tbelt and walker.    Skin: Intact except for dressing over right lower extremity.    Pain: None endorsed.    CMS: Numbness present in right lower extremity with foot drop. Generalized weakness   Dressing: Intact    Diet: Regular   LDA: None    Equipment: Wheelchair, t belt, walker.   Plan: 12-24-18 discharge    Additional Info: Pleasant and cooperative.       1:30pm   Dressing changed to right lower leg. No drainage. Site red.

## 2018-12-19 NOTE — PROGRESS NOTES
"[facility]   Acute Rehabilitation Unit  Daily progress note      INTERVAL HISTORY  He was seen during PT. Doing well. Slept ok last night. No pain or discomfort. Pleased with his progress in therapies.     BP in better range this morning but elevated in the afternoon.     Appreciate Dr. Pierson's note and f/u.     Was able to ambulate from his room to the nursing station today with frequent rest breaks, limited by fatigue and weakness. I with all bed mobility and SBA for sit to stand using FWW.     MEDICATIONS    Scheduled Medications:      amoxicillin-clavulanate  1 tablet Oral Q8H MARISA     brimonidine  1 drop Both Eyes BID     dorzolamide-timolol  1 drop Both Eyes BID     hydrochlorothiazide  25 mg Oral Daily     latanoprost  1 drop Both Eyes At Bedtime     levothyroxine  125 mcg Oral QAM AC     lisinopril  20 mg Oral BID        PRN Medications:    acetaminophen, bisacodyl, melatonin, polyethylene glycol, senna-docusate     OBJECTIVE    BP (!) 154/91 (BP Location: Right arm)   Pulse 59   Temp 96.1  F (35.6  C) (Oral)   Resp 20   Ht 1.778 m (5' 10\")   Wt 141.2 kg (311 lb 4.8 oz)   SpO2 93%   BMI 44.67 kg/m      PHYSICAL EXAM:  Gen: NAD, pleasant   Respiratory: non-labored in room air  Extremities: No significant edema - dressing intact at R ankle intact  Neurological:   Ambulating with FWW and right PLS AFO in place  Shortened step length on the left side, difficulty with clearing his right foot due to minimal HF during swing phase      New labs/imaging:  No new today     ASSESSMENT/PLAN    Mr Churchill is a 62 y/o man with who has had progressive lower extremity weakness (R>L) and decreased  strength x ~15 months with broad neurological work-up and unclear cause currently (possibly ALS vs CIDP vs MMN). Admitted to ARU 12/14/2018).      Functional Deficits: RLE>LLE weakness. Decreased  strength bilaterally (R>L).      Rehab admission code: 03.8 neuromuscular " disorder        PT, OT 90 minutes of each on a daily basis, in addition to rehab nursing and close management of physiatrist.      --Impairment of ADL's: OT for 90 minutes daily to work on ADL re-training such as grooming, self-cares, and bathing  -- Impairment of Mobility: PT for 90 minutes daily to work on neuromuscular re-education focusing on strength, balance, coordination, and endurance.   -- Rehab RN for med administration, bowel regimen, glucose monitoring, and wound care  -- Adjustment to disability: Health psychology consult. Discussed with pt. Very anxious about possible diagnoses and prognosis.      Medical Management:     #Progressive Weakness (RLE>LLE), decreased  strength: Has had broad work-up, including initial lumbar surgery without improvement, EMG, and LP with no clear cause (possibly ALS vs CIDP vs MMN). Received IVIG (12/6 - 12/10) with possibly some improvement in  strength  -- Sural nerve biopsy, 12/11. Results pending  -- Multiple antibody panels pending  -- Follow-up with neuromuscular clinic after rehab, scheduled with Dr. Collins 12/26     #? Cellulitis surrounding sural nerve biopsy site. Labs (CBC w/ diff, ESR, CRP) 12/17 reassuring.   -- Continue Augmentin x 7 days (started 12/16)  -- Monitor    #HTN: Elevated BPs ~150 in the AM but then normalize dyuring the day.   -- Switched PTA Lisinopril 40 mg daily to 20 mg BID 12/18  -- Continue PTA hydrochlorothiazide     #Hypothyroidism:  -- Continue PTA Synthroid     #Glaucoma:  -- Continue PTA eyedrops         Diet: Regular  Bladder: PVRs completed and acceptable.   Bowel: PRN medications available  DVT ppx: SCDs.   Code Status: Full, discussed on admission  Dispo: ELOS 7-10 days. Plan to discharge home with support of wife. 7 steps to main floor may be a challenge.         Follow up Appointments on Discharge:  Neuromuscular clinic  PCP    Samantha Green MD  Physical Medicine & Rehabilitation

## 2018-12-19 NOTE — PLAN OF CARE
FOCUS/GOAL  Bowel management, Bladder management, Pain management, Wound care management and Mobility    ASSESSMENT, INTERVENTIONS AND CONTINUING PLAN FOR GOAL:    Patient is A&O, continent of bowel/bladder. No BM this shift. Right ankle biopsy site covered with gauze dressing, no visible drainage. Patient transfers Ax1 with a walker. Stands at bedside to use a urinal with staff assist at [facility]. No c/o pain, SOB or n/v/d. Continue POC.

## 2018-12-19 NOTE — CONSULTS
Health Psychology                  Clinic    Department of Medicine  Nadia Pierson, Ph.D., L.P. [phone]                          Clinics and Surgery Center  [facility] Gerson Lino, Ph.D.,  L.P. [phone]                 3rd Floor  [place] Mail Code 921   Deysi Beaulieu, Ph.D., L.P. [phone]    [address] Franny Byers, Ph.D., L.P. [phone]            [place] [address]          Zak Etienne, Ph.D., A.AFR[place].ACACIA.ACACIA., L.P. [phone]      Latia Brown, Ph.D., L.P. [phone]      Inpatient Health Psychology Consultation*    DOS:  12/19/2018      REFERRAL SOURCE:  Samantha Green MD, [facility], [facility].      REASON FOR REFERRAL:  Ry Churchill is a 63-year-old man currently on the [facility].  Mr. Churchill has experienced progressive weakness over the past 15 months that is now awaiting some results of testing to determine a diagnosis; current differential diagnosis includes ALS versus CIDP versus MMN.  Treating providers in Neurology indicated that family have reported some behavioral and personality changes that they have been concerned about.  This evaluation was requested to assess his emotional status and to make treatment recommendations.      HISTORY OF PRESENT ILLNESS:  When I entered Mr. Churchill' room, he was in bed awake, but in the dark.  He immediately sat up and engaged easily in conversation.  He reported that his mood is even-keeled at baseline and denied any history of significant depression, anxiety or other emotional issues that have affected his functioning negatively.  He reiterated multiple times his awareness that life contains a great deal of uncertainty and told me that he has gone through many difficult issues and has learned to let go.  However, I do note that during the information he provided, particularly when talking about the death of  "his first wife 11 years ago, he became quite choked up and emotional.  He also acknowledges an awareness that he has been carrying some anxiety about the possible different diagnoses that he has been informed could be possible.  He acknowledged, with some reluctance, that he does experience a fair number of thoughts during the day that are of the \"what if\" type; however, he pushed back against my suggestion that this might reflect anxiety.  He made multiple comments throughout this evaluation that if he has a dire illness that will not allow him to get back to a higher level of functioning, that he would \"prefer to go quickly.\"  At the same time, he denies any current cardinal symptoms of depression, denying hopelessness and suicidal ideation.  He is very motivated within his rehabilitation therapies, but acknowledges some anxiety about requests that he receives from Occupational Therapy, telling me that he would prefer to just focus on basic strengthening as he feels quite deconditioned and feels uncertain about engaging in some of the tasks they want to work on because they feel unsafe. He was pleased to report his perception that his  strength increased following a 5-day IVIG treatment.     Mr. Churchill was very engaged in this conversation, asking me if I was interested in seeing some of his pictures when they were related to topics that we had discussed.      SOCIAL HISTORY:  Mr. Churchill grew up in [place], [place] with 1 older brother and 4 sisters.  He attended [employer] until the high school was closed following his sophomore year and completed high school at the public school; he notes that this was a difficult transition and he found it hard to integrate socially.  Mr. Churchill and his first wife were  for over 25 years until her death from ovarian cancer 12 years ago.  Mr. Churchill and his current wife, Teri, have been  for 11 years.  They continue to travel a great deal, " although Mr. Churchill is now making the decision that he will not go on their annual trip to [place] that is already booked for February because it would be too difficult physically to do the traveling.  Mr. Churchill has 2 daughters from his first marriage who both live in the [facility] area.  He is completely detached from the [employer], and attends the [employer] with his wife, although he told me that he has a strong interest in [employer]ist philosophy.  Mr. Churchill lived in a small town in [place] with his first wife for many years before they returned to the [facility].  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  He tells me that his current social contacts are primarily friends of his current wife, as his friends of many years have scattered back to their home areas after completing their construction contracts.      MEDICAL HISTORY:  As above in HPI.  Mr. Churchill has received all of his medical care and current workups prior to this admission at [facility] through [facility] in the [facility].  There is a great deal of information available in the Care Everywhere section of his electronic medical record.  Please see his [facility] electronic medical record for more complete information on his medical history, current admission and status, and all medications.      PSYCHIATRIC HISTORY:  As above in HPI.  As noted above, Mr. Churchill' family apparently provided information to the Neurology team on unit 6A regarding observations of behavioral and personality changes; however, this information was not available at the time of this evaluation.  No other significant psychiatric history was available at the time of this evaluation.      BEHAVIORAL IMPRESSIONS:  Mr. Churchill presented for this evaluation lying in bed in his room on the AR[facility] with his lights off.  When I entered, he immediately turned  "them on and sat up in bed to engage in conversation.  He reported his mood as \"fine\" and exhibited an affect that was primarily euthymic with an episode of lability with tears when he spoke about the death of his first wife and the stress of those years of her illness.  It appeared to be very important to him to emphasize that he accepts uncertainty and that he never dwells on fears about the future. Speech was clear and coherent.  Insight and judgment appeared to be intact.  He exhibited no evidence of distortions of thought or perception.      IMPRESSIONS AND PLAN:  Ry Churchill is a 63-year-old man currently on the [facility].  Mr. Churchill has experiences progressive lower extremity weakness and  weakness over approximately 15 months with the current differential diagnosis awaiting pathology results; the differential diagnosis is amyotrophic lateral sclerosis versus chronic inflammatory demyelinating polyneuropathy versus multifocal motor neuropathy.  Mr. Churchill appeared to me to be minimizing any emotional distress, often responding to my questions by stating with a shrug, \"what would it help to be upset\" with a sense of resignation in his tone of voice.  He acknowledged experiencing anxious thinking and not feeling rested when he awakens in the morning.  He had a strong emotional reaction when talking about his first wife's death, which was 12 years ago.  I do have some concerns about the possibility that Mr. Churchill is minimizing his emotional reactions, especially in the context of reports from his family to Neurology providers that they have concerns as well.  Mr. Churchill was open to additional contact with Health Psychology, as well as having contact with our unit .  I will attempt to gain more insight into the emotional reactions that Mr. Churchill is experiencing to assess any concerns and provide guidance to his family and to him.      DIAGNOSIS: Adjustment disorder " with mixed anxiety and depressed mood (F43.23).         BOUCHRA BARCENAS, PHD, LP        *In accordance with the Rules of the [place] Board of Psychology, it is noted that psychological descriptions and scientific procedures underlying psychological evaluations have limitations.  Absolute predictions cannot be made based on information in this report.     D: 2018   T: 2018   MT: CASEY      Name:     LAURA POWERS   MRN:      [id number]        Account:       [id number]   :      1955           Consult Date:  2018      Document: [id number]       cc: DALIA Batista MD

## 2018-12-19 NOTE — PLAN OF CARE
OT: Focus on stability with symmetric standing and UE release from walker. Pt demo SBA/CGA SPT with fww. Requiring CGA when releasing UE from fww during activity, anxious throughout and requiring encouragement to increase WB through RLE. Amb 25' in hallway with fww but pt very hesitant to complete any ambulation in room due to weakness and imbalance.

## 2018-12-19 NOTE — PLAN OF CARE
PT: pt is progressing well, still limited by weakness in RLE and general fatigue. Pt is IND for all bed mob, and SBA for sit to stand to WW for standing support. Pt amb up to 60'x 4 with WW needing V.c for increased WB on RLE. Pt demo wt shifting to RLE.

## 2018-12-19 NOTE — PLAN OF CARE
Pt a&ox4. Denied pain this shift. Slept throughout night, calling to use urinal x1 with assist. Per report pt ambulates with ao1 and a walker. No overnight concerns at this time.

## 2018-12-20 NOTE — PLAN OF CARE
FOCUS/GOAL  Bowel management, Bladder management, Wound care management, Discharge planning and Mobility    ASSESSMENT, INTERVENTIONS AND CONTINUING PLAN FOR GOAL:       Patient is A&O, continent of bowel/bladder. No BM this shift. Right ankle biopsy site covered with gauze dressing, no visible drainage. Patient transfers Ax1 with a walker. Stands at bedside to use a urinal with staff assist at [facility]. No c/o pain, SOB or n/v/d. Patient is discharging to home 12/24.  Continue POC.

## 2018-12-20 NOTE — PLAN OF CARE
Discharge Planner OT   Patient plan for discharge: Home with continued therapy  Current status: Pt able to complete functional mobility around gym in tight spaces to simulate home environment with CGA using FWW; tolerated standing bouts with functional activity - difficulty with BUE release due to unsteadiness.  Barriers to return to prior living situation: weakness, impaired balance  Recommendations for discharge: Home with home OT/PT  Rationale for recommendations: to increase pt's (I) with ADL/IADLs       Entered by: June Shaw 12/20/2018 4:30 PM

## 2018-12-20 NOTE — PLAN OF CARE
Alert and oriented X 4. Able to make needs known. Call light in reach. Offers no C/O pain/discomfort during night. Right ankle dressing CDI. Using the urinal at bedside, staff empties. Last BM on 12/19. Appears to be sleeping during rounds. Continue with plan of care.

## 2018-12-20 NOTE — PLAN OF CARE
PT: pt demo Nustep up to 15 min on level 3 at 70 steps per min. Pt stating 4 on OMNI scale. Pt demo short amb up to 40'x 4 with WW. Pt demo up and down 2 steps x 1 with 2 rails. Needing CGA to min A for all.

## 2018-12-20 NOTE — PROGRESS NOTES
"[facility]   Acute Rehabilitation Unit  Daily progress note      INTERVAL HISTORY  Pt seen bedside. No acute overnight events. Reports that he is doing well. He has no new complaints. No increased pain to sural nerve biopsy site. No new numbness, tingling, weakness. Reports he had a BM yesterday. Functionally, ambulating up to 60' with WW.     MEDICATIONS    Scheduled Medications:      amoxicillin-clavulanate  1 tablet Oral Q8H MARISA     brimonidine  1 drop Both Eyes BID     dorzolamide-timolol  1 drop Both Eyes BID     hydrochlorothiazide  25 mg Oral Daily     latanoprost  1 drop Both Eyes At Bedtime     levothyroxine  125 mcg Oral QAM AC     lisinopril  20 mg Oral BID        PRN Medications:    acetaminophen, bisacodyl, melatonin, polyethylene glycol, senna-docusate     OBJECTIVE    /85   Pulse 79   Temp 95.9  F (35.5  C) (Oral)   Resp 20   Ht 1.778 m (5' 10\")   Wt 141.2 kg (311 lb 4.8 oz)   SpO2 93%   BMI 44.67 kg/m      PHYSICAL EXAM:  Gen: NAD, pleasant   Respiratory: non-labored breathing on room air  Extremities: No significant edema - dressing intact at R ankle intact, no drainage through dressing  Neurological: Alert, converses appropriately. Antigravity in all extremities except RLE.       New labs/imaging:  No new today     ASSESSMENT/PLAN    Mr Churchill is a 62 y/o man with who has had progressive lower extremity weakness (R>L) and decreased  strength x ~15 months with broad neurological work-up and unclear cause currently (possibly ALS vs CIDP vs MMN). Admitted to ARU 12/14/2018.      Functional Deficits: RLE>LLE weakness. Decreased  strength bilaterally (R>L).      Rehab admission code: 03.8 neuromuscular disorder        PT, OT 90 minutes of each on a daily basis, in addition to rehab nursing and close management of physiatrist.      --Impairment of ADL's: OT for 90 minutes daily to work on ADL re-training such as grooming, self-cares, and " bathing  -- Impairment of Mobility: PT for 90 minutes daily to work on neuromuscular re-education focusing on strength, balance, coordination, and endurance.   -- Rehab RN for med administration, bowel regimen, glucose monitoring, and wound care  -- Adjustment to disability: Health psychology consult.      Medical Management:     #Progressive Weakness (RLE>LLE), decreased  strength: Has had broad work-up, including initial lumbar surgery without improvement, EMG, and LP with no clear cause (possibly ALS vs CIDP vs MMN). Received IVIG (12/6 - 12/10) with possibly some improvement in  strength  -- Sural nerve biopsy, 12/11. Results pending  -- Multiple antibody panels pending  -- Follow-up with neuromuscular clinic after rehab, scheduled with Dr. Collins 12/26     #? Cellulitis surrounding sural nerve biopsy site. Labs (CBC w/ diff, ESR, CRP) 12/17 reassuring.   -- Continue Augmentin x 7 days (started 12/16)  -- Monitor    #HTN: Switched PTA Lisinopril 40 mg daily to 20 mg BID 12/18 b/c BP's were higher in the AM. BPs now improved in AM, monitor.   -- Continue Lisinopril 20 mg BID  -- Continue PTA hydrochlorothiazide     #Hypothyroidism:  -- Continue PTA Synthroid     #Glaucoma:  -- Continue PTA eyedrops         Diet: Regular  Bladder: PVRs completed and acceptable.   Bowel: PRN medications available  DVT ppx: SCDs.   Code Status: Full, discussed on admission  Dispo: ELOS 7-10 days. Plan to discharge home with support of wife. 7 steps to main floor may be a challenge.         Follow up Appointments on Discharge:  Neuromuscular clinic  PCP    Staffed with Dr. Green, who agrees with plan.     Crhis Fagan MD  PM&R PGY-2        I, Samantha Green, saw this patient with my resident Dr. Fagan and agree with his findings and plan of care as documented in his note.     I personally reviewed the chart (vitals signs, medications, and labs).     My key findings and pollard manege ment decisions made by me:   No new  issues. Deo is making slow progress; ambulating short distances. His main barrier is stairs and it's challenging given his significant RLE weakness. Goal is to discharge to home on Monday but pending his progress by then. Will plan for family training this weekend.       I spent a total of 30 minutes face-to-face and managing the care of Ry Crespo Kerlinetia. Over 50% of my time on the unit was spent counseling the patient and coordinating care. See note for details.

## 2018-12-20 NOTE — PLAN OF CARE
Acute Rehab Care Conference/Team Rounds      Type: Team Rounds    Present: Dr Samantha Green, Chris Fagan Resident, Joy Brumfield LICSW, Ayla Arciniega OT, Sherry Dixon PT, Jaky Mendes SLP, Lawrence Torrez RN, Petra Cao Dietician, Mahogany Cortez , Quintin-O Bang         Discharge Barriers/Treatment/Education    Rehab Diagnosis: progressive weakness of bilateral lower extremities and hands, along with sensory loss - no clear diagnosis at this point     Active Medical Co-morbidities/Prognosis: HTN, hypothyroidism     Safety: Alert and oriented X 4. Able to make needs known. Call light in reach. Mod I in room with walker.    Pain: Offers no C/O pain during night    Medications, Skin, Tubes/Lines:Skin is intact, no lines or tubes present.    Swallowing/Nutrition: on regular diet     Bowel/Bladder: Continent of bowel and bladder. Last BM on 12/19.Using urinal at bedside at [facility]    Psychosocial:  Pt sos a 63 year old male admitted to ARU for neuromuscular disorder.  Pt. Reported that he lives with his wife and have 2 [place]  Pt and family goal to return home or rent accessible apt. Closer to the [facility] area.  Team working towards a safe discharge.     ADLs/IADLs: SBA for ADL except for shoe and AFO. Pt may need assist with this since we are recommending he wear it at all times when up. At this time pt unable to lift leg to simulate shower transfers and this also impacts ability to do stairs. Pt is set up with DME but does need a small bariatric shower chair. Pt will need assist with IADL. Recommend HH OT at discharge.     Mobility: up in room w/ ww and SBA.  Progressing toward household mobility w/ ww, anticipate Mod I in room 1-2 days.  Stairs currently a barrier for discharge as he is very heavily reliant on UE's and has only a single rail available at home.  Trunk and leg weakness a factor in this.     Cognition/Language: wnl    Community  Re-Entry:   Community re-entry w/c based w/ assist  Transportation: Not a  due to LE weakness, car transfer not a barrier    Decision maker: self    Plan of Care and goals reviewed and updated.    Discharge Plan/Recommendations    Fall Precautions: continue     Overall plan for the patient: Deo is making slow progress; ambulating short distances. His main barrier is stairs and it's challenging given his significant RLE weakness. Goal is to discharge to home on Monday but pending his progress by then. Will plan for family training this weekend.       Utilization Review and Continued Stay Justification    Medical Necessity Criteria:    For any criteria that is not met, please document reason and plan for discharge, transfer, or modification of plan of care to address.    Requires intensive rehabilitation program to treat functional deficits?: Yes    Requires 3x per week or greater involvement of rehabilitation physician to oversee rehabilitation program?: Yes    Requires rehabilitation nursing interventions?: Yes    Patient is making functional progress?: Yes    There is a potential for additional functional progress? Yes    Patient is participating in therapy 3 hours per day a minimum of 5 days per week or 15 hours per week in 7 day period?:Yes    Has discharge needs that require coordinated discharge planning approach?:Yes      Final Physician Sign off    Statement of Approval: I agree with all the recommendations detailed in this document.      Patient Goals  OT Frequency: Daily  OT goal: hygiene/grooming: modified independent, while standing  OT goal: upper body dressing: including set-up/clothing retrieval, Modified independent  OT goal: lower body dressing: Modified independent, including set-up/clothing retrieval, including orthotic, using adaptive equipment, within precautions  OT goal: upper body bathing: Modified independent, using adaptive equipment, within precautions  OT goal: lower body bathing:  Modified independent, with precautions, using adaptive equipment  OT goal: bed mobility: Modified independent, rolling, bridging, within precautions, supine to/from sitting  OT Goal: transfer: Modified independent, with assistive device  OT goal: toilet transfer/toileting: Modified independent, cleaning and garment management, using adaptive equipment, toilet transfer  OT goal: meal preparation: with simple meal preparation, Modified independent  OT goal: home management: Modified independent, with light demand household tasks  OT goal: cognitive: Patient/caregiver will verbalize understanding of cognitive assessment results/recommendations as needed for safe discharge planning  OT goal: perform aerobic activity with stable cardiovascular response: continuous activity, ambulation, 15 minutes  OT goal 1: OT: pt can don/doff ankle orthotic with mod I while seated.  OT/LEO face-to-face collaboration occurred, patient progress and plan of care reviewed.    PT Frequency: daiily x 90 minutes  PT goal: bed mobility: (met)  PT goal: transfers: Modified independent  PT goal: gait: Modified independent, Rolling walker, 50 feet  PT goal: stairs: Modified independent, 7 stairs, Rail on right(backward descent, step to pattern)  PT goal: perform aerobic activity with stable cardiovascular response: continuous activity, NuStep, 20 minutes   PT goal 1: car transfer ind  PT Goal 2: (met)  PT Goal 3: participate in falls prevention education    Nursing goals   Wound care education before discharge  Pain is not an issue  Continent of bowel and bladder

## 2018-12-20 NOTE — PLAN OF CARE
Focus: Physio  D: Alert and orientated times four. Baseline right foot numbness and foot drop. Wears AFO. He states that wife applies this at home. No pain. Continent of bowel and bladder. Last bm 12-19-18. Is slated for discharge to home on 12-24-18 but this will depend on him being able to safely do stairs. P: Continue current plan of care.

## 2018-12-21 NOTE — PLAN OF CARE
Skilled set up on :  Pt dependent for proper placement of electrodes on R quads, hams, tib ant and gastroc for optimum muscle contraction, safe positioning on w/c within frame and cushion for prevention of skin breakdown, feet secured to foot pedals, w/c secured to  w/ Q-straints.  Passive motion assessed to ensure proper positioning.  Determined appropriate FES parameters for each muscle group based off of strong tetanic response of muscle test.     Pt performed 25 minutes of active FES ergometry with 0-100% stimulation applied to above muscles at 45 rpm with 0.5-0.77 nm resistance.  This PT adjusted e-stim and cycling parameters in real-time to ensure palpable muscle contractions throughout session.  Please see [email or web address] for further details on patient's stimulation parameters and ergometry outcomes.  Patient ID: [id number]  Password: [id number]

## 2018-12-21 NOTE — PLAN OF CARE
FOCUS/GOAL  Pain management, Wound care management, Mobility, Skin integrity and Safety management    ASSESSMENT, INTERVENTIONS AND CONTINUING PLAN FOR GOAL:  Pt is A&Ox4 and is a 1x assist w/ walker and gait belt. Pt denied any pain this shift.Pt walked to toilet and urinated w/ minimal assistance this shift. Pt has limited mobility of L foot and weakness of R foot, both LE have numbness. Pt also has a incision site to R ankle that hd dressing changed this shift, no bleeding/drainage, swelling or other Sx of infection. Pt had controlled fall to knees during therapy today, no injuries observed and Pt denies any pain or concerns r/t fall.

## 2018-12-21 NOTE — PLAN OF CARE
FOCUS/GOAL  Bowel management, Bladder management, Skin integrity and Cognition/Memory/Judgment/Problem solving    ASSESSMENT, INTERVENTIONS AND CONTINUING PLAN FOR GOAL:  Pt is alert and oriented, denied pain, sob, or new changes in sensation, independently repositioning in bed, continent of bowel and bladder overnight, no further care concerns at this time continue with POC.

## 2018-12-21 NOTE — PROGRESS NOTES
"[facility]   Acute Rehabilitation Unit  Daily progress note      INTERVAL HISTORY  Pt seen bedside. Reports that he is doing well. Wife was taught dressing change last night. We did discuss that his BP is often elevated. We discussed increasing hydrochlorothiazide 25 mg daily to BID; pt amenable to a trial of this.     Pt did later have a quite slow fall down to his knees during OT in the shower. He denied any pain to his knees. No head trauma.     Functionally, the pt continues to have difficulty with stairs, which he will need to navigate at home. Wife will come tomorrow during therapy session.       MEDICATIONS    Scheduled Medications:      amoxicillin-clavulanate  1 tablet Oral Q8H MARISA     brimonidine  1 drop Both Eyes BID     dorzolamide-timolol  1 drop Both Eyes BID     hydrochlorothiazide  25 mg Oral BID     latanoprost  1 drop Both Eyes At Bedtime     levothyroxine  125 mcg Oral QAM AC     lisinopril  20 mg Oral BID        PRN Medications:    acetaminophen, bisacodyl, melatonin, polyethylene glycol, senna-docusate         OBJECTIVE    /85 (BP Location: Left arm)   Pulse 66   Temp 97.7  F (36.5  C) (Oral)   Resp 16   Ht 1.778 m (5' 10\")   Wt 140.5 kg (309 lb 11.2 oz)   SpO2 93%   BMI 44.44 kg/m      PHYSICAL EXAM:  Gen: NAD, pleasant. Lying in bed  Respiratory: non-labored breathing on room air  Extremities: No significant edema. Incision evaluated to RLE, c/d/i. Well-approximated. No drainage. Minimal surrounding redness. No swelling or warmth.   Neurological: Alert, converses appropriately. Antigravity in all extremities except RLE.       New labs/imaging:  No new today         ASSESSMENT/PLAN    Mr Churchill is a 64 y/o man with who has had progressive lower extremity weakness (R>L) and decreased  strength x ~15 months with broad neurological work-up and unclear cause currently (possibly ALS vs CIDP vs MMN). Admitted to ARU 12/14/2018.      Functional " Deficits: RLE>LLE weakness. Decreased  strength bilaterally (R>L). Sensory loss.      Rehab admission code: 03.8 neuromuscular disorder     PT, OT 90 minutes of each on a daily basis, in addition to rehab nursing and close management of physiatrist.      --Impairment of ADL's: OT for 90 minutes daily to work on ADL re-training such as grooming, self-cares, and bathing  -- Impairment of Mobility: PT for 90 minutes daily to work on neuromuscular re-education focusing on strength, balance, coordination, and endurance.   -- Rehab RN for med administration, bowel regimen, glucose monitoring, and wound care  -- Adjustment to disability: Health psychology consult.      Medical Management:     #Progressive Weakness (RLE>LLE), decreased  strength: Has had broad work-up, including initial lumbar surgery without improvement, EMG, and LP with no clear cause (possibly ALS vs CIDP vs MMN). Received IVIG (12/6 - 12/10) with possibly some improvement in  strength  -- Sural nerve biopsy, 12/11. Results pending  -- Multiple antibody panels pending  -- Follow-up with neuromuscular clinic after rehab, scheduled with Dr. Collins 12/26     #? Cellulitis surrounding sural nerve biopsy site. Labs (CBC w/ diff, ESR, CRP) 12/17 reassuring.   -- Continue Augmentin x 7 days (started 12/16)  -- Monitor    #HTN: Switched PTA Lisinopril 40 mg daily to 20 mg BID 12/18 b/c BP's were higher in the AM. Pt continues to have higher BPs.   -- Continue Lisinopril 20 mg BID  -- Increased hydrochlorothiazide 25 mg daily to 25 mg BID     #Fall. Mechanical. Slowly lowered to floor during shower. No injury/head trauma.     #Hypothyroidism:  -- Continue PTA Synthroid     #Glaucoma:  -- Continue PTA eyedrops         Diet: Regular  Bladder: PVRs completed and acceptable.   Bowel: PRN medications available  DVT ppx: SCDs.   Code Status: Full, discussed on admission  Dispo: Tentative discharge date 12/24. Plan to discharge home with support of wife. 7  steps to main floor appears to be the main challenge. Wife to come observe therapies tomorrow 12/22 to help determine how feasible discharge on 12/24 would be.        Follow up Appointments on Discharge:  Neuromuscular clinic  PCP    Staffed with Dr. Green, who agrees with plan.     Chris Fagan MD  PM&R PGY-2      I, Samantha Green, saw this patient with my resident Dr. Fagan and agree with his findings and plan of care as documented in his note.     I personally reviewed the chart (vitals signs, medications, and labs).     My key findings and pollard manege ment decisions made by me:   Doing well. Wound is healing well. No s/s of infection. Will complete course of antibiotic.     He has difficulty navigating stairs; also had a lowering down to floor with OT when trying to transfer into the shower. Limited insight to his deficits. Will practice stairs with his family tomorrow. care conference on Monday and he wants to get discharged by then but safety is questionable unless his family members can assist.     I spent a total of 25 minutes face-to-face and managing the care of Ry Churchill. Over 50% of my time on the unit was spent counseling the patient and coordinating care. See note for details.

## 2018-12-21 NOTE — PLAN OF CARE
FOCUS/GOAL  Bowel management, Bladder management, Wound care management and Discharge planning    ASSESSMENT, INTERVENTIONS AND CONTINUING PLAN FOR GOAL:       Patient is A&Ox4, continent of bowel/bladder. No BM this shift. Right ankle biopsy site covered with gauze dressing, no visible drainage. Writer taught patient's wife how to clean/dress the incision site tonight. Patient transfers Ax1 with a walker. No c/o pain, SOB or n/v/d. Discharge planned for 12/24 to home.  Continue POC.

## 2018-12-21 NOTE — PLAN OF CARE
OT: Pt tried simulating shower transfer as he would be doing at home. Pt unable to support weight when stepping over threshold and needed to be lowered slowly to his knees. Pt had no injuries but it did take multiple staff to assist with getting him back to standing. Do no recommend pt step into walk in shower at home. Pt is recommended to get a bariatric shower chair and do a sit and pivot transfer. Will need to practice this too before discharge.

## 2018-12-22 NOTE — PLAN OF CARE
OT: Discussion with pt and wife regarding pt's progress and level of assist that he would need at home with ADL and modification to routine. Also educated wife on recommendation to not do a shower or tub transfer until HH therapy is there to assess environment and situation. Briefly talked about stairs being pt's biggest barrier to going home. Physical therapy addressing this further with family.

## 2018-12-22 NOTE — PROGRESS NOTES
12/20/18 2026   Visit Information   Visit Made By Staff    Type of Visit Initial;Staff consultation/triage   Interventions   Plan of Care Review   With interdisciplinary team  (Let staff know will see pt on Sat.)   SPIRITUAL HEALTH SERVICES   Spiritual Assessment Progress Note   [facility] ([facility] 5R   REFERRAL SOURCE: Epic consult.   On this visit, attempted to see pt, but he was unavailable.   PLAN: Will see Ry on  Saturday.    Rev. Erin Cuenca  Staff   Spiritual Health Services  Pager: [phone]  Office: [phone]  * [facility] remains available 24/7 for emergent requests/referrals, either by having the switchboard page the on-call  or by entering an ASAP/STAT consult in Epic (this will also page the on-call ).*

## 2018-12-22 NOTE — PLAN OF CARE
"Patient seen in PT.  Stairs were attempted with wife present. We discussed the stairwell at home.   Seven Steps are 7 inches, carpeted.  The first three steps there is a rail on the left and no rail on the right.  After the third step the bannister from landing with spindels serve as \"handles\" for him to pull self up on. The top landing has a grab bar on the left side. It was decided that he try the stairs with one rail.  CGA to go up two steps.  He was unsteady and weak so this writer made decision to have him go down and return to wheelchair. Patient felt he would do better if he had some exercise before trying the steps again - that he wasn't warmed up.  After exercise second attempt at at stairs.  Patient able to pull self up slowly step-to with heavy use of UE all four steps.  Instructed patient to come down.  Required CGA.  Patient stepped down with right leg and there was loss of balance resulting in the buckling of right knee and then left knee. Knees hit steps. With guidance patient able to scoot down the stairs and was assisted to standing and then back to the wheelchair.  At this time patient does not appear to be able to do stairs in a safe manner. Wife agreed. Wife stated she felt some more grab bars at the bottom of stairwell may be added to help him manage the stairs.  Patient was returned to room for nursing to continue the falls assessment. This writer had brief conversation with resident MD regarding the stairs.  "

## 2018-12-22 NOTE — PROGRESS NOTES
FOCUS/GOAL  Bladder management and Mobility    ASSESSMENT, INTERVENTIONS AND CONTINUING PLAN FOR GOAL:  Patient is alert and oriented x 4 and is able to make needs known by using call light and verbalizing needs.  Patient denies pain.  Is continent of bladder using urinal as well as toilet in bathroom.  No BM this shift.  Assist of 1 with gait belt and walker.  New order to increase hydrochlorothiazide to BID, patient aware and dose given at HS.

## 2018-12-22 NOTE — PROGRESS NOTES
[facility], [place]   Physical Medicine and Rehabilitation Daily Note           Assessment and Plan of Care:   Mr Churchill is a 64 y/o man with who has had progressive lower extremity weakness (R>L) and decreased  strength x ~15 months with broad neurological work-up and unclear cause currently (possibly ALS vs CIDP vs MMN). Admitted to ARU 12/14/2018.   -- BP more stable today after increase in HCTZ  -- Pt still hoping to discharge on 12/24 but stairs remain a challenge.   --Continue ongoing medical management.  --Continue therapies and plan of care.             Interval history:     His wife is coming today to work on stairs with him. He thinks he can crawl if needed. Notes that he has done 4 stairs already and can use rails at home. Wants to he home for arnol.     No injuries after his fall yesterday; no new pain. Reports regular BMs.             Physical Exam:     Vitals:    12/21/18 0734 12/21/18 1900 12/22/18 0751 12/22/18 1545   BP: 156/85 133/87 129/86 125/55   BP Location: Left arm Left arm Left arm Left arm   Pulse: 66 85 57 72   Resp: 16 16 16 18   Temp: 97.7  F (36.5  C) 96.5  F (35.8  C) 96  F (35.6  C) 96.5  F (35.8  C)   TempSrc: Oral Oral Oral Oral   SpO2: 93% 96% 94% 94%   Weight:       Height:         Gen: NAD, resting in bed  Resp: No increased WOB on RA.   Ext: No significant edema to BLE  MSK/neuro: alert and oriented. speech fluent. Moves all extremities, except RLE, volitionally         Data:   Scheduled meds    amoxicillin-clavulanate  1 tablet Oral Q8H MARISA     brimonidine  1 drop Both Eyes BID     dorzolamide-timolol  1 drop Both Eyes BID     hydrochlorothiazide  25 mg Oral BID     latanoprost  1 drop Both Eyes At Bedtime     levothyroxine  125 mcg Oral QAM AC     lisinopril  20 mg Oral BID       PRN meds:  acetaminophen, bisacodyl, melatonin, polyethylene glycol, senna-docusate      Staffed with Dr. Green, who agrees with plan    Chris Fagan  MD  PM&R PGY-2      I, Samantha Green, saw this patient with my resident Dr. Fagan and agree with his findings and plan of care as documented in his note.     I personally reviewed the chart (vitals signs, medications, and labs).     My key findings and pollard manege ment decisions made by me:   Long conversations today regarding his discharge plan, safety, issues with stairs and his wishes to go home. He is not safe for discharge at this point due to inability to safely navigate stairs; wife agreed with the plan. Care conference scheduled on Monday and will require TCU discharge to maximize his gains.     No new medical issues; BP in better range.     Samantha Green MD  Physical Medicine & Rehabilitation

## 2018-12-22 NOTE — PLAN OF CARE
FOCUS/GOAL  Bowel management, Bladder management, Skin integrity and Cognition/Memory/Judgment/Problem solving    ASSESSMENT, INTERVENTIONS AND CONTINUING PLAN FOR GOAL:  Pt is alert and oriented, denied pain, sob, or new changes in sensation overnight, slept well without issue, using urinal at the bedside, transferred to toilet in am with a of 1 and ww, no further care concerns continue with POC.

## 2018-12-22 NOTE — PLAN OF CARE
FOCUS/GOAL  Bladder management    ASSESSMENT, INTERVENTIONS AND CONTINUING PLAN FOR GOAL:  Pt denied pain during shift. Denied SOB, denied difficulty breathing. No neuro changes upon assessment. VSS. Using call light appropriately to express needs. Continent during shift, requesting assistance with urinal use. Call light within reach. Family visited today. Continue with POC.

## 2018-12-23 NOTE — PROGRESS NOTES
"[facility], [place]   Physical Medicine and Rehabilitation Daily Note           Assessment and Plan of Care:   Mr Churchill is a 62 y/o man with who has had progressive lower extremity weakness (R>L) and decreased  strength x ~15 months with broad neurological work-up and unclear cause currently (possibly ALS vs CIDP vs MMN). Admitted to ARU 12/14/2018.     -- BP in good range. No new labs today.   --Continue ongoing medical management. No change in meds.  --Continue therapies and plan of care.    Long conversation with Deo and his wife Teri today regarding the plan. He is clearly frustrated with his RLE weakness and in ability to go home. Insight to his deficits has improved but still minimized his falls and ongoing difficulty with stairs. After extended discussion agreed that home discharge is not safe with current setting until he improves or a chair lift is placed. Reviewed other options including TCU, or discharge to other family member's house. He was anxious about his appointment with Dr. Collins on Wed and \"would like to have an answer\". Path report is pending in [facility]. Will have care conference tomorrow and possibly send referral to TCU. Will benefit from longer course of rehab and if no improved there, should install a chair lift and continue his course at home vs outpatient.            Interval history:     Doing ok. Frustrated as above.             Physical Exam:     Vitals:    12/22/18 0751 12/22/18 1545 12/22/18 2110 12/23/18 0759   BP: 129/86 125/55 104/52 131/76   BP Location: Left arm Left arm Left arm Left arm   Pulse: 57 72     Resp: 16 18 18   Temp: 96  F (35.6  C) 96.5  F (35.8  C)  97  F (36.1  C)   TempSrc: Oral Oral  Oral   SpO2: 94% 94%  95%   Weight:       Height:         Gen: NAD, sitting in chair   Resp: No increased WOB on RA.   Ext: No significant edema to BLE  MSK/neuro: alert and oriented. speech fluent. Was seen ambulating with PT with R AFO and FWW. "          Data:   Scheduled meds    brimonidine  1 drop Both Eyes BID     dorzolamide-timolol  1 drop Both Eyes BID     hydrochlorothiazide  25 mg Oral BID     latanoprost  1 drop Both Eyes At Bedtime     levothyroxine  125 mcg Oral QAM AC     lisinopril  20 mg Oral BID       PRN meds:  acetaminophen, bisacodyl, melatonin, polyethylene glycol, senna-docusate      Samantha Green MD  Physical Medicine & Rehabilitation    I spent a total of 40 minutes face-to-face and managing the care of Ry Churchill. Over 50% of my time on the unit was spent counseling the patient and coordinating care. See note for details.

## 2018-12-23 NOTE — PLAN OF CARE
FOCUS/GOAL  Bladder management, Pain management, Skin integrity and Cognition/Memory/Judgment/Problem solving    ASSESSMENT, INTERVENTIONS AND CONTINUING PLAN FOR GOAL:  Pt is alert and oriented x4, denies fever, chills, CP, SOB, N/V, abdominal pain, or new weakness/numbness/tingling, continent of bowel and bladder, had med BM on toilet in am, needing assistance with osvaldo cares, reported feelings sad due to lack of progress with transferring, transferring with assist of 1 and ww, sleeping well throughout the night, no tubes, lines or drains, vs stable,  Dressing on RLE intact, no further care concerns at this time continue with POC.

## 2018-12-23 NOTE — PLAN OF CARE
Patient seen in PT to day for gait training, strengthening.  Stairs were not attempted.  Patient perfoems car transfer with set up assistance.  Ambulates 55 feet with FWW with CGA.  Required three standing restbreaks during ambulation and verbal cues to clear right foot during swing phase.  Fatigues easily.  Initiated HEP and issued hand out for standing exercises

## 2018-12-23 NOTE — PLAN OF CARE
FOCUS/GOAL  Bowel management, Bladder management, Medical management, Mobility, Skin integrity, Cognition/Memory/Judgment/Problem solving and Safety management    ASSESSMENT, INTERVENTIONS AND CONTINUING PLAN FOR GOAL:  Therapist told this RN that when the pt was  Having stair training that he had a fall he did not hit his head but he has redness on the left knee from hitting the stairs this staff went to the pt who was sitting on the w/c in the dinning room he said he feel ok  He said he did not hit his head , he said he had erdness on his left knee and his wife applied lotion and the redness disappear he denies pain Dr Green was updated of pt's fall .Pt has a lot of stairs at home and one of the barriers at this time is climbing the stairs .  Pt has been calling appropriately for assistance to go to the toilet CGA with transfer with walker and able to do osvaldo care , rt incision biopsy site has no drainage wound care was done and dressing was applied currently on po antibiotic for cellulitis redness on the rt ankle is better not warm to touch and is no longer red .  Will continue to monitor for safe transfer and ambulation in the room and continue with safe discharge plan .

## 2018-12-23 NOTE — PLAN OF CARE
Pt denied pain during shift. Denied SOB, denied difficulty breathing. No neuro changes upon assessment. VSS. Pt pleasant, calm, cooperative. Call light within reach. No impulsivity noted. R AFO brace worn with transfers. Continue with POC.

## 2018-12-23 NOTE — PLAN OF CARE
Discharge Planner OT   Patient plan for discharge: Home with assist vs TCU  Current status: CGA for short distance mobility using the walker and assist for LB dressing. SBA/CGA for other ADL.   Barriers to return to prior living situation: High fall risk, stairs, poor insight into deficits, not independent with basic mobility and physical deficits impacting safety  Recommendations for discharge: TCU vs home with assist  Rationale for recommendations: high fall risk, environmental limitations at home including stairs        Entered by: Ayla Arciniega 12/23/2018 12:09 PM

## 2018-12-24 NOTE — PROGRESS NOTES
SIMON spoke with Pt and his wife about TCU placement.  Pt reported that he would like to go to TCU in [place].  Their 2 choices would be [facility] or [facility].    Call to Joan at [facility].  She stated that they would have beds on Wednesday but couldn't get him in today.  She said to fax the referral [phone].    Call to Samantha at [facility] [phone].  Samantha said that there is a bed available, but nobody to check insurance.  She said to send the referral and she will see if anyone is available to check it.  [phone].  SIMON faxed referral information to Samantha.    SIMON spoke with Pt and his wife and they decided that they would like to shoot for a Wednesday admit at [facility].  SIMON will follow up with admissions at [facility]    SIMON spoke with Samantha at [facility].  She said that she is just waiting to hear back from insurance, will check with her boss on one of Pt medications, but everything looks good for a Wednesday admit.  She said she will follow up with SIMON in a few minutes.    Call from Samantha, Pt is approved if he can pay the copay of $170.50 after 18 days and he is not on IVIG anymore.    SIMON consulted with doctor, Pt is not on IVIG.  SIMON consulted with Pt wife.  They are good with the copay.    SIMON called Samantha to complete the referral.  Pt is good to go Wednesday, she will just need the orders before then.  SIMON completed PAS.  Confirmation #[id number]    Pt will discharge to TCU Wednesday morning 12/26/18    RANJITH Brown, LICSW

## 2018-12-24 NOTE — PLAN OF CARE
FOCUS/GOAL  Medication management and Caregiver training    ASSESSMENT, INTERVENTIONS AND CONTINUING PLAN FOR GOAL:  Pt denied pain. VSS. Has BLE (R>L) weakness but able to lift both legs into bed from sitting. Independent with bed mobility. Needing CGA and walker for mobility in room. Wife was present and able to change right ankle dressing without difficulty. No drainage noted.   Pt applied eye drops self and verbalizes knowledge of medications.   Care conference is scheduled tomorrow at 11am, wife is aware of the plan.   Discharge planning is TCU vs Home with assist.

## 2018-12-24 NOTE — PROGRESS NOTES
"CLINICAL NUTRITION SERVICES - ASSESSMENT NOTE     Nutrition Prescription    RECOMMENDATIONS FOR MDs/PROVIDERS TO ORDER:  None today    Malnutrition Status:    Pt does not meet 2 criteria for dx malnutrition.    Recommendations already ordered by Registered Dietitian (RD):  -Odered Room Service Appropriate    Future/Additional Recommendations:  -Monitor po intakes and weight trends.  If po intakes decrease, consider addition of snacks or supplements.     REASON FOR ASSESSMENT  Ry Churchill is a/an 63 year old male assessed by the dietitian for LOS    NUTRITION HISTORY  Per chart review, pt was assessed for LOS by hospital RD 12/13.  Noted to be tolerating diet and eating 100%.    Per pt usual intake of 3 meals per day plus snacks between.  He denies any problems with intakes or weight changes PTA.    CURRENT NUTRITION ORDERS  Diet: Regular  No Room Service Order    Intake/Tolerance: PO intakes have been 100% of most recorded meals since admission.  Per review of selections in [facility], pt is generally ordering tid meals.  He has been getting some outside food brought in recently.  Pt appears to be ordering more than earlier in [facility] admission.  He reports weight loss since hospitalization which he attributes to previous decreased appetite and not snacking here.      LABS  Labs reviewed    MEDICATIONS  Medications reviewed    ANTHROPOMETRICS  Height: 177.8 cm (5' 10\")  Most Recent Weight: 140.5 kg (309 lb 11.2 oz)    IBW: 166 lbs  BMI: Obesity Grade III BMI >40  Weight History:  Per chart review, hospital admission weight (12/6):  319 lbs 3.2 oz.  Based on last available weight appears to have lost ~ 9 lbs or 3%.  Wt Readings from Last 10 Encounters:   12/21/18 140.5 kg (309 lb 11.2 oz)   12/07/18 144.8 kg (319 lb 3.2 oz)   12/06/18 145.6 kg (321 lb)   11/15/18 146.1 kg (322 lb 1.6 oz)       Dosing Weight: 92 kg (adjusted for >120% IBW)    ASSESSED NUTRITION NEEDS  Estimated Energy Needs: 4010-1773 " kcals/day (20 - 25 kcals/kg)  Justification: Obese  Estimated Protein Needs:  grams protein/day (1 - 1.2 grams of pro/kg)  Justification: Maintenance  Estimated Fluid Needs:  (1 mL/kcal)   Justification: Per provider pending fluid status    PHYSICAL FINDINGS  See malnutrition section below.    MALNUTRITION  % Intake: decreased intake does not meet criteria  % Weight Loss: Weight loss does not meet criteria  Subcutaneous Fat Loss: None observed  Muscle Loss: None observed  Fluid Accumulation/Edema: None noted  Malnutrition Diagnosis: Patient does not meet two of the above criteria necessary for diagnosing malnutrition    NUTRITION DIAGNOSIS  Predicted suboptimal nutrient intakes (energy/protein) related to currently with improving intakes, but potential for decline due to LOS.      INTERVENTIONS  Implementation  Nutrition Education: Encouraged intakes of tid well balanced meals.  Encouraged intakes of adequate fluid and high fiber foods to help promote regular bowel function.    Goals  Patient to consume % of nutritionally adequate meal trays TID, or the equivalent with supplements/snacks.     Monitoring/Evaluation  Progress toward goals will be monitored and evaluated per protocol.    Argenis Castaneda RD, LD

## 2018-12-24 NOTE — CARE CONFERENCE
Acute Rehab Care Conference/Team Rounds      Type: Patient Conference    Present: Samantha Green MD, Chris Fagan MD, Ry Churchill, Pt. Ayla Arciniega, OT, Sherry Dixon, PT, VIJAY Brown, Rebeca Duffy, RN      Discharge Barriers/Treatment/Education    Rehab Diagnosis: progressive weakness of bilateral lower extremities and hands, along with sensory loss - no clear diagnosis at this point      Active Medical Co-morbidities/Prognosis: HTN, hypothyroidism     Safety: Pt is alert and oriented x 4, transferring with AFO, walker, and gait with assist of 1, using call light appropriately.      Pain: Pt denies pain.     Medications, Skin, Tubes/Lines: Pt takes medications whole with water, dressing on posterior RLE CDI, wife has been educated on this dressing and observed successfully completing task, no tubes/lines/or drains.     Swallowing/Nutrition: on regular diet     Bowel/Bladder: Pt is continent of bowel and bladder, transfers to toilet to void and defecate, LBM 12/23/18.     Psychosocial:  Pt sos a 63 year old male admitted to ARU for neuromuscular disorder.  Pt. Reported that he lives with his wife and [place]  Pt and family looking at TCU.  Team working towards a safe discharge.    ADLs/IADLs: Pt is CGA for hyg at the sink, CGA for toileting, set up for UB dressing, MIN A for LB dressing, CGA for short distance mobility with walker and AFO. Pt has made limited progress and is limited by RLE weakness, poor insight and poor endurance. Barriers to discharge home are stairs for which pt is unable to navigate safely, pt is a fall risk. At this time pt would need assist with mobility and set up of ADL. Pt would need assist with all IADL. Pt is not recommended to do a shower transfer at home without having a HH therapist problem solve set up. Ultimately recommend TCU vs home as pt is currently a high fall risk with his current abilities and home  environment.    Mobility: pt motivated, participates well. Progress limited d/t weakness, legs buckling during shower transfer, on stairs. Pt lives in split level home. Pt to benefit from moving to accessible home or adding stair lift. Currently CGA for gait using FWW and RAFO. Has had falls on stairs. Recommend ogoing in-pt rehab in TCU setting closer to home.     Cognition/Language: baseline    Community Re-Entry:  based    Transportation: will need assist, transfer not a barrier    Decision maker: self    Plan of Care and goals reviewed and updated.    Discharge Plan/Recommendations    Fall Precautions: continue    Overall plan for the patient: reviewed the plan as discussed yesterday. He is not safe to discharge to home due to ongoing RLE weakness, and instability. Unable to navigate stairs or walk w/p frequent rest breaks. Would benefit from longer rehab course to maximize his functional gains. If fails to improve, should install a chair lift and continue his course at home. F/u with neurology on Wed as scheduled.       Utilization Review and Continued Stay Justification    Medical Necessity Criteria:    For any criteria that is not met, please document reason and plan for discharge, transfer, or modification of plan of care to address.    Requires intensive rehabilitation program to treat functional deficits?: Yes    Requires 3x per week or greater involvement of rehabilitation physician to oversee rehabilitation program?: Yes    Requires rehabilitation nursing interventions?: Yes    Patient is making functional progress?: Yes    There is a potential for additional functional progress? Yes    Patient is participating in therapy 3 hours per day a minimum of 5 days per week or 15 hours per week in 7 day period?:Yes    Has discharge needs that require coordinated discharge planning approach?:Yes      Final Physician Sign off    Statement of Approval: I agree with all the recommendations detailed in this  document.      Patient Goals     OT goal: hygiene/grooming: modified independent, while standing  OT goal: upper body dressing: including set-up/clothing retrieval, Modified independent  OT goal: lower body dressing: Modified independent, including set-up/clothing retrieval, including orthotic, using adaptive equipment, within precautions  OT goal: upper body bathing: Modified independent, using adaptive equipment, within precautions  OT goal: lower body bathing: Modified independent, with precautions, using adaptive equipment  OT goal: bed mobility: Modified independent, rolling, bridging, within precautions, supine to/from sitting  OT Goal: transfer: Modified independent, with assistive device  OT goal: toilet transfer/toileting: Modified independent, cleaning and garment management, using adaptive equipment, toilet transfer  OT goal: meal preparation: with simple meal preparation, Modified independent  OT goal: home management: Modified independent, with light demand household tasks  OT goal: cognitive: Patient/caregiver will verbalize understanding of cognitive assessment results/recommendations as needed for safe discharge planning  OT goal: perform aerobic activity with stable cardiovascular response: continuous activity, ambulation, 15 minutes  OT goal 1: OT: pt can don/doff ankle orthotic with mod I while seated. (Discontinued)  OT/LEO face-to-face collaboration occurred, patient progress and plan of care reviewed.    PT Frequency: daiily x 90 minutes  PT goal: bed mobility: (met)  PT goal: transfers: Modified independent  PT goal: gait: Modified independent, Rolling walker, 50 feet   goal discontinues at this time d/t safety concerns  PT goal: perform aerobic activity with stable cardiovascular response: continuous activity, NuStep, 20 minutes  PT goal 1: car transfer ind  PT Goal 2: (met)  PT Goal 3: participate in falls prevention education      Nursing goals  Goal: Wound Management: Wife will demonstrate  wound care dressing change prior to discharge  Goal: Mobility: Pt will demonstrate proper technique of applying AFO to RLE prior to transfers, for safety purposes as well  Goal: Safety Management: Pt will use call light appropriately to advocate for needs and assistance prior to transfers, demonstrating by end of shift.

## 2018-12-24 NOTE — PLAN OF CARE
Physical Therapy Discharge Summary    Reason for therapy discharge:    Discharged to transitional care facility.    Progress towards therapy goal(s). See goals on Care Plan in [facility] electronic health record for goal details.  Goals not met.  Barriers to achieving goals:   discharge from facility.    Therapy recommendation(s):    Continued therapy is recommended.  Rationale/Recommendations:  to maximize independence with functional mobility, decrease care giver burden, equipment and home modification planning.    Barriers to returning home include JEANN[place] home, will likely need stair lift.     Pt had falls while in ARU-  while practicing shower transfer, also while working on stairs.

## 2018-12-24 NOTE — PLAN OF CARE
Occupational Therapy Discharge Summary    Reason for therapy discharge:    Discharged to transitional care facility.    Progress towards therapy goal(s). See goals on Care Plan in [facility] electronic health record for goal details.  Goals not met.  Barriers to achieving goals:   discharge from facility. Pt not safe to go home due to lack of ability to do stairs, shower transfers, not independent with transfers or mobility.     Therapy recommendation(s):    Continued therapy is recommended.  Rationale/Recommendations:  Recommend continued therapy to work on strength, endurance for improvement of ADL and functional mobility, decrease caregiver burden and to assist with determining home modifications.

## 2018-12-24 NOTE — PLAN OF CARE
FOCUS/GOAL  Wound care management and Discharge planning    ASSESSMENT, INTERVENTIONS AND CONTINUING PLAN FOR GOAL:  Pt denied pain during shift. Denied SOB, denied difficulty breathing. No neuro changes, denied numbness or tingling. VSS. Shower this AM with OT. Care conference today with patient and wife present. Plan to transfer to TCU post ARU for further therapy. Wife demonstrated ability to complete dressing change - educated on scenarios for dressing to be applied, healing well, scabbed. Social work setting up TCU referrals today for discharge either today 12/24 or Wed 12/26 by 0800. Due to holiday 12/25. Pt using call light appropriately. No impulsivity noted. Walker used for transfers. Pt and spouse acceptance of plan. Continue with POC and discharge planning.

## 2018-12-25 NOTE — PLAN OF CARE
FOCUS/GOAL  Bowel management, Bladder management, Pain management and Cognition/Memory/Judgment/Problem solving    ASSESSMENT, INTERVENTIONS AND CONTINUING PLAN FOR GOAL:  Pt is alert and oriented denied pain, sob, or new changes in sensation, sleeping well during the night, expecting to discharge early on 12/26, dressing cdi, continent of bowel and bladder, no further care concerns at this time continue with POC.

## 2018-12-25 NOTE — PROGRESS NOTES
"[facility], [place]   Physical Medicine and Rehabilitation Daily Note           Assessment and Plan of Care:   Mr Churchill is a 64 y/o man with who has had progressive lower extremity weakness (R>L) and decreased  strength x ~15 months with broad neurological work-up and unclear cause currently (possibly ALS vs CIDP vs MMN). Admitted to ARU 12/14/2018.     -- BP in good range. No new labs today.   --Continue ongoing medical management. No change in meds.  --Continue therapies and plan of care.    Formal care conference held today; please see separate document in Plan of Care tab for full details. Briefly, reviewed the plan as discussed yesterday. He is not safe to discharge to home due to ongoing RLE weakness, and instability. Unable to navigate stairs or walk w/p frequent rest breaks. Would benefit from longer rehab course to maximize his functional gains. If fails to improve, should install a chair lift and continue his course at home. F/u with neurology on Wed as scheduled.       Reviewed his meds and completed discharge orders. Plan to discharge from ARU on Wed am [place] [facility] [place]; then admission to TCU in [place] (has been accepted).            Interval history:     Doing well. Slept ok last night. No new pain or discomfort. Wife present and supportive. She was pleased with his decision but noted that it was difficult for him to see his deficits and decide \"not to go home\".             Physical Exam:     Vitals:    12/24/18 0600 12/24/18 0758 12/24/18 1546 12/24/18 2030   BP: 142/85 123/80 114/63 120/80   BP Location: Left arm Left arm Left arm Left arm   Pulse: 61  53    Resp: 16  16    Temp: 96.1  F (35.6  C)  97.2  F (36.2  C)    TempSrc: Oral  Oral    SpO2: 96%  96%    Weight:       Height:         Gen: NAD, sitting in chair   Resp: No increased WOB on RA.   Ext: No significant edema to BLE  MSK/neuro: deferred for conversation           Data:   Scheduled " meds    brimonidine  1 drop Both Eyes BID     dorzolamide-timolol  1 drop Both Eyes BID     hydrochlorothiazide  25 mg Oral BID     latanoprost  1 drop Both Eyes At Bedtime     levothyroxine  125 mcg Oral QAM AC     lisinopril  20 mg Oral BID       PRN meds:  acetaminophen, bisacodyl, melatonin, polyethylene glycol, senna-docusate      Samantha Green MD  Physical Medicine & Rehabilitation    I spent a total of 40 minutes face-to-face and managing the care of Ry Churchill. Over 50% of my time on the unit was spent counseling the patient and coordinating care. See note for details.

## 2018-12-25 NOTE — DISCHARGE SUMMARY
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 Collins's clinic today on the day of discharge; still no clear diagnosis. 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 Ongoing work-up and management per neurology team.    # Cellulitis of lower limb at surgical incision for sural nerve biopsy   # HTN  # Hypothyroidism:  # Glaucoma        brief summary    Mr Churchill is a 64 y/o man with who has had progressive lower extremity weakness (R>L) and decreased  strength x ~15 months with broad neurological work-up and unclear cause currently (possibly ALS vs CIDP vs MMN). Admitted to ARU 12/14/2018.         rehabilitaiton course    ADLs/IADLs: Pt is CGA for hyg at the sink, CGA for toileting, set up for UB dressing, MIN A for LB dressing, CGA for short distance mobility with walker and AFO. Pt has made limited progress and is limited by RLE weakness, poor insight and poor endurance. Barriers to discharge home are stairs for which pt is unable to navigate safely, pt is a fall risk. At this time pt would need assist with mobility and set up of ADL. Pt would need assist with all IADL. Pt is not recommended to do a shower transfer at home without having a HH therapist problem solve set up. Ultimately recommend TCU vs home as pt is currently a high fall risk with his current abilities and home environment.     Mobility: pt motivated, participates well. Progress limited d/t weakness, legs buckling during shower transfer, on stairs. Pt lives in split level home. Pt to benefit from moving to accessible home or adding stair lift. Currently CGA for gait using FWW and RAFO. Has had falls on stairs. Recommend ogoing " "in-pt rehab in TCU setting closer to home.           mEDICAL COURSE    # Progressive Weakness (RLE>LLE) with sensory loss, decreased  strength: Has had broad work-up, including initial lumbar surgery without improvement, EMG, and LP with no clear cause (possibly ALS vs CIDP vs MMN). Received IVIG (12/6 - 12/10) with possibly some improvement in  strength.  Reviewed Dr. Collins's clinic today on the day of discharge; still no clear diagnosis. 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 Ongoing work-up and management per neurology team.    -- Sural nerve biopsy, 12/11. Results pending  -- Multiple antibody panels pending  -- Follow-up with neuromuscular clinic after rehab, scheduled with Dr. Collins 12/26     # Cellulitis surrounding sural nerve biopsy site. Has increased erythema, sweling and pain around the surgical incision on 12/16. Labs (CBC w/ diff, ESR, CRP) 12/17 reassuring. Was started on Augmentin x 7 days (started 12/16) and completed the course. Symptoms and signs improved and incision is healing well.       # HTN: Switched PTA Lisinopril 40 mg daily to 20 mg BID 12/18 b/c BP's were higher in the AM.   -- Continue Lisinopril 20 mg BID  -- Increased hydrochlorothiazide 25 mg daily to 25 mg BID       # Hypothyroidism:  -- Continue PTA Synthroid      # Glaucoma:  -- Continue PTA eyedrops         Diet: Regular  Bladder: PVRs completed and acceptable.   Bowel: PRN medications available  DVT ppx: SCDs.   Code Status: Full, discussed on admission      dISCHARGE MEDICATIONS  Discharge Medication List as of 12/26/2018  8:59 AM      START taking these medications    Details   acetaminophen (TYLENOL) 325 MG tablet Take 2 tablets (650 mg) by mouth every 4 hours as needed for mild pain or fever (> 101 F), Transitional      senna-docusate (SENOKOT-S/PERICOLACE) 8.6-50 MG tablet Take 1 tablet by mouth 2 times daily as needed for constipation, Disp-100 tablet, Transitional         CONTINUE " these medications which have CHANGED    Details   ALPHAGAN P 0.1 % ophthalmic solution Place 1 drop into both eyes 2 times daily, CHERYL, Transitional      hydrochlorothiazide (HYDRODIURIL) 25 MG tablet Take 1 tablet (25 mg) by mouth 2 times daily, Disp-30 tablet, R-0, Transitional      latanoprost (XALATAN) 0.005 % ophthalmic solution Place 1 drop into both eyes At Bedtime, Transitional      lisinopril (PRINIVIL/ZESTRIL) 20 MG tablet Take 1 tablet (20 mg) by mouth 2 times daily, Disp-30 tablet, R-0, Transitional         CONTINUE these medications which have NOT CHANGED    Details   dorzolamide-timolol (COSOPT) 2-0.5 % ophthalmic solution INSTILL 1 DROP IN BOTH EYES TWICE A DAY, R-4, Historical      levothyroxine (SYNTHROID/LEVOTHROID) 125 MCG tablet Historical               DISCHARGE INSTRUCTIONS AND FOLLOW UP  Discharge Procedure Orders   General info for SNF   Order Comments: Length of Stay Estimate: Short Term Care: Estimated # of Days <30  Condition at Discharge: Improving  Level of care:skilled   Rehabilitation Potential: Good  Admission H&P remains valid and up-to-date: Yes  Recent Chemotherapy: N/A  Use Nursing Home Standing Orders: Yes     Mantoux instructions   Order Comments: Give two-step Mantoux (PPD) Per Facility Policy Yes     Activity - Up with nursing assistance     Order Specific Question Answer Comments   Is discharge order? Yes      Wound care   Order Comments: Site:   Right ankle - surgical incision   Instructions:  Daily dressing change - ok to be left open to air when in bed     Reason for your hospital stay   Order Comments: Weakness and sensory loss in bilateral lower and upper extremities     Follow Up and recommended labs and tests   Order Comments: Follow up with Dr. Collins as scheduled on 12/26.     Follow up with PCP after discharge from TCU.     Full Code     Order Specific Question Answer Comments   Code status determined by: Discussion with patient/legal decision maker      Physical  Therapy Adult Consult   Order Comments: Evaluate and treat as clinically indicated.    Reason: Weakness and sensory loss in bilateral lower and upper extremities     Occupational Therapy Adult Consult   Order Comments: Evaluate and treat as clinically indicated.    Reason:  Weakness and sensory loss in bilateral lower and upper extremities     Fall precautions     Advance Diet as Tolerated   Order Comments: Follow this diet upon discharge: regular diet     Order Specific Question Answer Comments   Is discharge order? Yes           physical examination    Most recent Vital Signs:   Vitals:    12/24/18 2030 12/25/18 0752 12/25/18 1845 12/26/18 0616   BP: 120/80 (!) 117/93 124/76 (!) 138/93   BP Location: Left arm Left arm Left arm Left arm   Pulse:  63 62    Resp:  18 18 16   Temp:  97  F (36.1  C) 96.6  F (35.9  C) 96.2  F (35.7  C)   TempSrc:  Oral Oral Oral   SpO2:  95% 95% 96%   Weight:       Height:           Discharge summary was prepared on 12/24/18 for anticipated discharge on Wed. So no exam on the day of discharge.     Samantha Green MD  Physical Medicine & Rehabilitation

## 2018-12-25 NOTE — PLAN OF CARE
Pt is alert and oriented x 4, he denies pain or discomfort. He has right lower extremity weakness, he needed assist of one person for transfer and ambulation to the toilet. He needed moderate assist of one to put on his shoes and braces on the RLE. He is going to discharge to TCU on 12/26/18. He will be attending an  out pt appointment on his way to TCU, wife is going to drive him. His discharge paper work is on the brain board, the night nurse needs to discharge him at 0700.

## 2018-12-25 NOTE — DISCHARGE INSTRUCTIONS
Follow up with Dr. Collins as scheduled on 12/26.     Follow up with PCP after discharge from TCU.

## 2018-12-25 NOTE — PROGRESS NOTES
FOCUS/GOAL  Discharge planning and Mobility    ASSESSMENT, INTERVENTIONS AND CONTINUING PLAN FOR GOAL:    Pt denies pain, chest pain or SOB. No change in neuro. VSS. LBM today per pt.   Up in chair most of pm, uses urinal independently and staff emptied.  Needing min CGA, gait belt and FWW to transfer to bed. Uses RAFO for ambulation.  Plan for discharge on Wed. 12/26 at 7am for a  clinic appointment and then to TCU in [place] with wife.

## 2018-12-26 NOTE — PLAN OF CARE
FOCUS/GOAL  Bladder management, Mobility and Safety management    ASSESSMENT, INTERVENTIONS AND CONTINUING PLAN FOR GOAL:  Pt slept well through the night.  No complaints of pain, SOB, or nausea.  Using urinal independently. Staff just empties.  AX1 with walker and gait belt.  Transferring to TCU this morning.  Wife is coming at 0700 to pick him up.  Pt able to make needs known.  Call light within reach.  Continue plan of care.

## 2018-12-26 NOTE — PROGRESS NOTES
Pt was transferred to TCU this AM at 0700.  Wife came to pick him up.  All materials needed in envelope and medication info were sent with patient and wife.

## 2018-12-26 NOTE — PLAN OF CARE
FOCUS/GOAL  Wound care management and Discharge planning    ASSESSMENT, INTERVENTIONS AND CONTINUING PLAN FOR GOAL:  Pt denied pain, no change in neuro. VSS.   Right ankle incision had scant amount of brownish drainage, new dressing applied.   Continent of bowel and bladder. LBM today per pt.   Using urinal independently.   Plan for discharge to TCU tomorrow. Pt wants to take am medications before discharge at 7am.

## 2018-12-26 NOTE — PROGRESS NOTES
Chief Complaint: Weakness of the limbs    History of Present Illness:    Ry Churchill is a 63 year old left handed man who presents for evaluation of progressive weakness in his legs and . In August 2017 he went on an [employer] [place] and developed a fever and sweating. He eventually was diagnosed with pneumonia and received antibiotics for 5 days.  In 10/2017 weakness began. At that time he developed weakness in his right foot. There was no pain or paresthesias. In 1/2018 he started using a cane. He was evaluated by Dr. Zaldivar from [facility] and based on his lumbar MRI, he underwent L1-2 laminectomy and decompression with L4 laminotomy right L5 laminectomy and medial facetectomy on 3/9/2018. He had no back pain before the surgery. There was no improvement in strength after surgery. April to May he had physical therapy. He continued to worsen and in August 2018 was admitted for 2 falls. He was worked up for cardiac and pulmonary causes of his overall weakness and fatigue. Sometime around that time, perhaps before, the left leg became weal. He also developed hand weakness and started dropping objects from his hands.  By October weakness was proximal in his lower limbs, but still affecting the right leg much more than the left. He developed trouble getting out of chairs and walking up stairs. Still no pain, numbness or paresthesias. No back or neck pain. No tremor. He also reported no dysarthria, dysphagia, diplopia, or shortness of breath. He was hospitalized from 12/6 to 12/26 and had an extensive workup including EMG, lumbar puncture, serum studies and sural biopsy. He received IVIG 2 gm/kg (12/6 - 12/10). After IVIg he felt his energy improved. His overall level of stamina improved. Focal strength did not convincingly improve. He feels the positive effect has worn off.     Prior pertinent laboratory work-up:  11-12 /2018: TSH 7.42 (high). . Hba1c 6.7. MAGDA 1:80. Serum IF showed small IgG  monoclonal protein. Normal/negative T4, ESR,CRP, lyme, lead, GM1, SSA, SSB, MMA, B12   12/7/18:  CSF RBC 4038. WBC 14. Protein 136. Glucose 74. No OGB, VDRL, lyme negative  11/29/18: CSF . WBC 8. Protein 186. Glucose 65. CSF cytology, lyme negative    Prior biopsy:   12/18: Sural nerve biopsy was essentially normal    Prior pertinent radiology work-up:  11/18: MRI brain w/w/o: essentially normal brain   11/18: MRI LS.T/C spine w/w/o contrast. No areas of nerve root enlargement or enhancement. Multilevel spondylosis changes as detailed in radiology report.     Prior electrophysiologic work-up:  Nerve conduction studies/EMG   11/18: NSC/EMG at [facility] showed of a generalized disorder of lower motor neurons affecting the cervical, thoracic, and lumbosacral segments. In addition, there is evidence of a superimposed mild to moderate right-sided median neuropathy at the wrist and a superimposed mild to moderate right-sided ulnar neuropathy at the elbow. Note is additionally made of very modest distal slowing of several sensory responses. These findings are unlikely to have clinical significance. There is no evidence of a generalized demyelinating polyneuropathy on the basis of this study, as clinically queried.     3/2018 EMG shows chronic radiculopathy at L4-L5 and L5-S1 per reports    Past Medical History:   HTN, glaucoma in both eyes, vitiligo  13 years ago thyroid ablation due to hyperthyroidism    Past Surgical History:  Right partial knee replacement    Family history:    There is no known family history of hereditary neuropathies or other neuromuscular disorders.    Social History:    She denies tobacco, alcohol, or illicit drug use. There is no known exposure to toxins or heavy metals.     Medical Allergies:  NKDA     Current Medications:      Review of Systems: A complete review of systems was obtained and was negative except for what was noted above.    Physical examination:    /73   Pulse 54   Temp  "97.8  F (36.6  C) (Oral)   Resp 18   Ht 1.778 m (5' 10\")   Wt 139.3 kg (307 lb)   SpO2 93%   BMI 44.05 kg/m      There are no carotid bruits.       General Appearance: NAD    Skin: There are no rashes or other skin lesions.    Musculoskeletal:  There is no scoliosis, lordosis, kyphosis, pes cavus, or hammertoes.    Neurologic examination:    Mental status:  Patient is alert, attentive, and oriented x 3.  Language is coherent and fluent without dysarthria or aphasia.  Memory, comprehension and ability to follow commands were intact.       Cranial nerves:  Pupils were round and reacted to light.  Extraocular movements were full. There was no face, jaw, palate or tongue weakness or atrophy. Hearing was grossly intact.  Shoulder shrug was normal.      Motor exam: No atrophy or fasciculations.  No action or percussion myotonia or paramyotonia.  Manual muscle testing revealed the following MRC grade muscle power:   Right Left   Neck flexion 5    Neck extension: 5    Shoulder abduction:  5 5   Elbow extension: 5 5   Elbow flexion:  5 5   Wrist flexion:  5 5   Wrist extension:  5 5   Finger flexion 5 5   Finger extension 4+ 5   FDI 4 4   APB 5 5   Hip flexion 1 3   Hip extension 0 3   Hip abduction 1 5   Hip adduction 1 2   Knee flexion 2 4   Knee extension 2 5   Dorsiflexion 0 2   Plantar flexion 0 5   Eversion 0 3   Inversion 0 3   EHL 0 3     Complex motor skills: No tremor or ataxia     Sensory exam revealed decreased vibratory perception in the toes bilaterally, 3 seconds on the right, 5 seconds on the left. Proprioception was intact. Pinprick and temperature were decreased to the ankles bilaterally. Unable to test Romberg, patient profoundly weak and cannot stand with feet together    Requires assistance to walk. Leans on left leg. Unable to stand on toes or heels.      Deep tendon reflexes:   Right Left   Triceps 0 0   Biceps 0 0   Brachioradialis 1 1   Knee jerk 0 0   Ankle jerk 0 0   Plantar responses were " mute bilaterally.       18 (before IVIg)-  Left hand: average 35 kg,  right hand: average 31 kg     Neuropathy Assessments  Neurology Assessments 2018   RODS CIDP/MGUSP Score 16      Strength: 2018   Right hand strength in K   Left hand strength in K     Immunotherapy 2018   Time since last IVIG (days): 2 weeks   Current treatment: IVIG 100 gm q 2 weeks     Assessment:    Mr. Churchill is a 63 year old man with a 15 month history of gradually progressive, asymmetric focal weakness that began in right leg in 10/2017 followed by left leg in 2018. He reports  strength weakness that he first noticed in the summer of  as well. Serum and CSF workup revealed slightly elevated IgA level on WALTER and elevated CSF protein. EMG/NCS revealed generalized disorder of lower motor neurons affecting the cervical, thoracic, and lumbosacral segments with superimposed mild to moderate right-sided median and ulnar neuropathies. Physical exam is significant for a segmental pattern of weakness in the distal right >left upper extremities and bilateral proximal and distal weakness in the right > left lower extremities with reduced reflexes throughout and no UMN signs. Given the above exam findings, clinical history and electrophysiologic pattern the most likely diagnosis is lower motor neuron disease. However, since there appears to have been an objective improvement in  strength after a 5 day course of IVIG during hospitalization (which has since diminished) as well as an elevated CSF protein of uncertain significance given traumatic spinal taps and a history of lumbar surgery, an inflammatory neuropathy remains on the differential. We discussed a 3 month trial of IVIG every 2 weeks     Plan:      - VEGF and immunofixation  - PFTs today  - Neurofascin and contactin 1 ab pending  - Trial of IVIG every 2 weeks for 3 months  - Follow up in 1 month then in 3 months    Seen and discussed with   Dennis. His addendum follows.     Thi López DO  [facility] Neuromuscular Fellow [id number]  ----  ADDENDUM:  I personally interviewed and examined the patient. I agree with the history, physical, assessment, and plan as documented below.     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 The pattern of weakness is probably segmental. Like the NCS/EMG, sural nerve biopsy showed no sensory involvement - and certainly no evidence of an inflammatory process. The differential at this point essentially comes down to the motor variant of CIDP vs MMN vs ALS. The pattern of weakness most strongly favors ALS as does and the absence of any demyelinating changes or conduction block on the NCS. 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 I told him this today in clinic. Still, a few features require us to take pause before concluding this diagnosis withy certainty. First, there are no upper motor findings what so ever. Second, his CSF protein was elevated. While some of this elevation can be attributed to a traumatic tap, levels of 130 and 180 are hard to dismiss. Perhaps his recent low back surgery and spondylitic low back changes contributed as well. With these uncertainties in mind I think a course of IVIg is reason jerod. We very clearly outlined what would constitute objective improvement in immunotherapy, and what would not. He knows that we will only continue IVIg beyond 3 months of objective strength improvement found. I am going to dose him at 0.75 gm/kg q 2 weeks (slightly higher than ICE based dosing) as to try and avoid ambiguous results from under dosing. I also agree with checking spirometry as above, although I have no specific clinical concerns about that at this time. I will see him back at 1 month to assess for interval changes, and then at 3 months for a  more definitive assessment of treatment response. I am happy to see him before then if needed.         Benjamin Collins MD

## 2018-12-26 NOTE — NURSING NOTE
Chief Complaint   Patient presents with     NEUROPATHY     [facility]     J Luis Hernández, EMT

## 2018-12-26 NOTE — LETTER
12/26/2018       RE: Ry Churchill  [address]  [place] [address]     Dear Colleague,    Thank you for referring your patient, Ry Churchill, to the [facility] NEUROLOGY at [facility]. Please see a copy of my visit note below.    Chief Complaint: Weakness of the limbs    History of Present Illness:    Ry Churchill is a 63 year old left handed man who presents for evaluation of progressive weakness in his legs and . In August 2017 he went on an [employer] cruise and developed a fever and sweating. He eventually was diagnosed with pneumonia and received antibiotics for 5 days.  In 10/2017 weakness began. At that time he developed weakness in his right foot. There was no pain or paresthesias. In 1/2018 he started using a cane. He was evaluated by Dr. Zaldivar from [facility] and based on his lumbar MRI, he underwent L1-2 laminectomy and decompression with L4 laminotomy right L5 laminectomy and medial facetectomy on 3/9/2018. He had no back pain before the surgery. There was no improvement in strength after surgery. April to May he had physical therapy. He continued to worsen and in August 2018 was admitted for 2 falls. He was worked up for cardiac and pulmonary causes of his overall weakness and fatigue. Sometime around that time, perhaps before, the left leg became weal. He also developed hand weakness and started dropping objects from his hands.  By October weakness was proximal in his lower limbs, but still affecting the right leg much more than the left. He developed trouble getting out of chairs and walking up stairs. Still no pain, numbness or paresthesias. No back or neck pain. No tremor. He also reported no dysarthria, dysphagia, diplopia, or shortness of breath. He was hospitalized from 12/6 to 12/26 and had an extensive workup including EMG, lumbar puncture, serum studies and sural biopsy. He received IVIG 2 gm/kg (12/6 - 12/10). After IVIg  he felt his energy improved. His overall level of stamina improved. Focal strength did not convincingly improve. He feels the positive effect has worn off.     Prior pertinent laboratory work-up:  11-12 /2018: TSH 7.42 (high). . Hba1c 6.7. MAGDA 1:80. Serum IF showed small IgG monoclonal protein. Normal/negative T4, ESR,CRP, lyme, lead, GM1, SSA, SSB, MMA, B12   12/7/18:  CSF RBC 4038. WBC 14. Protein 136. Glucose 74. No OGB, VDRL, lyme negative  11/29/18: CSF . WBC 8. Protein 186. Glucose 65. CSF cytology, lyme negative    Prior biopsy:   12/18: Sural nerve biopsy was essentially normal    Prior pertinent radiology work-up:  11/18: MRI brain w/w/o: essentially normal brain   11/18: MRI LS.T/C spine w/w/o contrast. No areas of nerve root enlargement or enhancement. Multilevel spondylosis changes as detailed in radiology report.     Prior electrophysiologic work-up:  Nerve conduction studies/EMG   11/18: NSC/EMG at [facility] showed of a generalized disorder of lower motor neurons affecting the cervical, thoracic, and lumbosacral segments. In addition, there is evidence of a superimposed mild to moderate right-sided median neuropathy at the wrist and a superimposed mild to moderate right-sided ulnar neuropathy at the elbow. Note is additionally made of very modest distal slowing of several sensory responses. These findings are unlikely to have clinical significance. There is no evidence of a generalized demyelinating polyneuropathy on the basis of this study, as clinically queried.     3/2018 EMG shows chronic radiculopathy at L4-L5 and L5-S1 per reports    Past Medical History:   HTN, glaucoma in both eyes, vitiligo  13 years ago thyroid ablation due to hyperthyroidism    Past Surgical History:  Right partial knee replacement    Family history:    There is no known family history of hereditary neuropathies or other neuromuscular disorders.    Social History:    She denies tobacco, alcohol, or illicit drug use.  "There is no known exposure to toxins or heavy metals.     Medical Allergies:  NKDA     Current Medications:      Review of Systems: A complete review of systems was obtained and was negative except for what was noted above.    Physical examination:    /73   Pulse 54   Temp 97.8  F (36.6  C) (Oral)   Resp 18   Ht 1.778 m (5' 10\")   Wt 139.3 kg (307 lb)   SpO2 93%   BMI 44.05 kg/m       There are no carotid bruits.       General Appearance: NAD    Skin: There are no rashes or other skin lesions.    Musculoskeletal:  There is no scoliosis, lordosis, kyphosis, pes cavus, or hammertoes.    Neurologic examination:    Mental status:  Patient is alert, attentive, and oriented x 3.  Language is coherent and fluent without dysarthria or aphasia.  Memory, comprehension and ability to follow commands were intact.       Cranial nerves:  Pupils were round and reacted to light.  Extraocular movements were full. There was no face, jaw, palate or tongue weakness or atrophy. Hearing was grossly intact.  Shoulder shrug was normal.      Motor exam: No atrophy or fasciculations.  No action or percussion myotonia or paramyotonia.  Manual muscle testing revealed the following MRC grade muscle power:   Right Left   Neck flexion 5    Neck extension: 5    Shoulder abduction:  5 5   Elbow extension: 5 5   Elbow flexion:  5 5   Wrist flexion:  5 5   Wrist extension:  5 5   Finger flexion 5 5   Finger extension 4+ 5   FDI 4 4   APB 5 5   Hip flexion 1 3   Hip extension 0 3   Hip abduction 1 5   Hip adduction 1 2   Knee flexion 2 4   Knee extension 2 5   Dorsiflexion 0 2   Plantar flexion 0 5   Eversion 0 3   Inversion 0 3   EHL 0 3     Complex motor skills: No tremor or ataxia     Sensory exam revealed decreased vibratory perception in the toes bilaterally, 3 seconds on the right, 5 seconds on the left. Proprioception was intact. Pinprick and temperature were decreased to the ankles bilaterally. Unable to test Romberg, patient " profoundly weak and cannot stand with feet together    Requires assistance to walk. Leans on left leg. Unable to stand on toes or heels.      Deep tendon reflexes:   Right Left   Triceps 0 0   Biceps 0 0   Brachioradialis 1 1   Knee jerk 0 0   Ankle jerk 0 0   Plantar responses were mute bilaterally.       18 (before IVIg)-  Left hand: average 35 kg,  right hand: average 31 kg     Neuropathy Assessments  Neurology Assessments 2018   RODS CIDP/MGUSP Score 16      Strength: 2018   Right hand strength in K   Left hand strength in K     Immunotherapy 2018   Time since last IVIG (days): 2 weeks   Current treatment: IVIG 100 gm q 2 weeks     Assessment:    Mr. Churchill is a 63 year old man with a 15 month history of gradually progressive, asymmetric focal weakness that began in right leg in 10/2017 followed by left leg in 2018. He reports  strength weakness that he first noticed in the summer of  as well. Serum and CSF workup revealed slightly elevated IgA level on WALTER and elevated CSF protein. EMG/NCS revealed generalized disorder of lower motor neurons affecting the cervical, thoracic, and lumbosacral segments with superimposed mild to moderate right-sided median and ulnar neuropathies. Physical exam is significant for a segmental pattern of weakness in the distal right >left upper extremities and bilateral proximal and distal weakness in the right > left lower extremities with reduced reflexes throughout and no UMN signs. Given the above exam findings, clinical history and electrophysiologic pattern the most likely diagnosis is lower motor neuron disease. However, since there appears to have been an objective improvement in  strength after a 5 day course of IVIG during hospitalization (which has since diminished) as well as an elevated CSF protein of uncertain significance given traumatic spinal taps and a history of lumbar surgery, an inflammatory neuropathy remains  on the differential. We discussed a 3 month trial of IVIG every 2 weeks     Plan:      - VEGF and immunofixation  - PFTs today  - Neurofascin and contactin 1 ab pending  - Trial of IVIG every 2 weeks for 3 months  - Follow up in 1 month then in 3 months    Seen and discussed with Dr. Collins. His addendum follows.     Thi López DO  [facility] Neuromuscular Fellow [id number]  ----  ADDENDUM:  I personally interviewed and examined the patient. I agree with the history, physical, assessment, and plan as documented below.     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 The pattern of weakness is probably segmental. Like the NCS/EMG, sural nerve biopsy showed no sensory involvement - and certainly no evidence of an inflammatory process. The differential at this point essentially comes down to the motor variant of CIDP vs MMN vs ALS. The pattern of weakness most strongly favors ALS as does and the absence of any demyelinating changes or conduction block on the NCS. 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 I told him this today in clinic. Still, a few features require us to take pause before concluding this diagnosis withy certainty. First, there are no upper motor findings what so ever. Second, his CSF protein was elevated. While some of this elevation can be attributed to a traumatic tap, levels of 130 and 180 are hard to dismiss. Perhaps his recent low back surgery and spondylitic low back changes contributed as well. With these uncertainties in mind I think a course of IVIg is reason jerod. We very clearly outlined what would constitute objective improvement in immunotherapy, and what would not. He knows that we will only continue IVIg beyond 3 months of objective strength improvement found. I am going to dose him at 0.75 gm/kg q 2 weeks (slightly higher than  ICE based dosing) as to try and avoid ambiguous results from under dosing. I also agree with checking spirometry as above, although I have no specific clinical concerns about that at this time. I will see him back at 1 month to assess for interval changes, and then at 3 months for a more definitive assessment of treatment response. I am happy to see him before then if needed.     Benjamin Collins MD

## 2018-12-26 NOTE — TELEPHONE ENCOUNTER
Patient was seen by Dr. oCllins today and the decision was made to start him on IVIg 100 g every 2 weeks. Therapy plan entered and routed to Dr. Collins for review and signature.   Patient would like to infuse at [facility] (phone: [phone]; fax: [phone]).

## 2018-12-28 NOTE — LETTER
2018       RE: Ry Churchill  [address]  [place] [address]     Dear Colleague,    Thank you for referring your patient, Ry Churchill, to the [facility] EMG at [facility]. Please see a copy of my visit note below.      [facility] PHYSICIANS   NEUROMUSCULAR PATHOLOGY REPORT     NEUROMUSCULAR LABORATORY [phone] / [phone]  [address],  [place] [address]     NERVE BIOPSY REPORT  NAME: Ry Churchill  MR#: [id number]  : 3DATE OF BIOPSY: 2018  DATE OF REPORT: 2018  SPECIMEN NO:   SURGEON: John  REFERRING PHYSICIAN: Dennis    CLINICAL INFORMATION:  This 63 year old man had a right sural nerve biopsy performed to evaluate for CIDP (Chronic Inflammatory Demyelinating Polyneuropathy). He has a history of subacute 4-limb weakness and areflexia. EMG showed evidence of widespread denervation without clear conduction block or other demyelinating features. However, CSF protein was markedly elevated at 165 mg/dl without pleocytosis. Sural sensory nerve conduction studies performed before the biopsy were normal.    RIGHT SURAL NERVE NERVE BIOPSY:  One segment of right sural nerve was submitted for paraffin embedding for light microscopy and special staining. A second segment of nerve was fixed in 2.5% glutaraldehyde/paraformaldehyde for plastic embedding.    LIGHT MICROSCOPY:  Sections cut from paraffin-embedded material were stained with H&E , Gurvinder Trichrome, LFB/PAS, and Congo red. One-micron sections were cut from plastic embedded nerve and stained with toluidine blue. The myelinated fiber density was for the most normal in all the fascicles. There were occasional thinly myelinated fibers occurring individually and a couple of regenerating clusters in each fascicle, which in a 63 year old patient are considered normal findings. There were no naked axons, tomaculae, or onion bulbs. There were no  degenerating fibers. Perineurium and the subperineurial and interstitial spaces were normal. Congo red stain was negative for amyloid deposits. H&E preparation did not show any inflammation.    IMMUNOHISTOCHEMISTRY:  Specific markers for T-lymphocytes, B-lymphocytes, and macrophages were applied. They were all negative.    DIAGNOSIS:    Normal sural nerve biopsy for a 63 year old patient. No histopathologic evidence of CIDP or axonal polyneuropathy.    Teodoro Robison MD   of Neurology

## 2018-12-28 NOTE — PROGRESS NOTES
[facility] PHYSICIANS   NEUROMUSCULAR PATHOLOGY REPORT     NEUROMUSCULAR LABORATORY [phone] / [phone]  [address] [place] [address],  [place] [address]     NERVE BIOPSY REPORT  NAME: Ry Churchill  MR#: [id number]  : 3DATE OF BIOPSY: 2018  DATE OF REPORT: 2018  SPECIMEN NO:   SURGEON: John  REFERRING PHYSICIAN: Dennis    CLINICAL INFORMATION:  This 63 year old man had a right sural nerve biopsy performed to evaluate for CIDP (Chronic Inflammatory Demyelinating Polyneuropathy). He has a history of subacute 4-limb weakness and areflexia. EMG showed evidence of widespread denervation without clear conduction block or other demyelinating features. However, CSF protein was markedly elevated at 165 mg/dl without pleocytosis. Sural sensory nerve conduction studies performed before the biopsy were normal.    RIGHT SURAL NERVE NERVE BIOPSY:  One segment of right sural nerve was submitted for paraffin embedding for light microscopy and special staining. A second segment of nerve was fixed in 2.5% glutaraldehyde/paraformaldehyde for plastic embedding.    LIGHT MICROSCOPY:  Sections cut from paraffin-embedded material were stained with H&E , Gurvinder Trichrome, LFB/PAS, and Congo red. One-micron sections were cut from plastic embedded nerve and stained with toluidine blue. The myelinated fiber density was for the most normal in all the fascicles. There were occasional thinly myelinated fibers occurring individually and a couple of regenerating clusters in each fascicle, which in a 63 year old patient are considered normal findings. There were no naked axons, tomaculae, or onion bulbs. There were no degenerating fibers. Perineurium and the subperineurial and interstitial spaces were normal. Congo red stain was negative for amyloid deposits. H&E preparation did not show any inflammation.    IMMUNOHISTOCHEMISTRY:  Specific markers for T-lymphocytes, B-lymphocytes, and  macrophages were applied. They were all negative.    DIAGNOSIS:    Normal sural nerve biopsy for a 63 year old patient. No histopathologic evidence of CIDP or axonal polyneuropathy.    Teodoro Robison MD   of Neurology

## 2019-01-01 ENCOUNTER — OFFICE VISIT (OUTPATIENT)
Dept: NEUROLOGY | Facility: CLINIC | Age: 64
End: 2019-01-01
Payer: MEDICARE

## 2019-01-01 ENCOUNTER — OFFICE VISIT (OUTPATIENT)
Dept: NEUROLOGY | Facility: CLINIC | Age: 64
End: 2019-01-01
Attending: PSYCHIATRY & NEUROLOGY
Payer: MEDICARE

## 2019-01-01 ENCOUNTER — TELEPHONE (OUTPATIENT)
Dept: NEUROLOGY | Facility: CLINIC | Age: 64
End: 2019-01-01

## 2019-01-01 ENCOUNTER — TELEPHONE (OUTPATIENT)
Dept: INTERVENTIONAL RADIOLOGY/VASCULAR | Facility: CLINIC | Age: 64
End: 2019-01-01

## 2019-01-01 VITALS
HEART RATE: 65 BPM | BODY MASS INDEX: 43.08 KG/M2 | DIASTOLIC BLOOD PRESSURE: 94 MMHG | SYSTOLIC BLOOD PRESSURE: 146 MMHG | RESPIRATION RATE: 18 BRPM | OXYGEN SATURATION: 98 % | WEIGHT: 300.9 LBS | TEMPERATURE: 97.7 F | HEIGHT: 70 IN

## 2019-01-01 VITALS
TEMPERATURE: 97.6 F | BODY MASS INDEX: 43.95 KG/M2 | HEIGHT: 70 IN | HEART RATE: 71 BPM | OXYGEN SATURATION: 96 % | SYSTOLIC BLOOD PRESSURE: 134 MMHG | WEIGHT: 307 LBS | DIASTOLIC BLOOD PRESSURE: 74 MMHG

## 2019-01-01 DIAGNOSIS — G61.81 CIDP (CHRONIC INFLAMMATORY DEMYELINATING POLYNEUROPATHY) (H): Primary | ICD-10-CM

## 2019-01-01 DIAGNOSIS — G58.8 FOCAL MOTOR NEUROPATHY: ICD-10-CM

## 2019-01-01 DIAGNOSIS — G58.8 FOCAL MOTOR NEUROPATHY: Primary | ICD-10-CM

## 2019-01-01 LAB
EXPTIME-PRE: 8.61 SEC
FEF2575-%PRED-PRE: 64 %
FEF2575-PRE: 1.72 L/SEC
FEF2575-PRED: 2.65 L/SEC
FEFMAX-%PRED-PRE: 62 %
FEFMAX-PRE: 5.5 L/SEC
FEFMAX-PRED: 8.74 L/SEC
FEV1-%PRED-PRE: 70 %
FEV1-PRE: 2.38 L
FEV1FEV6-PRE: 74 %
FEV1FEV6-PRED: 78 %
FEV1FVC-PRE: 73 %
FEV1FVC-PRED: 77 %
FIFMAX-PRE: 4.52 L/SEC
FVC-%PRED-PRE: 73 %
FVC-PRE: 3.25 L
FVC-PRED: 4.41 L
MEP-PRE: 65 CMH2O
MIP-PRE: -50 CMH2O

## 2019-01-01 RX ORDER — AMLODIPINE BESYLATE 5 MG/1
5 TABLET ORAL DAILY
Refills: 5 | COMMUNITY
Start: 2019-01-01

## 2019-01-01 ASSESSMENT — ENCOUNTER SYMPTOMS
SHORTNESS OF BREATH: 1
LOSS OF CONSCIOUSNESS: 0
TROUBLE SWALLOWING: 1
DIZZINESS: 0
MYALGIAS: 0
MUSCLE WEAKNESS: 1
SINUS CONGESTION: 1
TREMORS: 0
SMELL DISTURBANCE: 0
SNORES LOUDLY: 0
TINGLING: 0
SPEECH CHANGE: 0
DISTURBANCES IN COORDINATION: 0
COUGH: 1
JOINT SWELLING: 0
NUMBNESS: 1
SORE THROAT: 1
MEMORY LOSS: 0
HOARSE VOICE: 1
ARTHRALGIAS: 0
COUGH DISTURBING SLEEP: 1
SPUTUM PRODUCTION: 1
DECREASED CONCENTRATION: 0
NERVOUS/ANXIOUS: 1
WEAKNESS: 1
NECK MASS: 0
POSTURAL DYSPNEA: 1
PARALYSIS: 1
DEPRESSION: 1
SEIZURES: 0
HEADACHES: 0
DYSPNEA ON EXERTION: 1
TASTE DISTURBANCE: 0
SINUS PAIN: 0
WHEEZING: 1
NECK PAIN: 0
BACK PAIN: 0
PANIC: 0
MUSCLE CRAMPS: 0
INSOMNIA: 1
STIFFNESS: 0
HEMOPTYSIS: 0

## 2019-01-01 ASSESSMENT — MIFFLIN-ST. JEOR
SCORE: 2178.79
SCORE: 2151.12

## 2019-01-01 ASSESSMENT — PAIN SCALES - GENERAL
PAINLEVEL: NO PAIN (0)
PAINLEVEL: NO PAIN (0)

## 2019-02-13 NOTE — LETTER
2/13/2019       RE: Ry Churchill  [address]  [facility] [address]     Dear Colleague,    Thank you for referring your patient, Ry Churchill, to the [facility] NEUROLOG[facility] at [facility]. Please see a copy of my visit note below.    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 Weakness began October 2017 about 1-2 months following a febrile illness. Weakness onset was in his right foot. There was no pain or paresthesias. In 1/2018 he started using a cane. In 3/18 he underwent L1-2 laminectomy and decompression with L4 laminotomy right L5 laminectomy and medial facetectomy. He had no back pain before the surgery. There was no improvement in strength after surgery. His strength continued to worsen. In August 2018 was admitted for 2 falls. He was worked up for cardiac and pulmonary causes of his overall weakness and fatigue. Sometime around that time, perhaps before, the left leg became weak. He also developed hand weakness.  By October 2018 weakness was now both proximal and distal in both lower limbs, but still the right leg was much more affected than the left. Still no pain, numbness or paresthesias. No back or neck pain. No tremor. No dysarthria, dysphagia, diplopia, or shortness of breath. He was hospitalized from 12/6 to 12/26 and had an extensive workup including EMG, lumbar puncture, serum studies and sural biopsy. Data is detailed below. He received IVIG 2 gm/kg (12/6 - 12/10) and then started 0.75 gm/kg q 2 weeks.     Interim History:   He was last seen in clinic 12/26/2018. He has had 3 IVIG treatments after the initial loading dose, the last of which was 7 days ago. During his last IVIg session he developed elevation of his blood pressure, flushing and chills. He reports that when  compared to before IVIG his strength and overall functionality is improved. He notices that proximal arm strength is especially better. He can now push himself up from a seated position. He notices more strength in his legs. He still has profound difficulty walking, but notices he clear his foot from the ground with greater ease. The benefits last for about 10-12 days after IVIg. In the days before IVIg his strength and functionality worsen.  He was placed on Amlodipine by his PCP for BP, and this has been helpful.    Prior pertinent laboratory work-up:  12/18: Negative NF and CNTN1 abs. Normal VEGF. Serum IF showed no monoclonal protein.   12/7/18:  CSF RBC 4038. WBC 14. Protein 136. Glucose 74. No OGB, VDRL, lyme negative  11/29/18: CSF . WBC 8. Protein 186. Glucose 65. CSF cytology, lyme negative  11-12 /2018: TSH 7.42 (high). . Hba1c 6.7. MAGDA 1:80. Serum IF showed small IgG monoclonal protein. Normal/negative T4, ESR,CRP, lyme, lead, GM1, SSA, SSB, MMA, B12     Prior biopsy:   12/18: Sural nerve biopsy was essentially normal    Prior pertinent radiology work-up:  11/18: MRI brain w/w/o: essentially normal brain   11/18: MRI LS.T/C spine w/w/o contrast. No areas of nerve root enlargement or enhancement. Multilevel spondylosis changes as detailed in radiology report.     Prior electrophysiologic work-up:  11/18: NSC/[facility] at [facility] showed of a generalized disorder of lower motor neurons affecting the cervical, thoracic, and lumbosacral segments. In addition, there is evidence of a superimposed mild to moderate right-sided median neuropathy at the wrist and a superimposed mild to moderate right-sided ulnar neuropathy at the elbow. Note is additionally made of very modest distal slowing of several sensory responses.    3/2018 EMG shows chronic radiculopathy at L4-L5 and L5-S1 per reports    Past Medical History:   HTN, glaucoma in both eyes, vitiligo  13 years ago thyroid ablation due to  "hyperthyroidism    Past Surgical History:  Right partial knee replacement    Family history:    There is no known family history of hereditary neuropathies or other neuromuscular disorders.    Social History:    She denies tobacco, alcohol, or illicit drug use.     Medical Allergies:  NKDA     Current Medications:      Current Outpatient Medications   Medication     amLODIPine (NORVASC) 5 MG tablet     dorzolamide-timolol (COSOPT) 2-0.5 % ophthalmic solution     levothyroxine (SYNTHROID/LEVOTHROID) 125 MCG tablet     hydrochlorothiazide (HYDRODIURIL) 25 MG tablet     latanoprost (XALATAN) 0.005 % ophthalmic solution     lisinopril (PRINIVIL/ZESTRIL) 20 MG tablet     No current facility-administered medications for this visit.      Review of Systems: A complete review of systems was obtained and was negative except for what was noted above.    Physical examination:    /74 (BP Location: Left arm, Patient Position: Sitting, Cuff Size: Adult Large)   Pulse 71   Temp 97.6  F (36.4  C) (Oral)   Ht 1.778 m (5' 10\")   Wt 139.3 kg (307 lb)   SpO2 96%   BMI 44.05 kg/m       FVC 73% (compared to 67% at last visit)     General Appearance: NAD    Skin: There are no rashes or other skin lesions.    Musculoskeletal:  There is no scoliosis, lordosis, kyphosis, pes cavus, or hammertoes.    Neurologic examination:    Mental status:  Patient is alert, attentive, and oriented x 3.  Language is coherent and fluent without dysarthria or aphasia.  Memory, comprehension and ability to follow commands were intact.       Cranial nerves:  Pupils were round and reacted to light.  Extraocular movements were full. There was no face, jaw, palate or tongue weakness or atrophy. Hearing was grossly intact.  Shoulder shrug was normal.      Motor exam: No atrophy or fasciculations.  No action or percussion myotonia or paramyotonia.  Manual muscle testing revealed the following MRC grade muscle power:   Right Left   Neck flexion 5    Neck " extension: 5    Shoulder abduction:  5 5   Elbow extension: 5 5   Elbow flexion:  5 5   Wrist flexion:  5 5   Wrist extension:  5 5   Finger flexion 5 5   Finger extension 5 5   FDI 4+ 4+   APB 5 5   Hip flexion 1 4-   Hip extension 2 3   Hip abduction 1 5   Hip adduction 2 4   Knee flexion 2 4   Knee extension 2 5   Dorsiflexion 0 2   Plantar flexion 0 5   Eversion 0 3   Inversion 0 3   EHL 0 3     Complex motor skills: No tremor or ataxia     Sensory exam revealed decreased vibratory perception in the toes bilaterally, 3 seconds on the right, 5 seconds on the left. Proprioception was intact. Pinprick and temperature were decreased to the ankles bilaterally. Unable to test Romberg, patient profoundly weak and cannot stand with feet together    Requires assistance to walk. Leans on left leg. Unable to stand on toes or heels.      Deep tendon reflexes:   Right Left   Triceps 1 1   Biceps 1 1   Brachioradialis 2 1   Knee jerk 0 0   Ankle jerk 0 0   Plantar responses were mute bilaterally.       18 (before IVIg)-  Left hand: average 35 kg,  right hand: average 31 kg     Neuropathy Assessments  Neurology Assessments 2018   RODS CIDP/MGUSP Score 19 16      Strength: 2018   Right hand strength in K 35   Left hand strength in K 36     Immunotherapy 2018   Time since last IVIG (days): 1 week ago 2 weeks   Current treatment: IVIG 100 gm q 2 weeks IVIG 100 gm q 2 weeks       Assessment:    Ry Churchill is a 64 year old man with an asymmetric motor exclusive disorder with widespread active denervation and chronic reinnervation on EMG. Sural nerve biopsy showed no sensory involvement or evidence of an inflammatory process and there was so sensory changes, demyelinating changes, or conduction block on NCS. Although his pattern of weakness was originally thought to favor motor neuron disease, he has had an appreciable improvement of strength on motor  and  testing after 3 courses of IVIg. Additionally, he has had some mild improvement of his PFTs since his previous visit. 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 MMN). We asked the patient and his wife to keep a journal and document  strength 1-2 days after and before IVIg treatment and to take note of any changes in functional abilities. He will continue the same IVIg regimen of 0.75 gm/kg q 2 weeks and follow up in 6 weeks. If he continues to improve, we will continue treatment thereafter.    Plan:      - Continue IVIG 0.75 gm/kg q 2 weeks  - Follow up in 6 weeks    Seen and discussed with Dr. Collins. His addendum follows.     Thi López DO  [facility] Neuromuscular Fellow [phone]  ----  ADDENDUM:  I personally interviewed and examined the patient. I agree with the history, physical, assessment, and plan as documented below.      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 Sural nerve biopsy showed no sensory involvement and no evidence of an inflammatory process - although as there is no definite clinical or electrical sensory involvement this is not necessarily surprising. 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 Even so, the absence of any UMN findings and the CSF which showed marked CSF cytoalbuminologic dissociation we initiated an IVIG trial in 12/18. While severe residuals remains, improvement has been quite convincing. MRC sum score is improved as is  strength and probably RODS. Even his FVC has made a marginal improvement. With this data in mind will continue with IVIg as detailed above. He may have access to a  strength device at home. We asked him to record peak and trough  strength  as this might help us optimize his immunotherapy and also provide further support for an immune mediated process. I will see him back in 6 weeks. Sooner if needed.     Again, thank you for allowing me to participate in the care of your patient.      Sincerely,    Benjamin Collins MD

## 2019-02-13 NOTE — NURSING NOTE
Chief Complaint   Patient presents with     RECHECK     [facility] RETURN 2 MONTH F/U       Narda Edouard LPN

## 2019-02-13 NOTE — PROGRESS NOTES
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 Weakness began October 2017 about 1-2 months following a febrile illness. Weakness onset was in his right foot. There was no pain or paresthesias. In 1/2018 he started using a cane. In 3/18 he underwent L1-2 laminectomy and decompression with L4 laminotomy right L5 laminectomy and medial facetectomy. He had no back pain before the surgery. There was no improvement in strength after surgery. His strength continued to worsen. In August 2018 was admitted for 2 falls. He was worked up for cardiac and pulmonary causes of his overall weakness and fatigue. Sometime around that time, perhaps before, the left leg became weak. He also developed hand weakness.  By October 2018 weakness was now both proximal and distal in both lower limbs, but still the right leg was much more affected than the left. Still no pain, numbness or paresthesias. No back or neck pain. No tremor. No dysarthria, dysphagia, diplopia, or shortness of breath. He was hospitalized from 12/6 to 12/26 and had an extensive workup including EMG, lumbar puncture, serum studies and sural biopsy. Data is detailed below. He received IVIG 2 gm/kg (12/6 - 12/10) and then started 0.75 gm/kg q 2 weeks.     Interim History:   He was last seen in clinic 12/26/2018. He has had 3 IVIG treatments after the initial loading dose, the last of which was 7 days ago. During his last IVIg session he developed elevation of his blood pressure, flushing and chills. He reports that when compared to before IVIG his strength and overall functionality is improved. He notices that proximal arm strength is especially better. He can now push himself up from a seated position. He notices more strength in his legs. He still has profound difficulty walking, but notices he clear his  foot from the ground with greater ease. The benefits last for about 10-12 days after IVIg. In the days before IVIg his strength and functionality worsen.  He was placed on Amlodipine by his PCP for BP, and this has been helpful.    Prior pertinent laboratory work-up:  12/18: Negative NF and CNTN1 abs. Normal VEGF. Serum IF showed no monoclonal protein.   12/7/18:  CSF RBC 4038. WBC 14. Protein 136. Glucose 74. No OGB, VDRL, lyme negative  11/29/18: CSF . WBC 8. Protein 186. Glucose 65. CSF cytology, lyme negative  11-12 /2018: TSH 7.42 (high). . Hba1c 6.7. MAGDA 1:80. Serum IF showed small IgG monoclonal protein. Normal/negative T4, ESR,CRP, lyme, lead, GM1, SSA, SSB, MMA, B12     Prior biopsy:   12/18: Sural nerve biopsy was essentially normal    Prior pertinent radiology work-up:  11/18: MRI brain w/w/o: essentially normal brain   11/18: MRI LS T/C spine w/w/o contrast. No areas of nerve root enlargement or enhancement. Multilevel spondylosis changes as detailed in radiology report.     Prior electrophysiologic work-up:  11/18: NSC/EMG at [facility] showed of a generalized disorder of lower motor neurons affecting the cervical, thoracic, and lumbosacral segments. In addition, there is evidence of a superimposed mild to moderate right-sided median neuropathy at the wrist and a superimposed mild to moderate right-sided ulnar neuropathy at the elbow. Note is additionally made of very modest distal slowing of several sensory responses.    3/2018 EMG shows chronic radiculopathy at L4-L5 and L5-S1 per reports    Past Medical History:   HTN, glaucoma in both eyes, vitiligo  13 years ago thyroid ablation due to hyperthyroidism    Past Surgical History:  Right partial knee replacement    Family history:    There is no known family history of hereditary neuropathies or other neuromuscular disorders.    Social History:    She denies tobacco, alcohol, or illicit drug use.     Medical Allergies:  NKDA     Current  "Medications:      Current Outpatient Medications   Medication     amLODIPine (NORVASC) 5 MG tablet     dorzolamide-timolol (COSOPT) 2-0.5 % ophthalmic solution     levothyroxine (SYNTHROID/LEVOTHROID) 125 MCG tablet     hydrochlorothiazide (HYDRODIURIL) 25 MG tablet     latanoprost (XALATAN) 0.005 % ophthalmic solution     lisinopril (PRINIVIL/ZESTRIL) 20 MG tablet     No current facility-administered medications for this visit.      Review of Systems: A complete review of systems was obtained and was negative except for what was noted above.    Physical examination:    /74 (BP Location: Left arm, Patient Position: Sitting, Cuff Size: Adult Large)   Pulse 71   Temp 97.6  F (36.4  C) (Oral)   Ht 1.778 m (5' 10\")   Wt 139.3 kg (307 lb)   SpO2 96%   BMI 44.05 kg/m      FVC 73% (compared to 67% at last visit)     General Appearance: NAD    Skin: There are no rashes or other skin lesions.    Musculoskeletal:  There is no scoliosis, lordosis, kyphosis, pes cavus, or hammertoes.    Neurologic examination:    Mental status:  Patient is alert, attentive, and oriented x 3.  Language is coherent and fluent without dysarthria or aphasia.  Memory, comprehension and ability to follow commands were intact.       Cranial nerves:  Pupils were round and reacted to light.  Extraocular movements were full. There was no face, jaw, palate or tongue weakness or atrophy. Hearing was grossly intact.  Shoulder shrug was normal.      Motor exam: No atrophy or fasciculations.  No action or percussion myotonia or paramyotonia.  Manual muscle testing revealed the following MRC grade muscle power:   Right Left   Neck flexion 5    Neck extension: 5    Shoulder abduction:  5 5   Elbow extension: 5 5   Elbow flexion:  5 5   Wrist flexion:  5 5   Wrist extension:  5 5   Finger flexion 5 5   Finger extension 5 5   FDI 4+ 4+   APB 5 5   Hip flexion 1 4-   Hip extension 2 3   Hip abduction 1 5   Hip adduction 2 4   Knee flexion 2 4   Knee " extension 2 5   Dorsiflexion 0 2   Plantar flexion 0 5   Eversion 0 3   Inversion 0 3   EHL 0 3     Complex motor skills: No tremor or ataxia     Sensory exam revealed decreased vibratory perception in the toes bilaterally, 3 seconds on the right, 5 seconds on the left. Proprioception was intact. Pinprick and temperature were decreased to the ankles bilaterally. Unable to test Romberg, patient profoundly weak and cannot stand with feet together    Requires assistance to walk. Leans on left leg. Unable to stand on toes or heels.      Deep tendon reflexes:   Right Left   Triceps 1 1   Biceps 1 1   Brachioradialis 2 1   Knee jerk 0 0   Ankle jerk 0 0   Plantar responses were mute bilaterally.       18 (before IVIg)-  Left hand: average 35 kg,  right hand: average 31 kg     Neuropathy Assessments  Neurology Assessments 2018   RODS CIDP/MGUSP Score 19 16      Strength: 2018   Right hand strength in K 35   Left hand strength in K 36     Immunotherapy 2018   Time since last IVIG (days): 1 week ago 2 weeks   Current treatment: IVIG 100 gm q 2 weeks IVIG 100 gm q 2 weeks       Assessment:    Ry Churchill is a 64 year old man with an asymmetric motor exclusive disorder with widespread active denervation and chronic reinnervation on EMG. Sural nerve biopsy showed no sensory involvement or evidence of an inflammatory process and there was so sensory changes, demyelinating changes, or conduction block on NCS. Although his pattern of weakness was originally thought to favor motor neuron disease, he has had an appreciable improvement of strength on motor and  testing after 3 courses of IVIg. Additionally, he has had some mild improvement of his PFTs since his previous visit. 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 MMN). We asked the patient  and his wife to keep a journal and document  strength 1-2 days after and before IVIg treatment and to take note of any changes in functional abilities. He will continue the same IVIg regimen of 0.75 gm/kg q 2 weeks and follow up in 6 weeks. If he continues to improve, we will continue treatment thereafter.    Plan:      - Continue IVIG 0.75 gm/kg q 2 weeks  - Follow up in 6 weeks    Seen and discussed with Dr. Collins. His addendum follows.     Thi López DO  [facility] Neuromuscular Fellow [phone]  ----  ADDENDUM:  I personally interviewed and examined the patient. I agree with the history, physical, assessment, and plan as documented below.      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 Sural nerve biopsy showed no sensory involvement and no evidence of an inflammatory process - although as there is no definite clinical or electrical sensory involvement this is not necessarily surprising. 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 Even so, the absence of any UMN findings and the CSF which showed marked CSF cytoalbuminologic dissociation we initiated an IVIG trial in 12/18. While severe residuals remains, improvement has been quite convincing. MRC sum score is improved as is  strength and probably RODS. Even his FVC has made a marginal improvement. With this data in mind will continue with IVIg as detailed above. He may have access to a  strength device at home. We asked him to record peak and trough  strength as this might help us optimize his immunotherapy and also provide further support for an immune mediated process. I will see him back in 6 weeks. Sooner if needed.       Benjamin Collins MD    ADDENDUM:   2/21/19: Note received from patient. His therapist noted that he needs more help getting out of bed and  was able to do it on his own last week.  Pt now needs moderate assistance to get out of a chair and was able to do this by himself last week.  Pt is having some numbness in his Rt hand but no significant changes in  strength.  Also, pt is now only able to walk about 5 feet decreased about 50 feet from last week. For now will conitnue the plan of IVIg q 2 weeks to see if there is a pattern to this. If so might argue for escalation of therapy. If conitnued decline despite IVIg might again suggest an alternative dx.     ADDENDUM:   3/4/19: I spoke with him by phone. He is concerned that his right leg is getting weaker. He now needs more help for transfers and lifts. Ambulating any distance is more problematic now, which he attributes to right leg weakness. This observation is in contradistinction to his other limbs, which he thinks are improving. He reports more strength in his upper limbs and probably left leg. He notices that improvements continue for 10-12 days after each IVIG infusion and then worsen. At the end of the cycle he needs more help with transfers. Today he is now 12 days out from IVIG. He has a Scar at home. Right  strength 31 kg and left 36 kg. He records this almost every day. Typically he is in the upper 30's to low 40's (as he was at his last clinic visit). He remains challenging for all the reasons discussed here and above - with a differential that remains MMN (or MAMA) vs MND. The response to IVIG favors an immune mediated neuropathy, but the robustness of that response is questionable and clearly he still has some accumulating deficits. At this time will increase his IVIG to 1 gm/kg (divided over 2 days) q 2 weeks. He inquires about a second opinion. I am supportive of that and will help facilitate a visit to the [facility]. I would value their opinion. Deo has an appointment with me in a few weeks. I encouraged him to keep that appointment as well.  I will continue to follow him  closely.

## 2019-02-21 NOTE — TELEPHONE ENCOUNTER
[facility] Call Center    Phone Message    May a detailed message be left on voicemail: yes    Reason for Call: Other: Regina from [facility], Regina saw pt yesterday and has had a significant decline.  Pt now needs helps getting out of bed and was able to do it on his own last week.  Pt needs moderate assistance to get out of a chair and was able to do this by himself last week.  Pt is having some numbness in his Rt hand but no significant changes in  strength.  Also, pt is now only able to walk about 5 feet decreased about 50 feet from last week.  This is basically an fyi for Dr. Collins     Action Taken: Message routed to:  [facility] ([facility] Neuro

## 2019-02-21 NOTE — TELEPHONE ENCOUNTER
Dr. Collins, please see below. Patient is scheduled to follow up with you on 3/27. Do you want to move this up? I know he was just seen on 2/13 but given the decline. Let me know.

## 2019-02-22 NOTE — TELEPHONE ENCOUNTER
Per Dr. Collins: Anyway, lets not move his appointment up for now. I would like to see if he has some ups and downs relative to the IVIg. If he keeps getting weaker over the next week (or more) please let me know. Certainly if he experiences shortness of breath or trouble swallowing he should go to the ER.     Called and spoke with Deo and reported Dr. Collins's input above. Advised him to update us if he continues to decline and go to the ED for SOB or trouble swallowing. He is agreeable.

## 2019-03-27 NOTE — PROGRESS NOTES
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 Weakness began October 2017 about 1-2 months following a febrile illness. Weakness onset was in his right foot. There was no pain or paresthesias. In 1/2018 he started using a cane. In 3/18 he underwent L1-2 laminectomy and decompression with L4 laminotomy right L5 laminectomy and medial facetectomy. He had no back pain before the surgery. There was no improvement in strength after surgery. His strength continued to worsen. In August 2018 was admitted for 2 falls. He was worked up for cardiac and pulmonary causes of his overall weakness and fatigue. Sometime around that time, perhaps before, the left leg became weak. He also developed hand weakness.  By October 2018 weakness was now both proximal and distal in both lower limbs, but still the right leg was much more affected than the left. Still no pain, numbness or paresthesias. No back or neck pain. No tremor. No dysarthria, dysphagia, diplopia, or shortness of breath. He was hospitalized from 12/6 to 12/26 and had an extensive workup including EMG, lumbar puncture, serum studies and sural biopsy. Data is detailed below. He received IVIG 2 gm/kg (12/6 - 12/10) and then started 0.75 gm/kg q 2 weeks. IVIg was increased to 130 gm Q 2 weeks on 3/7/2019 after reporting worsening right leg weakness and wear of effect on day 10-12 after infusion.     Interim History:   He was last seen in clinic 2/13/2019. Since then, he reports no longer being able to use the rolling walker and essentially becoming dependent on the wheelchair. His wife reports that transferring has become more difficult. He has more energy while on IVIg but does not notice any particular improvement in strength. He previously reported a wear off effect at day 10-12 after an IVIg cycle where he'd  have lower energy and would stay in bed most of the day. He has been checking his  strength periodically over the last couple of weeks; strength is ususally upper 30's to low 40's. His IVIg dose was increased to 130 gm Q 2 weeks in 3/4/2019. His last infusion was 3/21; this was his 2nd infusion with the higher dose. Between these two infusions, he has had no wear off effect. Denies any development of paresthesias or numbness in the limbs except for some intermittent numbness on the bottom of the right foot.     Prior pertinent laboratory work-up:  12/18: Negative NF and CNTN1 abs. Normal VEGF. Serum IF showed no monoclonal protein.   12/7/18:  CSF RBC 4038. WBC 14. Protein 136. Glucose 74. No OGB, VDRL, lyme negative  11/29/18: CSF . WBC 8. Protein 186. Glucose 65. CSF cytology, lyme negative  11-12 /2018: TSH 7.42 (high). . Hba1c 6.7. MAGDA 1:80. Serum IF showed small IgG monoclonal protein. Normal/negative T4, ESR,CRP, lyme, lead, GM1, SSA, SSB, MMA, B12     Prior biopsy:   12/18: Sural nerve biopsy was essentially normal    Prior pertinent radiology work-up:  11/18: MRI brain w/w/o: essentially normal brain   11/18: MRI LS T/C spine w/w/o contrast. No areas of nerve root enlargement or enhancement. Multilevel spondylosis changes as detailed in radiology report.     Prior electrophysiologic work-up:  11/18: NSC/[facility]G at [facility] showed of a generalized disorder of lower motor neurons affecting the cervical, thoracic, and lumbosacral segments. In addition, there is evidence of a superimposed mild to moderate right-sided median neuropathy at the wrist and a superimposed mild to moderate right-sided ulnar neuropathy at the elbow. Note is additionally made of very modest distal slowing of several sensory responses.    Past Medical History:   HTN, glaucoma in both eyes, vitiligo  13 years ago thyroid ablation due to hyperthyroidism    Past Surgical History:  Right partial knee replacement    Family history:   "  There is no known family history of hereditary neuropathies or other neuromuscular disorders.    Social History:    She denies tobacco, alcohol, or illicit drug use.     Medical Allergies:  NKDA     Current Medications:      Current Outpatient Medications   Medication     acetaminophen (TYLENOL) 325 MG tablet     ALPHAGAN P 0.1 % ophthalmic solution     amLODIPine (NORVASC) 5 MG tablet     dorzolamide-timolol (COSOPT) 2-0.5 % ophthalmic solution     hydrochlorothiazide (HYDRODIURIL) 25 MG tablet     latanoprost (XALATAN) 0.005 % ophthalmic solution     levothyroxine (SYNTHROID/LEVOTHROID) 125 MCG tablet     lisinopril (PRINIVIL/ZESTRIL) 20 MG tablet     No current facility-administered medications for this visit.      Review of Systems: A complete review of systems was obtained and was negative except for what was noted above.    Physical examination:    BP (!) 146/94 (BP Location: Left arm, Patient Position: Sitting, Cuff Size: Adult Large)   Pulse 65   Temp 97.7  F (36.5  C) (Oral)   Resp 18   Ht 1.778 m (5' 10\")   Wt 136.5 kg (300 lb 14.4 oz)   SpO2 98%   BMI 43.17 kg/m      General Appearance: NAD    Skin: There are no rashes or other skin lesions.    Musculoskeletal:  There is no scoliosis, lordosis, kyphosis, pes cavus, or hammertoes.    Neurologic examination:    Mental status:  Patient is alert, attentive, and oriented x 3.  Language is coherent and fluent without dysarthria or aphasia.  Memory, comprehension and ability to follow commands were intact.       Cranial nerves:  Pupils were round and reacted to light.  Extraocular movements were full. There was no face, jaw, palate or tongue weakness or atrophy. Hearing was grossly intact.  Shoulder shrug was normal.      Motor exam: No atrophy or fasciculations.   Manual muscle testing revealed the following MRC grade muscle power:   Right Left   Neck flexion 5    Neck extension: 5    Shoulder abduction:  5 5   Elbow extension: 4 4+   Elbow flexion:  5 " 5   Wrist flexion:  5 5   Wrist extension:  5 5   Finger flexion 5 5   Finger extension 4 4    FDI 4 4+   APB 4+ 5   Hip flexion 1 4-   Hip extension 2 4+   Hip abduction 2 5   Hip adduction 2 4   Knee flexion 2 4   Knee extension 1-2 5   Dorsiflexion 0 2   Plantar flexion 0 5   Eversion 0 2   Inversion 0 3   EHL 0 3     Complex motor skills: No tremor or ataxia     Sensory exam: Decreased vibratory perception in the toes bilaterally, 3 seconds on the right, 5 seconds on the left. Proprioception was intact. Pinprick and temperature were decreased to the ankles bilaterally. Unable to test Romberg, patient profoundly weak and cannot stand with feet together    Gait: Wide based, unsteady, requires much assist      Deep tendon reflexes:   Right Left   Triceps 1 1   Biceps 1 1   Brachioradialis 2 1   Knee jerk 0 0   Ankle jerk 0 0   Plantar responses were mute bilaterally.       18 (before IVIg)-  Left hand: average 35 kg,  right hand: average 31 kg     Neuropathy Assessments  Neurology Assessments 3/27/2019 2019 2018   RODS CIDP/MGUSP Score 15 19 16      Strength: 3/27/2019 2019 2018   Right hand strength in K 40 35   Left hand strength in K 42 36     Immunotherapy 3/27/2019 2019 2018   Time since last IVIG (days): 6 days ago 1 week ago 2 weeks   Current treatment: IVIG 130 gm q 2 weeks IVIG 100 gm q 2 weeks IVIG 100 gm q 2 weeks       Assessment:    Ry Churchill is a 64 year old man with an asymmetric lower motor disorder with widespread active denervation and chronic reinnervation on EMG. 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 Although he  Initially reported some subjective improvements, these improvements now are prinipally improved fatigue (while his strength has continued  to deteriorate). We noted an initial improvement in  strength, but this has not been sustained either. His MRC score has slowly worsened over the last few months. After noticing more difficulties ambulating with a rolling walker, we increased his IVIg dose to 1 gm/kg Q 2 weeks in 3/2019. Despite receiving 2 cycles of this higher dose, there has been a decline in his BOY-S score,  strength and selected muscles in the upper limbs. 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 After a discussion with the patient and his family, we agreed to stop IVIg to determine whether treatment was beneficial and check a nerve ultrasound for further diagnostic clarity. He has an appointment at [facility] in April for a second opinion. He will follow up in our clinic after 6 weeks.    Plan  - Nerve ultrasound   - Stop IVIg   - Follow up in clinic on May, 15, 2019    Thi López DO   [facility] Neuromuscular Fellow [id number]    Patient seen and evaluated with Dr. Collins. His addendum follows.   ----  ADDENDUM:  I personally interviewed and examined the patient. I agree with the history, physical, assessment, and plan as documented above.  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 Diagnostic curiosities included onset after febrile illness, very high CSF protein (perhaps due to trauma during the LP and LS spondylitic changes), absence of any UMN or bulbar findings, and initial possible improvement after IVIG. At this point, however, there seems not to be any convincing evidence of benefit with IVIG. MRC strength and I-RODS have instead been trending in the opposite direction. We will stop IVIg at this time. I discussed the implications of this with him and his family, namely that IVIg non-response adds another piece of data that favors ALS. We will arrange ultrasound of his  limbs. There is some data to suggest that peripheral nerve ultrasound characteristics may be helpful in differentiating ALS from MMN - although at this point the diagnosis seems to be declaring. He will see Dr. Lazo at the [facility] next month. I will value Dr. Lazo's opinion. I will see him back in May. Pending those evaluations and his clinical course after IVIG suspension will determine if he would be best served in the ALS clinic.     Benjamin Collins MD    ADDENDUM:  4/29/19: I spoke with him by phone. He was seen at [facility] (Dr. Lazo). I do not have all the [facility] records, but he was told that the conclusion was ALS. I offered him an appointment in our ALS clinic. They are in the process of moving from [place] to [place], and would like to wait until that transition before setting up an appointment. I encouraged the sooner the better. He will get in touch with me when he is ready (est 4-6 weeks).He knows that I am available for him if any questions come up.

## 2019-03-27 NOTE — LETTER
3/27/2019       RE: Ry Churchill  [address]  [place] [address]     Dear Colleague,    Thank you for referring your patient, Ry Churchill, to the [facility] NEUROLOGY at [facility]. Please see a copy of my visit note below.    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 Weakness began October 2017 about 1-2 months following a febrile illness. Weakness onset was in his right foot. There was no pain or paresthesias. In 1/2018 he started using a cane. In 3/18 he underwent L1-2 laminectomy and decompression with L4 laminotomy right L5 laminectomy and medial facetectomy. He had no back pain before the surgery. There was no improvement in strength after surgery. His strength continued to worsen. In August 2018 was admitted for 2 falls. He was worked up for cardiac and pulmonary causes of his overall weakness and fatigue. Sometime around that time, perhaps before, the left leg became weak. He also developed hand weakness.  By October 2018 weakness was now both proximal and distal in both lower limbs, but still the right leg was much more affected than the left. Still no pain, numbness or paresthesias. No back or neck pain. No tremor. No dysarthria, dysphagia, diplopia, or shortness of breath. He was hospitalized from 12/6 to 12/26 and had an extensive workup including EMG, lumbar puncture, serum studies and sural biopsy. Data is detailed below. He received IVIG 2 gm/kg (12/6 - 12/10) and then started 0.75 gm/kg q 2 weeks. IVIg was increased to 130 gm Q 2 weeks on 3/7/2019 after reporting worsening right leg weakness and wear of effect on day 10-12 after infusion.     Interim History:   He was last seen in clinic 2/13/2019. Since then, he reports no longer being able to use the rolling walker and  essentially becoming dependent on the wheelchair. His wife reports that transferring has become more difficult. He has more energy while on IVIg but does not notice any particular improvement in strength. He previously reported a wear off effect at day 10-12 after an IVIg cycle where he'd have lower energy and would stay in bed most of the day. He has been checking his  strength periodically over the last couple of weeks; strength is ususally upper 30's to low 40's. His IVIg dose was increased to 130 gm Q 2 weeks in 3/4/2019. His last infusion was 3/21; this was his 2nd infusion with the higher dose. Between these two infusions, he has had no wear off effect. Denies any development of paresthesias or numbness in the limbs except for some intermittent numbness on the bottom of the right foot.     Prior pertinent laboratory work-up:  12/18: Negative NF and CNTN1 abs. Normal VEGF. Serum IF showed no monoclonal protein.   12/7/18:  CSF RBC 4038. WBC 14. Protein 136. Glucose 74. No OGB, VDRL, lyme negative  11/29/18: CSF . WBC 8. Protein 186. Glucose 65. CSF cytology, lyme negative  11-12 /2018: TSH 7.42 (high). . Hba1c 6.7. MAGDA 1:80. Serum IF showed small IgG monoclonal protein. Normal/negative T4, ESR,CRP, lyme, lead, GM1, SSA, SSB, MMA, B12     Prior biopsy:   12/18: Sural nerve biopsy was essentially normal    Prior pertinent radiology work-up:  11/18: MRI brain w/w/o: essentially normal brain   11/18: MRI LS T/C spine w/w/o contrast. No areas of nerve root enlargement or enhancement. Multilevel spondylosis changes as detailed in radiology report.     Prior electrophysiologic work-up:  11/18: NSC/EMG at [facility] showed of a generalized disorder of lower motor neurons affecting the cervical, thoracic, and lumbosacral segments. In addition, there is evidence of a superimposed mild to moderate right-sided median neuropathy at the wrist and a superimposed mild to moderate right-sided ulnar neuropathy at the  "elbow. Note is additionally made of very modest distal slowing of several sensory responses.    Past Medical History:   HTN, glaucoma in both eyes, vitiligo  13 years ago thyroid ablation due to hyperthyroidism    Past Surgical History:  Right partial knee replacement    Family history:    There is no known family history of hereditary neuropathies or other neuromuscular disorders.    Social History:    She denies tobacco, alcohol, or illicit drug use.     Medical Allergies:  NKDA     Current Medications:      Current Outpatient Medications   Medication     acetaminophen (TYLENOL) 325 MG tablet     ALPHAGAN P 0.1 % ophthalmic solution     amLODIPine (NORVASC) 5 MG tablet     dorzolamide-timolol (COSOPT) 2-0.5 % ophthalmic solution     hydrochlorothiazide (HYDRODIURIL) 25 MG tablet     latanoprost (XALATAN) 0.005 % ophthalmic solution     levothyroxine (SYNTHROID/LEVOTHROID) 125 MCG tablet     lisinopril (PRINIVIL/ZESTRIL) 20 MG tablet     No current facility-administered medications for this visit.      Review of Systems: A complete review of systems was obtained and was negative except for what was noted above.    Physical examination:    BP (!) 146/94 (BP Location: Left arm, Patient Position: Sitting, Cuff Size: Adult Large)   Pulse 65   Temp 97.7  F (36.5  C) (Oral)   Resp 18   Ht 1.778 m (5' 10\")   Wt 136.5 kg (300 lb 14.4 oz)   SpO2 98%   BMI 43.17 kg/m       General Appearance: NAD    Skin: There are no rashes or other skin lesions.    Musculoskeletal:  There is no scoliosis, lordosis, kyphosis, pes cavus, or hammertoes.    Neurologic examination:    Mental status:  Patient is alert, attentive, and oriented x 3.  Language is coherent and fluent without dysarthria or aphasia.  Memory, comprehension and ability to follow commands were intact.       Cranial nerves:  Pupils were round and reacted to light.  Extraocular movements were full. There was no face, jaw, palate or tongue weakness or atrophy. " Hearing was grossly intact.  Shoulder shrug was normal.      Motor exam: No atrophy or fasciculations.   Manual muscle testing revealed the following MRC grade muscle power:   Right Left   Neck flexion 5    Neck extension: 5    Shoulder abduction:  5 5   Elbow extension: 4 4+   Elbow flexion:  5 5   Wrist flexion:  5 5   Wrist extension:  5 5   Finger flexion 5 5   Finger extension 4 4    FDI 4 4+   APB 4+ 5   Hip flexion 1 4-   Hip extension 2 4+   Hip abduction 2 5   Hip adduction 2 4   Knee flexion 2 4   Knee extension 1-2 5   Dorsiflexion 0 2   Plantar flexion 0 5   Eversion 0 2   Inversion 0 3   EHL 0 3     Complex motor skills: No tremor or ataxia     Sensory exam: Decreased vibratory perception in the toes bilaterally, 3 seconds on the right, 5 seconds on the left. Proprioception was intact. Pinprick and temperature were decreased to the ankles bilaterally. Unable to test Romberg, patient profoundly weak and cannot stand with feet together    Gait: Wide based, unsteady, requires much assist      Deep tendon reflexes:   Right Left   Triceps 1 1   Biceps 1 1   Brachioradialis 2 1   Knee jerk 0 0   Ankle jerk 0 0   Plantar responses were mute bilaterally.       18 (before IVIg)-  Left hand: average 35 kg,  right hand: average 31 kg     Neuropathy Assessments  Neurology Assessments 3/27/2019 2019 2018   RODS CIDP/MGUSP Score 15 19 16      Strength: 3/27/2019 2019 2018   Right hand strength in K 40 35   Left hand strength in K 42 36     Immunotherapy 3/27/2019 2019 2018   Time since last IVIG (days): 6 days ago 1 week ago 2 weeks   Current treatment: IVIG 130 gm q 2 weeks IVIG 100 gm q 2 weeks IVIG 100 gm q 2 weeks       Assessment:    Ry Churchill is a 64 year old man with an asymmetric lower motor disorder with widespread active denervation and chronic reinnervation on EMG. 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 Although he  Initially reported some subjective improvements, these improvements now are prinipally improved fatigue (while his strength has continued to deteriorate). We noted an initial improvement in  strength, but this has not been sustained either. His MRC score has slowly worsened over the last few months. After noticing more difficulties ambulating with a rolling walker, we increased his IVIg dose to 1 gm/kg Q 2 weeks in 3/2019. Despite receiving 2 cycles of this higher dose, there has been a decline in his BOY-S score,  strength and selected muscles in the upper limbs. 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 After a discussion with the patient and his family, we agreed to stop IVIg to determine whether treatment was beneficial and check a nerve ultrasound for further diagnostic clarity. He has an appointment at [facility] in April for a second opinion. He will follow up in our clinic after 6 weeks.    Plan  - Nerve ultrasound   - Stop IVIg   - Follow up in clinic on May, 15, 2019    Thi López DO   [facility] Neuromuscular Fellow [id number]    Patient seen and evaluated with Dr. Collins. His addendum follows.   ----  ADDENDUM:  I personally interviewed and examined the patient. I agree with the history, physical, assessment, and plan as documented above.  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 Diagnostic curiosities included onset after febrile illness, very high CSF protein (perhaps due to trauma during the LP and LS spondylitic changes), absence of any UMN or bulbar findings, and initial possible improvement after IVIG. At this point, however, there seems not to be any convincing evidence of  benefit with IVIG. MRC strength and I-RODS have instead been trending in the opposite direction. We will stop IVIg at this time. I discussed the implications of this with him and his family, namely that IVIg non-response adds another piece of data that favors ALS. We will arrange ultrasound of his limbs. There is some data to suggest that peripheral nerve ultrasound characteristics may be helpful in differentiating ALS from MMN - although at this point the diagnosis seems to be declaring. He will see Dr. Lazo at the [facility] next month. I will value Dr. Lazo's opinion. I will see him back in May. Pending those evaluations and his clinical course after IVIG suspension will determine if he would be best served in the ALS clinic.   Again, thank you for allowing me to participate in the care of your patient.      Sincerely,    Benjamin Collins MD

## 2019-03-28 NOTE — TELEPHONE ENCOUNTER
Patient was seen by Dr. Collins yesterday and the decision was made to stop his IVIG. Called [facility] (phone: [phone]; fax: [phone]) and spoke with Mariely and informed her of this.

## 2019-04-23 NOTE — LETTER
4/23/2019       RE: Ry Churchill  [address]  [facility] [address]     Dear Colleague,    Thank you for referring your patient, Ry Churchill, to the [facility] EMG at [facility]. Please see a copy of my visit note below.    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 Ultrasonographic study requested to assist with differential diagnosis.   Techniques:   Ultrasound of the bilateral median, ulnar, tibial, sciatic, radial nerves, C7 roots and superior trunks of the brachial plexus was performed with a 15 Mhz transducer according to the protocol described by Carole RICHARDSON et al (J Neurol. 2016 Jan;263(1):35-44.). In summary, cross sectional area of the aforementioned nerves was measured by obtaining transverse ultrasound images at the following locations: Median and ulnar nerve:   of the distance between the medial epicondyle of the humerus to the ulnar styloid process with the elbow orthogonally flexed; median, radial and ulnar nerve: two-thirds of the lateral tip of the acromion to the lateral epicondyle of the humerus; tibial nerve: three-fourths of the distance from the head of the fibula to the lateral malleolus; sciatic nerve: two-thirds from the greater trochanter to the lateral epicondyle of the femur; cervical nerve roots 5-7: interscalenic gap; superior trunk: supraclavicular fossa. Normal values were obtained from the same reference.   Results:   Abnormal values are highlighted in bold.    Nerve measured Cutoff value (mm2) LEFT RIGHT   Ulnar-lower arm 6.05 6 5   Median-lower arm 7.53 10 7   Median- upper arm 9.83 9 17   Radial-upper arm 5.63 4 7   Tibial-lower leg 10.33 9 15   Sciatic-upper leg 55.77 62 40   Superior trunk  23.33 17 21   C7 root 12.47 ND ND     Images were stored and are available for review. Note that the C7 roots could not be well visualized at the interscalenic gap due to technical difficulties (short/thick neck, difficulty obtaining adequate views).  Interpretation:   This is an abnormal ultrasonographic study showing increased CSA (focal enlargement) in four out of 14 nerves studied, none of which was found at a common entrapment site. 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 However, because this model and technique are not yet widely accepted, cautious interpretation of the results in correlation to clinical, EMG, and laboratory data is recommended.   Ultrasonographer:   Teodoro Robison MD    Again, thank you for allowing me to participate in the care of your patient.      Sincerely,    Teodoro Robison MD

## 2019-04-23 NOTE — PROGRESS NOTES
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 Ultrasonographic study requested to assist with differential diagnosis.     Techniques:   Ultrasound of the bilateral median, ulnar, tibial, sciatic, radial nerves, C7 roots and superior trunks of the brachial plexus was performed with a 15 Mhz transducer according to the protocol described by Carole RICHARDSON et al (J Neurol. 2016 Jan;263(1):35-44.). In summary, cross sectional area of the aforementioned nerves was measured by obtaining transverse ultrasound images at the following locations: Median and ulnar nerve:   of the distance between the medial epicondyle of the humerus to the ulnar styloid process with the elbow orthogonally flexed; median, radial and ulnar nerve: two-thirds of the lateral tip of the acromion to the lateral epicondyle of the humerus; tibial nerve: three-fourths of the distance from the head of the fibula to the lateral malleolus; sciatic nerve: two-thirds from the greater trochanter to the lateral epicondyle of the femur; cervical nerve roots 5-7: interscalenic gap; superior trunk: supraclavicular fossa. Normal values were obtained from the same reference.     Results:   Abnormal values are highlighted in bold.    Nerve measured Cutoff value (mm2) LEFT RIGHT   Ulnar-lower arm 6.05 6 5   Median-lower arm 7.53 10 7   Median- upper arm 9.83 9 17   Radial-upper arm 5.63 4 7   Tibial-lower leg 10.33 9 15   Sciatic-upper leg 55.77 62 40   Superior trunk 23.33 17 21   C7 root 12.47 ND ND     Images were stored and are available for review. Note that the C7 roots could not be well visualized at the interscalenic gap due to technical difficulties (short/thick neck, difficulty obtaining adequate views).  Interpretation:   This  is an abnormal ultrasonographic study showing increased CSA (focal enlargement) in four out of 14 nerves studied, none of which was found at a common entrapment site. 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 However, because this model and technique are not yet widely accepted, cautious interpretation of the results in correlation to clinical, EMG, and laboratory data is recommended.   Ultrasonographer:   Teodoro Robison MD

## (undated) DEVICE — DRSG TELFA 3X8" 1238

## (undated) DEVICE — LINEN TOWEL PACK X5 5464

## (undated) DEVICE — SU PROLENE 5-0 RB-1DA 36"  8556H

## (undated) DEVICE — PREP CHLORAPREP 26ML TINTED ORANGE  260815

## (undated) DEVICE — DRAPE U SPLIT 74X120" 29440

## (undated) DEVICE — LINEN TOWEL PACK X6 WHITE 5487

## (undated) DEVICE — Device

## (undated) DEVICE — SUCTION TIP YANKAUER STR K87

## (undated) DEVICE — BLADE KNIFE SURG 11 371111

## (undated) DEVICE — VESSEL LOOPS YELLOW MAXI 31145694

## (undated) DEVICE — SU VICRYL 4-0 PS-2 18" UND J496H

## (undated) DEVICE — GLOVE PROTEXIS POWDER FREE SMT 7.5  2D72PT75X

## (undated) DEVICE — GLOVE PROTEXIS BLUE W/NEU-THERA 7.5  2D73EB75

## (undated) DEVICE — DRSG PRIMAPORE 03 1/8X6" 66000318

## (undated) DEVICE — TUBING SUCTION 10'X3/16" N510

## (undated) DEVICE — SOL WATER IRRIG 1000ML BOTTLE 2F7114

## (undated) DEVICE — SU VICRYL 3-0 SH 27" UND J416H

## (undated) DEVICE — LINEN GOWN XLG 5407

## (undated) DEVICE — SU SILK 3-0 TIE 12X30" A304H

## (undated) DEVICE — DRAPE SHEET MED 44X70" 9355

## (undated) DEVICE — DECANTER VIAL 2006S

## (undated) RX ORDER — LIDOCAINE HYDROCHLORIDE 20 MG/ML
INJECTION, SOLUTION EPIDURAL; INFILTRATION; INTRACAUDAL; PERINEURAL
Status: DISPENSED
Start: 2018-01-01

## (undated) RX ORDER — PROPOFOL 10 MG/ML
INJECTION, EMULSION INTRAVENOUS
Status: DISPENSED
Start: 2018-01-01

## (undated) RX ORDER — EPHEDRINE SULFATE 50 MG/ML
INJECTION, SOLUTION INTRAMUSCULAR; INTRAVENOUS; SUBCUTANEOUS
Status: DISPENSED
Start: 2018-01-01

## (undated) RX ORDER — BUPIVACAINE HYDROCHLORIDE 2.5 MG/ML
INJECTION, SOLUTION EPIDURAL; INFILTRATION; INTRACAUDAL
Status: DISPENSED
Start: 2018-01-01

## (undated) RX ORDER — LIDOCAINE HYDROCHLORIDE 10 MG/ML
INJECTION, SOLUTION EPIDURAL; INFILTRATION; INTRACAUDAL; PERINEURAL
Status: DISPENSED
Start: 2018-01-01

## (undated) RX ORDER — LIDOCAINE HYDROCHLORIDE AND EPINEPHRINE 10; 10 MG/ML; UG/ML
INJECTION, SOLUTION INFILTRATION; PERINEURAL
Status: DISPENSED
Start: 2018-01-01

## (undated) RX ORDER — PHENYLEPHRINE HCL IN 0.9% NACL 1 MG/10 ML
SYRINGE (ML) INTRAVENOUS
Status: DISPENSED
Start: 2018-01-01

## (undated) RX ORDER — FENTANYL CITRATE 50 UG/ML
INJECTION, SOLUTION INTRAMUSCULAR; INTRAVENOUS
Status: DISPENSED
Start: 2018-01-01